# Patient Record
Sex: FEMALE | Race: WHITE | NOT HISPANIC OR LATINO | Employment: UNEMPLOYED | URBAN - METROPOLITAN AREA
[De-identification: names, ages, dates, MRNs, and addresses within clinical notes are randomized per-mention and may not be internally consistent; named-entity substitution may affect disease eponyms.]

---

## 2017-02-03 ENCOUNTER — OFFICE VISIT (OUTPATIENT)
Dept: INTERNAL MEDICINE | Facility: CLINIC | Age: 52
End: 2017-02-03

## 2017-02-03 VITALS
BODY MASS INDEX: 42.43 KG/M2 | HEART RATE: 62 BPM | RESPIRATION RATE: 16 BRPM | DIASTOLIC BLOOD PRESSURE: 86 MMHG | SYSTOLIC BLOOD PRESSURE: 140 MMHG | WEIGHT: 232 LBS

## 2017-02-03 DIAGNOSIS — E78.5 HYPERLIPIDEMIA, UNSPECIFIED HYPERLIPIDEMIA TYPE: ICD-10-CM

## 2017-02-03 DIAGNOSIS — K59.09 OTHER CONSTIPATION: ICD-10-CM

## 2017-02-03 DIAGNOSIS — F41.9 ANXIETY: ICD-10-CM

## 2017-02-03 DIAGNOSIS — J44.9 CHRONIC OBSTRUCTIVE PULMONARY DISEASE, UNSPECIFIED COPD TYPE (HCC): ICD-10-CM

## 2017-02-03 DIAGNOSIS — F32.A DEPRESSION, UNSPECIFIED DEPRESSION TYPE: ICD-10-CM

## 2017-02-03 DIAGNOSIS — I10 ESSENTIAL HYPERTENSION: Primary | ICD-10-CM

## 2017-02-03 DIAGNOSIS — M54.50 CHRONIC LOW BACK PAIN WITHOUT SCIATICA, UNSPECIFIED BACK PAIN LATERALITY: ICD-10-CM

## 2017-02-03 DIAGNOSIS — G47.09 OTHER INSOMNIA: ICD-10-CM

## 2017-02-03 DIAGNOSIS — K21.9 GASTROESOPHAGEAL REFLUX DISEASE WITHOUT ESOPHAGITIS: ICD-10-CM

## 2017-02-03 DIAGNOSIS — G89.29 CHRONIC LOW BACK PAIN WITHOUT SCIATICA, UNSPECIFIED BACK PAIN LATERALITY: ICD-10-CM

## 2017-02-03 DIAGNOSIS — Z11.59 NEED FOR HEPATITIS C SCREENING TEST: ICD-10-CM

## 2017-02-03 DIAGNOSIS — J30.89 SEASONAL ALLERGIC RHINITIS DUE TO OTHER ALLERGIC TRIGGER: ICD-10-CM

## 2017-02-03 PROBLEM — K59.00 CONSTIPATION: Status: ACTIVE | Noted: 2017-02-03

## 2017-02-03 PROBLEM — J30.2 SEASONAL ALLERGIES: Status: ACTIVE | Noted: 2017-02-03

## 2017-02-03 LAB
ALBUMIN SERPL-MCNC: 4.6 G/DL (ref 3.2–4.8)
ALBUMIN/GLOB SERPL: 1.5 G/DL (ref 1.5–2.5)
ALP SERPL-CCNC: 107 U/L (ref 25–100)
ALT SERPL W P-5'-P-CCNC: 43 U/L (ref 7–40)
ANION GAP SERPL CALCULATED.3IONS-SCNC: 7 MMOL/L (ref 3–11)
AST SERPL-CCNC: 27 U/L (ref 0–33)
BILIRUB SERPL-MCNC: 0.3 MG/DL (ref 0.3–1.2)
BUN BLD-MCNC: 12 MG/DL (ref 9–23)
BUN/CREAT SERPL: 17.1 (ref 7–25)
CALCIUM SPEC-SCNC: 10.6 MG/DL (ref 8.7–10.4)
CHLORIDE SERPL-SCNC: 102 MMOL/L (ref 99–109)
CO2 SERPL-SCNC: 30 MMOL/L (ref 20–31)
CREAT BLD-MCNC: 0.7 MG/DL (ref 0.6–1.3)
DEPRECATED RDW RBC AUTO: 42.6 FL (ref 37–54)
ERYTHROCYTE [DISTWIDTH] IN BLOOD BY AUTOMATED COUNT: 13.4 % (ref 11.3–14.5)
GFR SERPL CREATININE-BSD FRML MDRD: 88 ML/MIN/1.73
GLOBULIN UR ELPH-MCNC: 3 GM/DL
GLUCOSE BLD-MCNC: 96 MG/DL (ref 70–100)
HAV IGM SERPL QL IA: NORMAL
HBV CORE IGM SERPL QL IA: NORMAL
HBV SURFACE AG SERPL QL IA: NORMAL
HCT VFR BLD AUTO: 43.2 % (ref 34.5–44)
HCV AB SER DONR QL: NORMAL
HGB BLD-MCNC: 14 G/DL (ref 11.5–15.5)
MCH RBC QN AUTO: 28.2 PG (ref 27–31)
MCHC RBC AUTO-ENTMCNC: 32.4 G/DL (ref 32–36)
MCV RBC AUTO: 86.9 FL (ref 80–99)
PLATELET # BLD AUTO: 176 10*3/MM3 (ref 150–450)
PMV BLD AUTO: 13.8 FL (ref 6–12)
POTASSIUM BLD-SCNC: 4.6 MMOL/L (ref 3.5–5.5)
PROT SERPL-MCNC: 7.6 G/DL (ref 5.7–8.2)
RBC # BLD AUTO: 4.97 10*6/MM3 (ref 3.89–5.14)
SODIUM BLD-SCNC: 139 MMOL/L (ref 132–146)
WBC NRBC COR # BLD: 7.35 10*3/MM3 (ref 3.5–10.8)

## 2017-02-03 PROCEDURE — 80074 ACUTE HEPATITIS PANEL: CPT | Performed by: INTERNAL MEDICINE

## 2017-02-03 PROCEDURE — 99214 OFFICE O/P EST MOD 30 MIN: CPT | Performed by: INTERNAL MEDICINE

## 2017-02-03 PROCEDURE — 80053 COMPREHEN METABOLIC PANEL: CPT | Performed by: INTERNAL MEDICINE

## 2017-02-03 PROCEDURE — 36415 COLL VENOUS BLD VENIPUNCTURE: CPT | Performed by: INTERNAL MEDICINE

## 2017-02-03 PROCEDURE — 85027 COMPLETE CBC AUTOMATED: CPT | Performed by: INTERNAL MEDICINE

## 2017-02-03 RX ORDER — CETIRIZINE HYDROCHLORIDE 10 MG/1
10 TABLET ORAL DAILY
Qty: 30 TABLET | Refills: 5 | Status: SHIPPED | OUTPATIENT
Start: 2017-02-03 | End: 2017-08-15 | Stop reason: SDUPTHER

## 2017-02-03 RX ORDER — DICYCLOMINE HCL 20 MG
20 TABLET ORAL 2 TIMES DAILY PRN
Qty: 60 TABLET | Refills: 2 | Status: SHIPPED | OUTPATIENT
Start: 2017-02-03 | End: 2017-07-08 | Stop reason: SDUPTHER

## 2017-02-03 RX ORDER — BUDESONIDE AND FORMOTEROL FUMARATE DIHYDRATE 160; 4.5 UG/1; UG/1
2 AEROSOL RESPIRATORY (INHALATION)
Qty: 10.2 INHALER | Refills: 5 | Status: SHIPPED | OUTPATIENT
Start: 2017-02-03 | End: 2018-02-26

## 2017-02-03 RX ORDER — FLUTICASONE PROPIONATE 50 MCG
1 SPRAY, SUSPENSION (ML) NASAL DAILY
Qty: 1 EACH | Refills: 5 | Status: SHIPPED | OUTPATIENT
Start: 2017-02-03 | End: 2018-02-26 | Stop reason: SDUPTHER

## 2017-02-03 RX ORDER — GABAPENTIN 400 MG/1
400 CAPSULE ORAL 3 TIMES DAILY
Qty: 90 CAPSULE | Refills: 2 | Status: SHIPPED | OUTPATIENT
Start: 2017-02-03 | End: 2017-07-17 | Stop reason: SDUPTHER

## 2017-02-03 RX ORDER — LISINOPRIL 10 MG/1
10 TABLET ORAL DAILY
Qty: 30 TABLET | Refills: 5 | Status: CANCELLED | OUTPATIENT
Start: 2017-02-03

## 2017-02-03 RX ORDER — SIMVASTATIN 20 MG
20 TABLET ORAL NIGHTLY
Qty: 30 TABLET | Refills: 5 | Status: SHIPPED | OUTPATIENT
Start: 2017-02-03 | End: 2018-02-26 | Stop reason: SDUPTHER

## 2017-02-03 RX ORDER — LISINOPRIL 20 MG/1
20 TABLET ORAL DAILY
Qty: 30 TABLET | Refills: 4 | Status: SHIPPED | OUTPATIENT
Start: 2017-02-03 | End: 2017-07-08 | Stop reason: SDUPTHER

## 2017-02-03 RX ORDER — MELOXICAM 7.5 MG/1
7.5 TABLET ORAL DAILY PRN
Qty: 30 TABLET | Refills: 5 | Status: SHIPPED | OUTPATIENT
Start: 2017-02-03 | End: 2017-08-12 | Stop reason: SDUPTHER

## 2017-02-03 RX ORDER — LANSOPRAZOLE 15 MG/1
15 CAPSULE, DELAYED RELEASE ORAL 2 TIMES DAILY
Qty: 60 CAPSULE | Refills: 5 | Status: SHIPPED | OUTPATIENT
Start: 2017-02-03 | End: 2018-02-26 | Stop reason: SDUPTHER

## 2017-02-03 NOTE — PROGRESS NOTES
Gaby Lowe is a 51 y.o. female.   Pt is here to follow up on GERD,HPL,HTN,COPD,anxiety/depression,constipation and insomnia.                 History of Present Illness   Pt is here to follow up on GERD,HPL,HTN,COPD,anxiety/depression,constipation and insomnia. She states that all chronic issues are doing well but she is interested in a healthier diet and weight loss.           The following portions of the patient's history were reviewed and updated as appropriate: allergies, current medications, past family history, past medical history, past social history, past surgical history and problem list.             Review of Systems   Constitutional: Negative.    HENT: Negative.    Eyes: Negative.    Respiratory:        See HPI   Cardiovascular:        See HPI.    Gastrointestinal:        See HPI..    Endocrine: Negative.    Genitourinary: Negative.    Musculoskeletal: Negative.    Skin: Negative.    Allergic/Immunologic: Negative.    Neurological: Negative.    Hematological: Negative.    Psychiatric/Behavioral:        See HPI.                 Objective   Physical Exam   Constitutional: She is oriented to person, place, and time. She appears well-developed and well-nourished.   HENT:   Head: Normocephalic and atraumatic.   Right Ear: External ear normal.   Left Ear: External ear normal.   Nose: Nose normal.   Mouth/Throat: Oropharynx is clear and moist.   Eyes: Conjunctivae and EOM are normal. Pupils are equal, round, and reactive to light.   Neck: Normal range of motion. Neck supple. No tracheal deviation present. No thyromegaly present.   Cardiovascular: Normal rate, regular rhythm, normal heart sounds and intact distal pulses.    Pulmonary/Chest: Effort normal and breath sounds normal.   Abdominal: Soft. Bowel sounds are normal. She exhibits no distension. There is no tenderness.   Neurological: She is alert and oriented to person, place, and time. She has normal reflexes.   Skin: Skin is warm  and dry.   Psychiatric: She has a normal mood and affect.   Nursing note and vitals reviewed.                 Assessment/Plan     Essential hypertension  -     Comprehensive Metabolic Panel  -     CBC (No Diff)  -     lisinopril (PRINIVIL,ZESTRIL) 20 MG tablet; Take 1 tablet by mouth Daily.    Gastroesophageal reflux disease without esophagitis  -     lansoprazole (PREVACID) 15 MG capsule; Take 1 capsule by mouth 2 (Two) Times a Day.    Hyperlipidemia, unspecified hyperlipidemia type  -     simvastatin (ZOCOR) 20 MG tablet; Take 1 tablet by mouth Every Night.    Other constipation  -     dicyclomine (BENTYL) 20 MG tablet; Take 1 tablet by mouth 2 (Two) Times a Day As Needed (constipation).    Chronic obstructive pulmonary disease, unspecified COPD type  -     tiotropium (SPIRIVA HANDIHALER) 18 MCG per inhalation capsule; Place 1 capsule into inhaler and inhale Daily.  -     budesonide-formoterol (SYMBICORT) 160-4.5 MCG/ACT inhaler; Inhale 2 puffs 2 (Two) Times a Day.    Anxiety    Depression, unspecified depression type    Other insomnia    Seasonal allergic rhinitis due to other allergic trigger  -     cetirizine (zyrTEC) 10 MG tablet; Take 1 tablet by mouth Daily.  -     fluticasone (FLONASE) 50 MCG/ACT nasal spray; 1 spray into each nostril Daily.    Chronic low back pain without sciatica, unspecified back pain laterality  -     gabapentin (NEURONTIN) 400 MG capsule; Take 1 capsule by mouth 3 (Three) Times a Day.  -     meloxicam (MOBIC) 7.5 MG tablet; Take 1 tablet by mouth Daily As Needed for moderate pain (4-6).    Need for hepatitis C screening test  -     Hepatitis Panel, Acute    Other orders  -     Cancel: lisinopril (PRINIVIL,ZESTRIL) 10 MG tablet; Take 1 tablet by mouth Daily.        1.) Increase lisinopril to 20 mg PO Daily. How to dose and possible s/e discussed.   2.) Discussed Mediterranean diet for 10 minutes and gave example   3.) Follow up in 5 months   6.) Check CBC and CMP, Hepatitis screen    7.) Tetanus- Pt will get at pharmacy.   8.) Pt will  schedule physical to do pap and mammogram.     * Total time spent in discussion and exam 35 minutes.

## 2017-02-07 PROBLEM — Z11.59 NEED FOR HEPATITIS C SCREENING TEST: Status: ACTIVE | Noted: 2017-02-07

## 2017-03-19 DIAGNOSIS — F41.9 ANXIETY: ICD-10-CM

## 2017-03-19 DIAGNOSIS — F32.A DEPRESSION: ICD-10-CM

## 2017-05-22 RX ORDER — MOMETASONE FUROATE AND FORMOTEROL FUMARATE DIHYDRATE 200; 5 UG/1; UG/1
AEROSOL RESPIRATORY (INHALATION)
Qty: 13 G | Refills: 0 | Status: SHIPPED | OUTPATIENT
Start: 2017-05-22 | End: 2017-07-08 | Stop reason: SDUPTHER

## 2017-07-06 ENCOUNTER — OFFICE VISIT (OUTPATIENT)
Dept: INTERNAL MEDICINE | Facility: CLINIC | Age: 52
End: 2017-07-06

## 2017-07-06 VITALS
WEIGHT: 236 LBS | RESPIRATION RATE: 16 BRPM | DIASTOLIC BLOOD PRESSURE: 80 MMHG | SYSTOLIC BLOOD PRESSURE: 132 MMHG | BODY MASS INDEX: 43.43 KG/M2 | TEMPERATURE: 97 F | HEIGHT: 62 IN

## 2017-07-06 DIAGNOSIS — Z13.820 OSTEOPOROSIS SCREENING: ICD-10-CM

## 2017-07-06 DIAGNOSIS — Z12.39 BREAST CANCER SCREENING: Primary | ICD-10-CM

## 2017-07-06 DIAGNOSIS — Z12.4 CERVICAL CANCER SCREENING: ICD-10-CM

## 2017-07-06 DIAGNOSIS — Z13.6 SCREENING FOR CARDIOVASCULAR CONDITION: ICD-10-CM

## 2017-07-06 LAB
ALBUMIN SERPL-MCNC: 4.6 G/DL (ref 3.2–4.8)
ALBUMIN/GLOB SERPL: 1.6 G/DL (ref 1.5–2.5)
ALP SERPL-CCNC: 99 U/L (ref 25–100)
ALT SERPL W P-5'-P-CCNC: 35 U/L (ref 7–40)
ANION GAP SERPL CALCULATED.3IONS-SCNC: 12 MMOL/L (ref 3–11)
ARTICHOKE IGE QN: 120 MG/DL (ref 0–130)
AST SERPL-CCNC: 28 U/L (ref 0–33)
BILIRUB SERPL-MCNC: 0.4 MG/DL (ref 0.3–1.2)
BUN BLD-MCNC: 16 MG/DL (ref 9–23)
BUN/CREAT SERPL: 26.7 (ref 7–25)
CALCIUM SPEC-SCNC: 9.9 MG/DL (ref 8.7–10.4)
CHLORIDE SERPL-SCNC: 98 MMOL/L (ref 99–109)
CHOLEST SERPL-MCNC: 191 MG/DL (ref 0–200)
CO2 SERPL-SCNC: 26 MMOL/L (ref 20–31)
CREAT BLD-MCNC: 0.6 MG/DL (ref 0.6–1.3)
DEPRECATED RDW RBC AUTO: 44.3 FL (ref 37–54)
ERYTHROCYTE [DISTWIDTH] IN BLOOD BY AUTOMATED COUNT: 13.9 % (ref 11.3–14.5)
GFR SERPL CREATININE-BSD FRML MDRD: 105 ML/MIN/1.73
GLOBULIN UR ELPH-MCNC: 2.8 GM/DL
GLUCOSE BLD-MCNC: 113 MG/DL (ref 70–100)
HCT VFR BLD AUTO: 43.9 % (ref 34.5–44)
HDLC SERPL-MCNC: 51 MG/DL (ref 40–60)
HGB BLD-MCNC: 13.7 G/DL (ref 11.5–15.5)
MCH RBC QN AUTO: 27.2 PG (ref 27–31)
MCHC RBC AUTO-ENTMCNC: 31.2 G/DL (ref 32–36)
MCV RBC AUTO: 87.3 FL (ref 80–99)
PLATELET # BLD AUTO: 182 10*3/MM3 (ref 150–450)
PMV BLD AUTO: 13.3 FL (ref 6–12)
POTASSIUM BLD-SCNC: 4.4 MMOL/L (ref 3.5–5.5)
PROT SERPL-MCNC: 7.4 G/DL (ref 5.7–8.2)
RBC # BLD AUTO: 5.03 10*6/MM3 (ref 3.89–5.14)
SODIUM BLD-SCNC: 136 MMOL/L (ref 132–146)
TRIGL SERPL-MCNC: 160 MG/DL (ref 0–150)
WBC NRBC COR # BLD: 9.68 10*3/MM3 (ref 3.5–10.8)

## 2017-07-06 PROCEDURE — 80053 COMPREHEN METABOLIC PANEL: CPT | Performed by: INTERNAL MEDICINE

## 2017-07-06 PROCEDURE — 85027 COMPLETE CBC AUTOMATED: CPT | Performed by: INTERNAL MEDICINE

## 2017-07-06 PROCEDURE — 80061 LIPID PANEL: CPT | Performed by: INTERNAL MEDICINE

## 2017-07-06 PROCEDURE — 99396 PREV VISIT EST AGE 40-64: CPT | Performed by: INTERNAL MEDICINE

## 2017-07-06 NOTE — PROGRESS NOTES
"Gaby Lowe is a 51 y.o. female.   Pt is here for physical today.             History of Present Illness   Pt is here for physical today.     Colonoscopy- Not due til 1/26/2026  Pap- Pt states that last pap smear was around 5 yrs ago, Pt went through menopause in 9/2013   Breast exam- Due today   Mammogram- due now   DEXA- Due now   Shingles vaccine- Pt has not had, is going to check with paharmacy/insurance first   PNA vaccine- Not due at this time   Tetanus booster- done 2/3/2017  Flu vaccine- Due in the Fall    Pt complains for the last several months of numbness in right hand, she states it involves the thumb, index and middle finger, she states that when she \"shakes her hand out\" it will go away.        The following portions of the patient's history were reviewed and updated as appropriate: allergies, current medications, past family history, past medical history, past social history, past surgical history and problem list.               Review of Systems   Constitutional: Negative.    HENT: Negative.    Eyes: Negative.    Respiratory: Negative.    Cardiovascular: Negative.    Gastrointestinal: Negative.    Endocrine: Negative.    Genitourinary: Negative.    Musculoskeletal: Negative.    Skin: Negative.    Allergic/Immunologic: Negative.    Neurological:        See HPI.    Hematological: Negative.    Psychiatric/Behavioral: Negative.                   Objective   Physical Exam   Constitutional: She is oriented to person, place, and time. She appears well-developed and well-nourished.   HENT:   Head: Normocephalic and atraumatic.   Right Ear: External ear normal.   Left Ear: External ear normal.   Nose: Nose normal.   Mouth/Throat: Oropharynx is clear and moist.   Eyes: Conjunctivae and EOM are normal. Pupils are equal, round, and reactive to light.   Neck: Normal range of motion. Neck supple. No tracheal deviation present. No thyromegaly present.   Cardiovascular: Normal rate, regular " rhythm, normal heart sounds and intact distal pulses.    Pulmonary/Chest: Effort normal and breath sounds normal. Chest wall is not dull to percussion. She exhibits no mass, no tenderness, no bony tenderness, no laceration, no crepitus, no edema, no deformity, no swelling and no retraction. Right breast exhibits no inverted nipple, no mass, no nipple discharge, no skin change and no tenderness. Left breast exhibits no inverted nipple, no mass, no nipple discharge, no skin change and no tenderness. Breasts are symmetrical. There is no breast swelling.   Abdominal: Soft. Bowel sounds are normal. She exhibits no distension. There is no tenderness. Hernia confirmed negative in the right inguinal area and confirmed negative in the left inguinal area.   Genitourinary: No breast tenderness, discharge or bleeding. Pelvic exam was performed with patient supine. No labial fusion. There is no rash, tenderness, lesion or injury on the right labia. There is no rash, tenderness, lesion or injury on the left labia. Uterus is not deviated, not enlarged, not fixed and not tender. Cervix exhibits no motion tenderness, no discharge and no friability. Right adnexum displays no mass, no tenderness and no fullness. Left adnexum displays no mass, no tenderness and no fullness. No erythema, tenderness or bleeding in the vagina. No foreign body in the vagina. No signs of injury around the vagina. No vaginal discharge found.   Lymphadenopathy:        Right: No inguinal adenopathy present.        Left: No inguinal adenopathy present.   Neurological: She is alert and oriented to person, place, and time. She has normal reflexes.   Tinel's sign positive on right    Skin: Skin is warm and dry.   Psychiatric: She has a normal mood and affect.   Nursing note and vitals reviewed.            Assessment/Plan    Breast cancer screening  -     Mammo Screening Digital Tomosynthesis Bilateral With CAD; Future    Cervical cancer screening  -      Liquid-based Pap Smear, Screening    Osteoporosis screening  -      DEXA SCAN    Screening for cardiovascular condition  -     Comprehensive Metabolic Panel  -     Lipid Panel  -     CBC (No Diff)        1.) Mammogram ordered   2.) DEXA scan   3.) Shingles vaccine-Pt is going to check with insurance/pharamcy first.   4.) Check CBC,CMP and lipid panel   5.) Pap smear done today   6.) Carpal Tunnel brace to right hand for 4 weeks, wear at night, Pt seun return in 1 month for evaluation.

## 2017-07-08 DIAGNOSIS — I10 ESSENTIAL HYPERTENSION: ICD-10-CM

## 2017-07-08 DIAGNOSIS — K59.09 OTHER CONSTIPATION: ICD-10-CM

## 2017-07-10 RX ORDER — DICYCLOMINE HCL 20 MG
TABLET ORAL
Qty: 60 TABLET | Refills: 2 | Status: SHIPPED | OUTPATIENT
Start: 2017-07-10 | End: 2017-10-17 | Stop reason: SDUPTHER

## 2017-07-10 RX ORDER — LISINOPRIL 20 MG/1
TABLET ORAL
Qty: 30 TABLET | Refills: 4 | Status: SHIPPED | OUTPATIENT
Start: 2017-07-10 | End: 2018-02-26 | Stop reason: SDUPTHER

## 2017-07-10 RX ORDER — MOMETASONE FUROATE AND FORMOTEROL FUMARATE DIHYDRATE 200; 5 UG/1; UG/1
AEROSOL RESPIRATORY (INHALATION)
Qty: 13 G | Refills: 0 | Status: SHIPPED | OUTPATIENT
Start: 2017-07-10 | End: 2017-08-12 | Stop reason: SDUPTHER

## 2017-07-12 DIAGNOSIS — I10 ESSENTIAL HYPERTENSION: ICD-10-CM

## 2017-07-13 ENCOUNTER — TELEPHONE (OUTPATIENT)
Dept: INTERNAL MEDICINE | Facility: CLINIC | Age: 52
End: 2017-07-13

## 2017-07-13 RX ORDER — LISINOPRIL 20 MG/1
TABLET ORAL
Qty: 30 TABLET | Refills: 4 | OUTPATIENT
Start: 2017-07-13

## 2017-07-13 NOTE — TELEPHONE ENCOUNTER
----- Message from [place] sent at 7/13/2017  8:35 AM EDT -----  Patients insurance denied mammogram auth.  They said pt does not meet the criteria for digital tomosynthesis. How would you like me to proceed? We could do appeal, or wait a month and try to submit again, but it doesn't specify what criteria she does not meet.

## 2017-07-17 DIAGNOSIS — G89.29 CHRONIC LOW BACK PAIN WITHOUT SCIATICA, UNSPECIFIED BACK PAIN LATERALITY: ICD-10-CM

## 2017-07-17 DIAGNOSIS — M54.50 CHRONIC LOW BACK PAIN WITHOUT SCIATICA, UNSPECIFIED BACK PAIN LATERALITY: ICD-10-CM

## 2017-07-17 RX ORDER — GABAPENTIN 400 MG/1
CAPSULE ORAL
Qty: 90 CAPSULE | Refills: 1 | Status: SHIPPED | OUTPATIENT
Start: 2017-07-17 | End: 2017-08-16 | Stop reason: SDUPTHER

## 2017-07-20 NOTE — TELEPHONE ENCOUNTER
I know, her insurance is one of the only plans that require us to submit info and get an authorization for a mammogram. They said she did not qualify, but did not give additional info and I do not understand why they would deny this. I will have to call them to see what qualifications they are looking for before she can get this. I might have to do appeal or wait one month and resubmit case. Send this back to me and I will check into this and let you know what I find out.

## 2017-07-21 NOTE — TELEPHONE ENCOUNTER
I called [facility], they said this was denied because we wanted it with tomosynthesis. They said they would authorize it without tomosynthesis, however [employer] does not do these without tomosynthesis. I will have to check around and see if there is anyone in the area that will do it without tomosynthesis. If not, I will try an appeal letting them know it would not be possible to do it without tomosynthesis.

## 2017-07-25 NOTE — TELEPHONE ENCOUNTER
She can schedule at [facility] to do this without carol. I need to figure out where she had this done, first.

## 2017-08-12 DIAGNOSIS — M54.50 CHRONIC LOW BACK PAIN WITHOUT SCIATICA, UNSPECIFIED BACK PAIN LATERALITY: ICD-10-CM

## 2017-08-12 DIAGNOSIS — G89.29 CHRONIC LOW BACK PAIN WITHOUT SCIATICA, UNSPECIFIED BACK PAIN LATERALITY: ICD-10-CM

## 2017-08-14 RX ORDER — MELOXICAM 7.5 MG/1
TABLET ORAL
Qty: 30 TABLET | Refills: 5 | Status: SHIPPED | OUTPATIENT
Start: 2017-08-14 | End: 2018-10-15 | Stop reason: SDUPTHER

## 2017-08-14 RX ORDER — MOMETASONE FUROATE AND FORMOTEROL FUMARATE DIHYDRATE 200; 5 UG/1; UG/1
AEROSOL RESPIRATORY (INHALATION)
Qty: 13 G | Refills: 0 | Status: SHIPPED | OUTPATIENT
Start: 2017-08-14 | End: 2017-08-18 | Stop reason: SDUPTHER

## 2017-08-15 ENCOUNTER — OFFICE VISIT (OUTPATIENT)
Dept: INTERNAL MEDICINE | Facility: CLINIC | Age: 52
End: 2017-08-15

## 2017-08-15 VITALS
SYSTOLIC BLOOD PRESSURE: 120 MMHG | WEIGHT: 240.2 LBS | DIASTOLIC BLOOD PRESSURE: 74 MMHG | RESPIRATION RATE: 16 BRPM | BODY MASS INDEX: 44.2 KG/M2 | HEIGHT: 62 IN

## 2017-08-15 DIAGNOSIS — G56.01 CARPAL TUNNEL SYNDROME ON RIGHT: Primary | ICD-10-CM

## 2017-08-15 DIAGNOSIS — G47.00 INSOMNIA, UNSPECIFIED TYPE: ICD-10-CM

## 2017-08-15 DIAGNOSIS — J30.89 SEASONAL ALLERGIC RHINITIS DUE TO OTHER ALLERGIC TRIGGER: ICD-10-CM

## 2017-08-15 PROCEDURE — 99213 OFFICE O/P EST LOW 20 MIN: CPT | Performed by: PHYSICIAN ASSISTANT

## 2017-08-15 RX ORDER — CETIRIZINE HYDROCHLORIDE 10 MG/1
10 TABLET ORAL DAILY
Qty: 30 TABLET | Refills: 5 | Status: SHIPPED | OUTPATIENT
Start: 2017-08-15 | End: 2018-03-15 | Stop reason: SDUPTHER

## 2017-08-15 RX ORDER — PNEUMOCOCCAL 13-VALENT CONJUGATE VACCINE 2.2; 2.2; 2.2; 2.2; 2.2; 4.4; 2.2; 2.2; 2.2; 2.2; 2.2; 2.2; 2.2 UG/.5ML; UG/.5ML; UG/.5ML; UG/.5ML; UG/.5ML; UG/.5ML; UG/.5ML; UG/.5ML; UG/.5ML; UG/.5ML; UG/.5ML; UG/.5ML; UG/.5ML
INJECTION, SUSPENSION INTRAMUSCULAR
Refills: 0 | COMMUNITY
Start: 2017-07-17 | End: 2018-02-26

## 2017-08-15 RX ORDER — HYDROXYZINE HYDROCHLORIDE 25 MG/1
25 TABLET, FILM COATED ORAL NIGHTLY PRN
Qty: 30 TABLET | Refills: 2 | Status: SHIPPED | OUTPATIENT
Start: 2017-08-15 | End: 2018-02-26

## 2017-08-16 DIAGNOSIS — G89.29 CHRONIC LOW BACK PAIN WITHOUT SCIATICA, UNSPECIFIED BACK PAIN LATERALITY: ICD-10-CM

## 2017-08-16 DIAGNOSIS — J44.9 CHRONIC OBSTRUCTIVE PULMONARY DISEASE, UNSPECIFIED COPD TYPE (HCC): ICD-10-CM

## 2017-08-16 DIAGNOSIS — M54.50 CHRONIC LOW BACK PAIN WITHOUT SCIATICA, UNSPECIFIED BACK PAIN LATERALITY: ICD-10-CM

## 2017-08-16 RX ORDER — TIOTROPIUM BROMIDE 18 UG/1
CAPSULE ORAL; RESPIRATORY (INHALATION)
Qty: 30 CAPSULE | Refills: 5 | Status: SHIPPED | OUTPATIENT
Start: 2017-08-16 | End: 2018-02-26

## 2017-08-16 RX ORDER — GABAPENTIN 400 MG/1
CAPSULE ORAL
Qty: 90 CAPSULE | Refills: 0 | Status: SHIPPED | OUTPATIENT
Start: 2017-08-16 | End: 2017-08-18 | Stop reason: SDUPTHER

## 2017-08-18 DIAGNOSIS — G89.29 CHRONIC LOW BACK PAIN WITHOUT SCIATICA, UNSPECIFIED BACK PAIN LATERALITY: ICD-10-CM

## 2017-08-18 DIAGNOSIS — M54.50 CHRONIC LOW BACK PAIN WITHOUT SCIATICA, UNSPECIFIED BACK PAIN LATERALITY: ICD-10-CM

## 2017-08-22 RX ORDER — GABAPENTIN 400 MG/1
CAPSULE ORAL
Qty: 90 CAPSULE | Refills: 1 | OUTPATIENT
Start: 2017-08-22 | End: 2018-02-26

## 2017-08-22 RX ORDER — MOMETASONE FUROATE AND FORMOTEROL FUMARATE DIHYDRATE 200; 5 UG/1; UG/1
AEROSOL RESPIRATORY (INHALATION)
Qty: 13 G | Refills: 5 | Status: SHIPPED | OUTPATIENT
Start: 2017-08-22 | End: 2018-02-05 | Stop reason: SDUPTHER

## 2017-09-15 DIAGNOSIS — F41.9 ANXIETY: ICD-10-CM

## 2017-09-15 DIAGNOSIS — F32.A DEPRESSION: ICD-10-CM

## 2017-10-17 DIAGNOSIS — K59.09 OTHER CONSTIPATION: ICD-10-CM

## 2017-10-17 RX ORDER — DICYCLOMINE HCL 20 MG
TABLET ORAL
Qty: 60 TABLET | Refills: 2 | Status: SHIPPED | OUTPATIENT
Start: 2017-10-17 | End: 2018-03-15 | Stop reason: SDUPTHER

## 2017-11-14 DIAGNOSIS — M54.50 CHRONIC LOW BACK PAIN WITHOUT SCIATICA, UNSPECIFIED BACK PAIN LATERALITY: ICD-10-CM

## 2017-11-14 DIAGNOSIS — G89.29 CHRONIC LOW BACK PAIN WITHOUT SCIATICA, UNSPECIFIED BACK PAIN LATERALITY: ICD-10-CM

## 2017-11-15 RX ORDER — GABAPENTIN 400 MG/1
CAPSULE ORAL
Qty: 90 CAPSULE | Refills: 1 | OUTPATIENT
Start: 2017-11-15

## 2017-11-22 ENCOUNTER — OFFICE VISIT (OUTPATIENT)
Dept: RETAIL CLINIC | Facility: CLINIC | Age: 52
End: 2017-11-22

## 2017-11-22 DIAGNOSIS — J06.9 ACUTE URI: Primary | ICD-10-CM

## 2017-11-22 PROCEDURE — 99213 OFFICE O/P EST LOW 20 MIN: CPT | Performed by: NURSE PRACTITIONER

## 2017-11-30 NOTE — PROGRESS NOTES
Patient seen by ROMI James on this date.  See scanned documents in  due to extended internet outage

## 2017-12-07 ENCOUNTER — OFFICE VISIT (OUTPATIENT)
Dept: RETAIL CLINIC | Facility: CLINIC | Age: 52
End: 2017-12-07

## 2017-12-07 VITALS
DIASTOLIC BLOOD PRESSURE: 88 MMHG | OXYGEN SATURATION: 98 % | TEMPERATURE: 98.5 F | SYSTOLIC BLOOD PRESSURE: 132 MMHG | BODY MASS INDEX: 55.54 KG/M2 | RESPIRATION RATE: 16 BRPM | HEART RATE: 92 BPM | WEIGHT: 240 LBS | HEIGHT: 55 IN

## 2017-12-07 DIAGNOSIS — J20.9 ACUTE BRONCHITIS, UNSPECIFIED ORGANISM: Primary | ICD-10-CM

## 2017-12-07 PROCEDURE — 99213 OFFICE O/P EST LOW 20 MIN: CPT | Performed by: NURSE PRACTITIONER

## 2017-12-07 RX ORDER — AZITHROMYCIN 250 MG/1
TABLET, FILM COATED ORAL
Qty: 6 TABLET | Refills: 0 | Status: SHIPPED | OUTPATIENT
Start: 2017-12-07 | End: 2018-02-26

## 2017-12-07 RX ORDER — PREDNISONE 10 MG/1
TABLET ORAL DAILY
Qty: 21 EACH | Refills: 0 | Status: SHIPPED | OUTPATIENT
Start: 2017-12-07 | End: 2017-12-13

## 2017-12-07 RX ORDER — DEXTROMETHORPHAN HYDROBROMIDE AND PROMETHAZINE HYDROCHLORIDE 15; 6.25 MG/5ML; MG/5ML
5 SYRUP ORAL 4 TIMES DAILY PRN
Qty: 118 ML | Refills: 0 | Status: SHIPPED | OUTPATIENT
Start: 2017-12-07 | End: 2018-02-26

## 2017-12-07 RX ORDER — GUAIFENESIN AND DEXTROMETHORPHAN HYDROBROMIDE 600; 30 MG/1; MG/1
1 TABLET, EXTENDED RELEASE ORAL 2 TIMES DAILY PRN
Qty: 20 TABLET | Refills: 0 | Status: SHIPPED | OUTPATIENT
Start: 2017-12-07 | End: 2018-02-26

## 2017-12-07 NOTE — PROGRESS NOTES
Subjective   Adrianne Lowe is a 52 y.o. female.     History of Present Illness   Patient presents with persistent productive cough with yellow/green phlegm. She is unsure if she has had a fever but she has felt warm. She was just here the end of November and has been coughing since that time with no improvement.  The following portions of the patient's history were reviewed and updated as appropriate: allergies, current medications, past medical history, past social history, past surgical history and problem list.    Review of Systems   Constitutional: Positive for fatigue and fever. Negative for activity change and appetite change. Unexpected weight change: tactile.   HENT: Positive for congestion. Negative for ear discharge, ear pain, rhinorrhea, sinus pain, sinus pressure and sore throat.    Eyes: Negative.    Respiratory: Positive for cough, shortness of breath and wheezing.    Cardiovascular: Negative.        Objective   Physical Exam   Constitutional: She appears well-developed and well-nourished.   HENT:   Head: Normocephalic and atraumatic.   Right Ear: External ear normal.   Left Ear: External ear normal.   Cardiovascular: Normal rate, regular rhythm and normal heart sounds.    Pulmonary/Chest: Effort normal. No respiratory distress. She has wheezes.   Scattered bilateral rhonchi   Lymphadenopathy:     She has no cervical adenopathy.   Neurological: She is alert.   Skin: Skin is warm and dry.   Psychiatric: She has a normal mood and affect. Her behavior is normal.   Nursing note and vitals reviewed.      Assessment/Plan   Adrianne was seen today for uri.    Diagnoses and all orders for this visit:    Acute bronchitis, unspecified organism  -     azithromycin (ZITHROMAX Z-DRAKE) 250 MG tablet; Take 2 tablets the first day, then 1 tablet daily for 4 days.  -     PredniSONE (DELTASONE) 10 MG (21) tablet pack; Take  by mouth Daily for 6 days.  -     guaifenesin-dextromethorphan (MUCINEX DM)  MG tablet  sustained-release 12 hour tablet; Take 1 tablet by mouth 2 (Two) Times a Day As Needed (cough).  -     promethazine-dextromethorphan (PROMETHAZINE-DM) 6.25-15 MG/5ML syrup; Take 5 mL by mouth 4 (Four) Times a Day As Needed for Cough.    Shari Larkin, APRN

## 2017-12-14 DIAGNOSIS — M54.50 CHRONIC LOW BACK PAIN WITHOUT SCIATICA, UNSPECIFIED BACK PAIN LATERALITY: ICD-10-CM

## 2017-12-14 DIAGNOSIS — G89.29 CHRONIC LOW BACK PAIN WITHOUT SCIATICA, UNSPECIFIED BACK PAIN LATERALITY: ICD-10-CM

## 2017-12-14 RX ORDER — GABAPENTIN 400 MG/1
CAPSULE ORAL
Qty: 90 CAPSULE | Refills: 1 | OUTPATIENT
Start: 2017-12-14

## 2018-01-13 DIAGNOSIS — G89.29 CHRONIC LOW BACK PAIN WITHOUT SCIATICA, UNSPECIFIED BACK PAIN LATERALITY: ICD-10-CM

## 2018-01-13 DIAGNOSIS — M54.50 CHRONIC LOW BACK PAIN WITHOUT SCIATICA, UNSPECIFIED BACK PAIN LATERALITY: ICD-10-CM

## 2018-01-15 RX ORDER — GABAPENTIN 400 MG/1
CAPSULE ORAL
Qty: 90 CAPSULE | Refills: 1 | OUTPATIENT
Start: 2018-01-15

## 2018-02-06 RX ORDER — MOMETASONE FUROATE AND FORMOTEROL FUMARATE DIHYDRATE 200; 5 UG/1; UG/1
AEROSOL RESPIRATORY (INHALATION)
Qty: 13 G | Refills: 5 | Status: SHIPPED | OUTPATIENT
Start: 2018-02-06 | End: 2018-02-26

## 2018-02-12 DIAGNOSIS — G89.29 CHRONIC LOW BACK PAIN WITHOUT SCIATICA, UNSPECIFIED BACK PAIN LATERALITY: ICD-10-CM

## 2018-02-12 DIAGNOSIS — M54.50 CHRONIC LOW BACK PAIN WITHOUT SCIATICA, UNSPECIFIED BACK PAIN LATERALITY: ICD-10-CM

## 2018-02-12 RX ORDER — GABAPENTIN 400 MG/1
CAPSULE ORAL
Qty: 90 CAPSULE | Refills: 1 | OUTPATIENT
Start: 2018-02-12

## 2018-02-26 ENCOUNTER — OFFICE VISIT (OUTPATIENT)
Dept: INTERNAL MEDICINE | Facility: CLINIC | Age: 53
End: 2018-02-26

## 2018-02-26 VITALS
TEMPERATURE: 97.9 F | BODY MASS INDEX: 291 KG/M2 | RESPIRATION RATE: 20 BRPM | HEART RATE: 80 BPM | SYSTOLIC BLOOD PRESSURE: 170 MMHG | WEIGHT: 246.6 LBS | DIASTOLIC BLOOD PRESSURE: 104 MMHG

## 2018-02-26 DIAGNOSIS — G89.29 CHRONIC MIDLINE LOW BACK PAIN WITHOUT SCIATICA: Primary | ICD-10-CM

## 2018-02-26 DIAGNOSIS — M25.60 JOINT STIFFNESS: ICD-10-CM

## 2018-02-26 DIAGNOSIS — G56.01 CARPAL TUNNEL SYNDROME OF RIGHT WRIST: ICD-10-CM

## 2018-02-26 DIAGNOSIS — K21.9 GASTROESOPHAGEAL REFLUX DISEASE WITHOUT ESOPHAGITIS: ICD-10-CM

## 2018-02-26 DIAGNOSIS — K59.09 OTHER CONSTIPATION: ICD-10-CM

## 2018-02-26 DIAGNOSIS — J44.9 CHRONIC OBSTRUCTIVE PULMONARY DISEASE, UNSPECIFIED COPD TYPE (HCC): ICD-10-CM

## 2018-02-26 DIAGNOSIS — R73.09 ABNORMAL GLUCOSE LEVEL: ICD-10-CM

## 2018-02-26 DIAGNOSIS — E78.5 HYPERLIPIDEMIA, UNSPECIFIED HYPERLIPIDEMIA TYPE: ICD-10-CM

## 2018-02-26 DIAGNOSIS — IMO0001 CLASS 3 OBESITY DUE TO EXCESS CALORIES WITH SERIOUS COMORBIDITY AND BODY MASS INDEX (BMI) OF 40.0 TO 44.9 IN ADULT: ICD-10-CM

## 2018-02-26 DIAGNOSIS — J30.89 SEASONAL ALLERGIC RHINITIS DUE TO OTHER ALLERGIC TRIGGER, UNSPECIFIED CHRONICITY: ICD-10-CM

## 2018-02-26 DIAGNOSIS — M54.50 CHRONIC MIDLINE LOW BACK PAIN WITHOUT SCIATICA: Primary | ICD-10-CM

## 2018-02-26 DIAGNOSIS — G47.09 OTHER INSOMNIA: ICD-10-CM

## 2018-02-26 DIAGNOSIS — I10 ESSENTIAL HYPERTENSION: ICD-10-CM

## 2018-02-26 DIAGNOSIS — F41.9 ANXIETY: ICD-10-CM

## 2018-02-26 DIAGNOSIS — F32.A DEPRESSION, UNSPECIFIED DEPRESSION TYPE: ICD-10-CM

## 2018-02-26 PROBLEM — H26.9 CATARACT: Status: ACTIVE | Noted: 2018-02-26

## 2018-02-26 PROBLEM — Z11.59 NEED FOR HEPATITIS C SCREENING TEST: Status: RESOLVED | Noted: 2017-02-07 | Resolved: 2018-02-26

## 2018-02-26 PROBLEM — H40.9 GLAUCOMA OF BOTH EYES: Status: ACTIVE | Noted: 2018-02-26

## 2018-02-26 PROCEDURE — 99214 OFFICE O/P EST MOD 30 MIN: CPT | Performed by: NURSE PRACTITIONER

## 2018-02-26 RX ORDER — LISINOPRIL 20 MG/1
20 TABLET ORAL NIGHTLY
Qty: 30 TABLET | Refills: 1 | Status: SHIPPED | OUTPATIENT
Start: 2018-02-26 | End: 2018-03-26 | Stop reason: SDUPTHER

## 2018-02-26 RX ORDER — SIMVASTATIN 20 MG
20 TABLET ORAL NIGHTLY
Qty: 30 TABLET | Refills: 1 | Status: SHIPPED | OUTPATIENT
Start: 2018-02-26 | End: 2018-03-26 | Stop reason: SDUPTHER

## 2018-02-26 RX ORDER — LANSOPRAZOLE 15 MG/1
15 CAPSULE, DELAYED RELEASE ORAL DAILY
Qty: 60 CAPSULE | Refills: 1 | Status: SHIPPED | OUTPATIENT
Start: 2018-02-26 | End: 2018-07-10 | Stop reason: SDUPTHER

## 2018-02-26 RX ORDER — BUDESONIDE AND FORMOTEROL FUMARATE DIHYDRATE 160; 4.5 UG/1; UG/1
2 AEROSOL RESPIRATORY (INHALATION)
COMMUNITY
End: 2018-02-26 | Stop reason: SDUPTHER

## 2018-02-26 RX ORDER — BUDESONIDE AND FORMOTEROL FUMARATE DIHYDRATE 160; 4.5 UG/1; UG/1
2 AEROSOL RESPIRATORY (INHALATION)
Qty: 1 INHALER | Refills: 1 | Status: SHIPPED | OUTPATIENT
Start: 2018-02-26 | End: 2018-03-02

## 2018-02-26 RX ORDER — FLUTICASONE PROPIONATE 50 MCG
1 SPRAY, SUSPENSION (ML) NASAL DAILY
Qty: 1 BOTTLE | Refills: 1 | Status: SHIPPED | OUTPATIENT
Start: 2018-02-26 | End: 2018-03-26 | Stop reason: SDUPTHER

## 2018-02-26 NOTE — PROGRESS NOTES
Chief Complaint   Patient presents with   • Hypertension     re-establish care, former Dr. Ma patient        Subjective     History of Present Illness   The pt is here today to re-establish care.    Was on gabapentin for scoliosis and wants something else meloxicam.  Low back pain no trauma stiff mid of lwo back no radiation no other treatment does not want ot take gabapentin.   Been of all meds except had rf for gabapentin    gerd been only occasional exacerbations and takes bid at that time and helps.     Takes dulera and spiriva daily as directed    The following portions of the patient's history were reviewed and updated as appropriate: allergies, current medications, past family history, past medical history, past social history, past surgical history and problem list.    Review of Systems   Constitutional: Negative for activity change, appetite change, chills, diaphoresis, fatigue and fever.   HENT: Negative for congestion, sinus pressure, sore throat and trouble swallowing.    Respiratory: Negative for cough and shortness of breath.    Cardiovascular: Negative for chest pain, palpitations and leg swelling.   Gastrointestinal: Negative for abdominal pain, constipation, nausea and vomiting.   Endocrine: Negative for polydipsia, polyphagia and polyuria.   Genitourinary: Negative for difficulty urinating, dysuria and flank pain.   Musculoskeletal: Negative for arthralgias.   Skin: Negative for rash.   Allergic/Immunologic: Negative for environmental allergies.   Neurological: Negative for dizziness and headaches.   Psychiatric/Behavioral: Negative for sleep disturbance.   All other systems reviewed and are negative.        Objective   Physical Exam   Constitutional: She is oriented to person, place, and time. She appears well-developed and well-nourished.   HENT:   Head: Normocephalic and atraumatic.   Right Ear: Hearing, tympanic membrane, external ear and ear canal normal.   Left Ear: Hearing, tympanic  membrane, external ear and ear canal normal.   Nose: Nose normal. Right sinus exhibits no maxillary sinus tenderness and no frontal sinus tenderness. Left sinus exhibits no maxillary sinus tenderness and no frontal sinus tenderness.   Mouth/Throat: Uvula is midline, oropharynx is clear and moist and mucous membranes are normal.   Eyes: Conjunctivae are normal. Pupils are equal, round, and reactive to light. Right eye exhibits no discharge. Left eye exhibits no discharge. No scleral icterus.   Neck: Normal range of motion. Neck supple. No thyromegaly present.   Cardiovascular: Normal rate, regular rhythm, normal heart sounds and intact distal pulses.  Exam reveals no friction rub.    No murmur heard.  Pulmonary/Chest: Effort normal and breath sounds normal.   Abdominal: Soft. Bowel sounds are normal. She exhibits no distension and no mass. There is no tenderness. There is no rebound and no guarding. No hernia.   Musculoskeletal: Normal range of motion. She exhibits no edema.   Lymphadenopathy:     She has no cervical adenopathy.   Neurological: She is alert and oriented to person, place, and time. She has normal reflexes.   Skin: Skin is warm and dry.   Psychiatric: She has a normal mood and affect. Her behavior is normal. Judgment and thought content normal.   Nursing note and vitals reviewed.      Results for orders placed or performed in visit on 07/06/17   Comprehensive Metabolic Panel   Result Value Ref Range    Glucose 113 (H) 70 - 100 mg/dL    BUN 16 9 - 23 mg/dL    Creatinine 0.60 0.60 - 1.30 mg/dL    Sodium 136 132 - 146 mmol/L    Potassium 4.4 3.5 - 5.5 mmol/L    Chloride 98 (L) 99 - 109 mmol/L    CO2 26.0 20.0 - 31.0 mmol/L    Calcium 9.9 8.7 - 10.4 mg/dL    Total Protein 7.4 5.7 - 8.2 g/dL    Albumin 4.60 3.20 - 4.80 g/dL    ALT (SGPT) 35 7 - 40 U/L    AST (SGOT) 28 0 - 33 U/L    Alkaline Phosphatase 99 25 - 100 U/L    Total Bilirubin 0.4 0.3 - 1.2 mg/dL    eGFR Non African Amer 105 >60 mL/min/1.73     Globulin 2.8 gm/dL    A/G Ratio 1.6 1.5 - 2.5 g/dL    BUN/Creatinine Ratio 26.7 (H) 7.0 - 25.0    Anion Gap 12.0 (H) 3.0 - 11.0 mmol/L   Lipid Panel   Result Value Ref Range    Total Cholesterol 191 0 - 200 mg/dL    Triglycerides 160 (H) 0 - 150 mg/dL    HDL Cholesterol 51 40 - 60 mg/dL    LDL Cholesterol  120 0 - 130 mg/dL   CBC (No Diff)   Result Value Ref Range    WBC 9.68 3.50 - 10.80 10*3/mm3    RBC 5.03 3.89 - 5.14 10*6/mm3    Hemoglobin 13.7 11.5 - 15.5 g/dL    Hematocrit 43.9 34.5 - 44.0 %    MCV 87.3 80.0 - 99.0 fL    MCH 27.2 27.0 - 31.0 pg    MCHC 31.2 (L) 32.0 - 36.0 g/dL    RDW 13.9 11.3 - 14.5 %    RDW-SD 44.3 37.0 - 54.0 fl    MPV 13.3 (H) 6.0 - 12.0 fL    Platelets 182 150 - 450 10*3/mm3        Assessment/Plan   Adrianne was seen today for hypertension.    Diagnoses and all orders for this visit:    Chronic midline low back pain without sciatica  -     XR Spine Lumbar 2 or 3 View (In Office)  -     Ambulatory Referral to Physical Therapy Evaluate and treat  -     Ambulatory Referral to Physical Medicine Rehab    Essential hypertension  -     lisinopril (PRINIVIL,ZESTRIL) 20 MG tablet; Take 1 tablet by mouth Every Night.    Hyperlipidemia, unspecified hyperlipidemia type  -     simvastatin (ZOCOR) 20 MG tablet; Take 1 tablet by mouth Every Night.    Chronic obstructive pulmonary disease, unspecified COPD type  -     tiotropium (SPIRIVA HANDIHALER) 18 MCG per inhalation capsule; Place 1 capsule into inhaler and inhale Daily.    Seasonal allergic rhinitis due to other allergic trigger, unspecified chronicity  -     fluticasone (FLONASE) 50 MCG/ACT nasal spray; 1 spray into each nostril Daily.  -     budesonide-formoterol (SYMBICORT) 160-4.5 MCG/ACT inhaler; Inhale 2 puffs 2 (Two) Times a Day.    Gastroesophageal reflux disease without esophagitis  -     lansoprazole (PREVACID) 15 MG capsule; Take 1 capsule by mouth Daily.    Other constipation    Carpal tunnel syndrome of right wrist    Joint  stiffness    Anxiety  -     sertraline (ZOLOFT) 50 MG tablet; Take 1 tablet by mouth Daily.    Abnormal glucose level    Other insomnia    Class 3 obesity due to excess calories with serious comorbidity and body mass index (BMI) of 40.0 to 44.9 in adult    Depression, unspecified depression type  -     sertraline (ZOLOFT) 50 MG tablet; Take 1 tablet by mouth Daily.    nml pap 7/17 no pmh abnml pap recheck 2020  bp up off med recheck better however 160/98 restart meds and check in one month at [facility] fasting.   F/u xray as made available.   Return for Annual, fasting .  RTC/call  If symptoms worsen  Meds MOA and SE's reviewed and pt v/u

## 2018-03-01 ENCOUNTER — PRIOR AUTHORIZATION (OUTPATIENT)
Dept: INTERNAL MEDICINE | Facility: CLINIC | Age: 53
End: 2018-03-01

## 2018-03-01 NOTE — TELEPHONE ENCOUNTER
PA submitted via [facility], Key: [id number]. Per [employer], waiting up to 72 hours for a response. Pt has tried and failed Tudorza, Dulera, and Spiriva. Preferred for [employer] was Breo which pt has never tried, but has been taking Symbicort since before 2014. If denied, can send in Breo to see if they will cover that cost.

## 2018-03-08 ENCOUNTER — HOSPITAL ENCOUNTER (OUTPATIENT)
Dept: PHYSICAL THERAPY | Facility: HOSPITAL | Age: 53
Setting detail: THERAPIES SERIES
Discharge: HOME | End: 2018-03-08

## 2018-03-08 DIAGNOSIS — M54.50 CHRONIC MIDLINE LOW BACK PAIN WITHOUT SCIATICA: Primary | ICD-10-CM

## 2018-03-08 DIAGNOSIS — G89.29 CHRONIC MIDLINE LOW BACK PAIN WITHOUT SCIATICA: Primary | ICD-10-CM

## 2018-03-08 PROCEDURE — 97161 PT EVAL LOW COMPLEX 20 MIN: CPT | Performed by: PHYSICAL THERAPIST

## 2018-03-08 NOTE — THERAPY EVALUATION
Outpatient Physical Therapy Ortho Initial Evaluation   [place]     Patient Name: Adrianne Lowe  : 1965  MRN: [id number]  Today's Date: 3/8/2018      Visit Date: 2018    Patient Active Problem List   Diagnosis   • Anxiety   • Auditory hallucinations   • Back pain   • Chronic obstructive pulmonary disease   • Depression   • Abnormal glucose level   • Gastroesophageal reflux disease without esophagitis   • Hypertension   • Hyperlipidemia   • Insomnia   • Joint stiffness   • Delusional disorder   • Constipation   • Seasonal allergies   • Carpal tunnel syndrome of right wrist   • Glaucoma of both eyes   • Cataract        Past Medical History:   Diagnosis Date   • Anxiety    • Depression    • Elevated cholesterol    • Glaucoma    • Heartburn    • Pulmonary emphysema         Past Surgical History:   Procedure Laterality Date   •  SECTION     • CHOLECYSTECTOMY         Visit Dx:     ICD-10-CM ICD-9-CM   1. Chronic midline low back pain without sciatica M54.5 724.2    G89.29 338.29             Patient History       18 1000          History    Chief Complaint Difficulty with daily activities;Pain  -RB      Type of Pain Back pain  -RB      Brief Description of Current Complaint Low back pn for about 5 years, insidious in onset. Has had XR showing scoliosis. Pn constant, radiates into R leg. Worsened by sitting in one position or standing for too long, bending forward such as to lean over her sink and brush her teeth. Denies numbness or tingling. Unable to lift/carry heavy objects, do house work, etc. Gets relief only w/ medication. Legs feel heavy.  -RB      Previous treatment for THIS PROBLEM Medication  -RB      Onset Date- PT 2018  -RB      Patient/Caregiver Goals Relieve pain;Know what to do to help the symptoms  -RB      Current Tobacco Use None  -RB      Patient's Rating of General Health Fair  -RB      Hand Dominance right-handed  -RB      Occupation/sports/leisure  activities Pt is unemployed.  -RB      Patient seeing anyone else for problem(s)? Yes  -RB      Pain     Pain Location Back;Leg  -RB      Pain at Present 4  -RB      Pain at Best 0  -RB      Pain at Worst 10  -RB      Pain Frequency Constant/continuous  -RB      Pain Description Aching;Sore  -RB      Fall Risk Assessment    Any falls in the past year: No  -RB      Daily Activities    Primary Language English  -RB      Action taken if English not primary language --  -RB      Are you able to read Yes  -RB      Are you able to write Yes  -RB      Teaching needs identified Home Exercise Program;Management of Condition  -RB      Patient is concerned about/has problems with Difficulty with self care (i.e. bathing, dressing, toileting:;Performing home management (household chores, shopping, care of dependents);Sitting;Standing  -RB      Does patient have problems with the following? Depression;Anxiety;Panic Attack  -RB      Barriers to learning None  -RB      Pt Participated in POC and Goals Yes  -RB      Safety    Are you being hurt, hit, or frightened by anyone at home or in your life? No  -RB      Are you being neglected by a caregiver No  -RB        User Key  (r) = Recorded By, (t) = Taken By, (c) = Cosigned By    Initials Name Provider Type    RB Kaylan Sanabria, PT Physical Therapist                PT Ortho       03/08/18 1000    Special Tests/Palpation    Special Tests/Palpation Lumbar/SI  -RB    Lumbosacral Palpation    Lumbosacral Segment Bilateral:;Tender  -RB    Gluteus Joe Bilateral:;Tender  -RB    Erector Spinae (Paraspinals) Bilateral:;Tender  -RB    Lumbosacral Palpation? Yes  -RB    Lumbar/SI Special Tests    SLR (Neural Tension) Negative  -RB    SI Compression Test (SI Dysfunction) Negative  -RB    SI Distraction Test (SI Dysfunction) Negative  -RB    RODGER (hip vs. SI Dysfunction) Negative  -RB    Sacral Spring Test (SI Dysfunction) Negative  -RB    ROM (Range of Motion)    General ROM Detail trunk  flxn WNL w/o pn, extn 50% w/ pn B low back, rotation 50% B w/ pn, SB WNL to R/75% to L w/ pn in R low back.  -RB    MMT (Manual Muscle Testing)    General MMT Assessment lower extremity strength deficits identified  -RB    Left Hip    Hip Flexion Gross Movement (4+/5) good plus  -RB    Hip Extension Gross Movement (4-/5) good minus  -RB    Hip ABduction Gross Movement (4-/5) good minus  -RB    Hip Ext (Lat) Rotation Gross Movement (4-/5) good minus  -RB    Right Hip    Hip Flexion Gross Movement (4+/5) good plus  -RB    Hip Extension Gluteus Joe (4-/5) good minus  -RB    Hip ABduction Gross Movement (4-/5) good minus  -RB    Hip Ext (Lat) Rotation Gross Movement (4-/5) good minus  -RB    Lower Extremity    Lower Ext Manual Muscle Testing left hip strength deficit;right hip strength deficit;left knee strength deficit;right knee strength deficit;left ankle strength deficit;right ankle strength deficit  -RB    Left Knee    Knee Extension Gross Movement (4/5) good  -RB    Knee Flexion Gross Movement (4/5) good  -RB    Right Knee    Knee Extension Gross Movement (4/5) good  -RB    Knee Flexion Gross Movement (4-/5) good minus  -RB    Flexibility    Flexibility Tested? Lower Extremity  -RB    Lower Extremity Flexibility    Hamstrings WNL  -RB      User Key  (r) = Recorded By, (t) = Taken By, (c) = Cosigned By    Initials Name Provider Type    RB Kaylan Sanabria, PT Physical Therapist                      Therapy Education  Education Details: Issued initial HEP including supine SLR, SL hip abd, supine clam w/ TB, bridges, and mini squats. Discussed impact of sedentary liftestyle/weight on overall strength, mobility, and pn and recommended pt initiate low level cardio activity if cleared by MD such as walking for weight reduction.  Given: HEP  Program: New  How Provided: Verbal, Written  Provided to: Patient  Level of Understanding: Teach back education performed           PT OP Goals       03/08/18 1113 03/08/18 1100     PT Short Term Goals    STG Date to Achieve  03/29/18  -RB    STG 1  Pt to be compliant w/ initial HEP for strengthening and symptom mgmt  -RB    STG 1 Progress  New  -RB    STG 2  Pt to report pn at worst no greater than 7/10.  -RB    STG 2 Progress  New  -RB    STG 3  Pt to demo functional trunk mobility in all planes w/ minimal to no increase in pn.  -RB    STG 3 Progress  New  -RB    Long Term Goals    LTG Date to Achieve  04/19/18  -RB    LTG 1  Pt to be independent w/ long term HEP for strengthening and symptom mgmt  -RB    LTG 1 Progress  New  -RB    LTG 2  Pt to report pn w/ daily activity at worst 5/10.  -RB    LTG 2 Progress  New  -RB    LTG 3  Pt to improve B hip abd strength to at least 4/5.  -RB    LTG 3 Progress  New  -RB    LTG 4  Pt to improve B hip extn strength to at least 4/5.  -RB    LTG 4 Progress  New  -RB    LTG 5  Pt to improve Mod Oswestry score by at least 8 pts to reflect improved pn and function.  -RB    LTG 5 Progress  New  -RB    Time Calculation    PT Goal Re-Cert Due Date 06/06/18  -RB 06/06/18  -RB      User Key  (r) = Recorded By, (t) = Taken By, (c) = Cosigned By    Initials Name Provider Type    RB Kaylan Sanabria, PT Physical Therapist                PT Assessment/Plan       03/08/18 1110       PT Assessment    Functional Limitations Limitations in functional capacity and performance;Limitation in home management;Impaired locomotion  -RB     Impairments Muscle strength;Pain;Range of motion  -RB     Assessment Comments Pt presents w/ complaint of chronic lower back and RLE pn, likely 2/2 to generalized weakness and deconditioning, morbid obesity. Pt demonstrates deficits in core and BLE strength, trunk mobility limiting tolerance to daily activities. Pt may benefit from skilled PT services to address deficits, decrease pn, and maximize function.  -RB     Please refer to paper survey for additional self-reported information Yes  -RB     Rehab Potential Fair  -RB     Patient/caregiver  participated in establishment of treatment plan and goals Yes  -RB     Patient would benefit from skilled therapy intervention Yes  -RB     PT Plan    PT Frequency 1x/week  -RB     Predicted Duration of Therapy Intervention (days/wks) 8 wks  -RB     Planned CPT's? PT EVAL LOW COMPLEXITY: 45264;PT RE-EVAL: 16233;PT THER PROC EA 15 MIN: 02749;PT MANUAL THERAPY EA 15 MIN: 85877;PT NEUROMUSC RE-EDUCATION EA 15 MIN: 69816;PT HOT/COLD PACK WC NONMCARE: 33073  -RB     PT Plan Comments PT POC to include progressive core stabilization, BLE strengthening, stretching, manual therapy techniques, modalities as indicated for pn control.  -RB       User Key  (r) = Recorded By, (t) = Taken By, (c) = Cosigned By    Initials Name Provider Type    RB Kaylan Sanabria, PT Physical Therapist                              Outcome Measure Options: Modifed Owmarisol  Modified Oswestry  Modified Oswestry Score/Comments: 31/50      Time Calculation:   Start Time: 1030     Therapy Charges for Today     Code Description Service Date Service Provider Modifiers Qty    80473763152  PT EVAL LOW COMPLEXITY 3 3/8/2018 Kaylan Sanabria, PT GP 1          PT G-Codes  Outcome Measure Options: Modifed Owestry         Kaylan Sanabria, PT  3/8/2018

## 2018-03-15 ENCOUNTER — HOSPITAL ENCOUNTER (OUTPATIENT)
Dept: PHYSICAL THERAPY | Facility: HOSPITAL | Age: 53
Setting detail: THERAPIES SERIES
Discharge: HOME | End: 2018-03-15

## 2018-03-15 DIAGNOSIS — K59.09 OTHER CONSTIPATION: ICD-10-CM

## 2018-03-15 DIAGNOSIS — G89.29 CHRONIC MIDLINE LOW BACK PAIN WITHOUT SCIATICA: Primary | ICD-10-CM

## 2018-03-15 DIAGNOSIS — M54.50 CHRONIC MIDLINE LOW BACK PAIN WITHOUT SCIATICA: Primary | ICD-10-CM

## 2018-03-15 PROCEDURE — 97110 THERAPEUTIC EXERCISES: CPT | Performed by: PHYSICAL THERAPIST

## 2018-03-15 RX ORDER — DICYCLOMINE HCL 20 MG
TABLET ORAL
Qty: 60 TABLET | Refills: 2 | Status: SHIPPED | OUTPATIENT
Start: 2018-03-15 | End: 2018-03-15

## 2018-03-15 RX ORDER — CETIRIZINE HYDROCHLORIDE 10 MG/1
10 TABLET ORAL DAILY
Qty: 30 TABLET | Refills: 5 | Status: SHIPPED | OUTPATIENT
Start: 2018-03-15 | End: 2018-08-13 | Stop reason: SDUPTHER

## 2018-03-15 NOTE — THERAPY TREATMENT NOTE
Outpatient Physical Therapy Ortho Treatment Note   [place]     Patient Name: Adrianne Lowe  : 1965  MRN: [id number]  Today's Date: 3/15/2018      Visit Date: 03/15/2018    Visit Dx:    ICD-10-CM ICD-9-CM   1. Chronic midline low back pain without sciatica M54.5 724.2    G89.29 338.29       Patient Active Problem List   Diagnosis   • Anxiety   • Auditory hallucinations   • Back pain   • Chronic obstructive pulmonary disease   • Depression   • Abnormal glucose level   • Gastroesophageal reflux disease without esophagitis   • Hypertension   • Hyperlipidemia   • Insomnia   • Joint stiffness   • Delusional disorder   • Constipation   • Seasonal allergies   • Carpal tunnel syndrome of right wrist   • Glaucoma of both eyes   • Cataract        Past Medical History:   Diagnosis Date   • Anxiety    • Depression    • Elevated cholesterol    • Glaucoma    • Heartburn    • Pulmonary emphysema         Past Surgical History:   Procedure Laterality Date   •  SECTION     • CHOLECYSTECTOMY               PT Ortho     Row Name 03/15/18 1000       Subjective Comments    Subjective Comments Has been working on HEP at home and exercises all going well, denies issues. Pn across lower back bilaterally today.  -RB       Subjective Pain    Able to rate subjective pain? yes  -RB    Pre-Treatment Pain Level 6  -RB    Post-Treatment Pain Level 6  -RB      User Key  (r) = Recorded By, (t) = Taken By, (c) = Cosigned By    Initials Name Provider Type    RB Kaylan Sanabria, PT Physical Therapist                            PT Assessment/Plan     Row Name 03/15/18 1028          PT Assessment    Assessment Comments Pt compliant w/ initial HEP and did well ther ex in clinic today, limited primarily by fatigue. Discussed progressing reps w/ HEP as pn/fatigue allow.  -RB        PT Plan    PT Plan Comments Cont POC- add zig zag walks w/ TB  -RB       User Key  (r) = Recorded By, (t) = Taken By, (c) = Cosigned By    Initials  Name Provider Type    DEBORAH Sanabria PT Physical Therapist                    Exercises     Row Name 03/15/18 1000             Subjective Comments    Subjective Comments Has been working on HEP at home and exercises all going well, denies issues. Pn across lower back bilaterally today.  -RB         Subjective Pain    Able to rate subjective pain? yes  -RB      Pre-Treatment Pain Level 6  -RB      Post-Treatment Pain Level 6  -RB         Exercise 1    Exercise Name 1 Initiated ther ex in clinic as per flow sheet w/ focus on low level hip and core strengthening.  -RB      Time 1 28  -RB        User Key  (r) = Recorded By, (t) = Taken By, (c) = Cosigned By    Initials Name Provider Type    DEBORAH Sanabria PT Physical Therapist                               PT OP Goals     Row Name 03/15/18 1029          Time Calculation    PT Goal Re-Cert Due Date 06/06/18  -RB       User Key  (r) = Recorded By, (t) = Taken By, (c) = Cosigned By    Initials Name Provider Type    RB Kaylan Sanabria PT Physical Therapist          Therapy Education  Education Details: discussed progressing reps w/ HEP to 20 if tolerated  Given: HEP  Program: Modified  How Provided: Verbal  Provided to: Patient  Level of Understanding: Verbalized              Time Calculation:   Start Time: 1000  Total Timed Code Minutes- PT: 28 minute(s)    Therapy Charges for Today     Code Description Service Date Service Provider Modifiers Qty    78154197642 HC PT THER PROC EA 15 MIN 3/15/2018 Kaylan Sanabria, PT GP 2                    Kaylan Sanabria, PT  3/15/2018

## 2018-03-15 NOTE — TELEPHONE ENCOUNTER
Patient pharmacy requesting refill of dicyclomine.  Please advise.   -Pt last seen  2/26/2018  -Last rx  10/17//2017. #60 with 2 refills.

## 2018-03-17 RX ORDER — DICYCLOMINE HCL 20 MG
TABLET ORAL
Qty: 60 TABLET | Refills: 2 | Status: SHIPPED | OUTPATIENT
Start: 2018-03-17 | End: 2018-10-15

## 2018-03-20 ENCOUNTER — TELEPHONE (OUTPATIENT)
Dept: INTERNAL MEDICINE | Facility: CLINIC | Age: 53
End: 2018-03-20

## 2018-03-22 ENCOUNTER — HOSPITAL ENCOUNTER (OUTPATIENT)
Dept: PHYSICAL THERAPY | Facility: HOSPITAL | Age: 53
Setting detail: THERAPIES SERIES
Discharge: HOME | End: 2018-03-22

## 2018-03-22 DIAGNOSIS — G89.29 CHRONIC MIDLINE LOW BACK PAIN WITHOUT SCIATICA: Primary | ICD-10-CM

## 2018-03-22 DIAGNOSIS — M54.50 CHRONIC MIDLINE LOW BACK PAIN WITHOUT SCIATICA: Primary | ICD-10-CM

## 2018-03-22 PROCEDURE — 97110 THERAPEUTIC EXERCISES: CPT | Performed by: PHYSICAL THERAPIST

## 2018-03-22 NOTE — THERAPY TREATMENT NOTE
Outpatient Physical Therapy Ortho Treatment Note  [facility]     Patient Name: Adrianne Lowe  : 1965  MRN: [id number]  Today's Date: 3/22/2018      Visit Date: 2018    Visit Dx:    ICD-10-CM ICD-9-CM   1. Chronic midline low back pain without sciatica M54.5 724.2    G89.29 338.29       Patient Active Problem List   Diagnosis   • Anxiety   • Auditory hallucinations   • Back pain   • Chronic obstructive pulmonary disease   • Depression   • Abnormal glucose level   • Gastroesophageal reflux disease without esophagitis   • Hypertension   • Hyperlipidemia   • Insomnia   • Joint stiffness   • Delusional disorder   • Constipation   • Seasonal allergies   • Carpal tunnel syndrome of right wrist   • Glaucoma of both eyes   • Cataract        Past Medical History:   Diagnosis Date   • Anxiety    • Depression    • Elevated cholesterol    • Glaucoma    • Heartburn    • Pulmonary emphysema         Past Surgical History:   Procedure Laterality Date   •  SECTION     • CHOLECYSTECTOMY               PT Ortho     Row Name 18 1000       Subjective Comments    Subjective Comments Can see an improvement in her pn. Reports some mild soreness in the low and mid back today, but little pn.  -RB       Subjective Pain    Able to rate subjective pain? yes  -RB    Pre-Treatment Pain Level 4  -RB    Post-Treatment Pain Level 3  -RB      User Key  (r) = Recorded By, (t) = Taken By, (c) = Cosigned By    Initials Name Provider Type    DEBORAH Sanabria, PT Physical Therapist                            PT Assessment/Plan     Row Name 18 1048          PT Assessment    Assessment Comments Pt tolerated ther ex well and reported decreased pn by end of tx, but fatigued. Required manual assist for balance w/ bird dogs. Pn improving.  -RB        PT Plan    PT Plan Comments Cont per POC  -RB       User Key  (r) = Recorded By, (t) = Taken By, (c) = Cosigned By    Initials Name Provider Type    DEBORAH Sanabria  PT Physical Therapist                    Exercises     Row Name 03/22/18 1000             Subjective Comments    Subjective Comments Can see an improvement in her pn. Reports some mild soreness in the low and mid back today, but little pn.  -RB         Subjective Pain    Able to rate subjective pain? yes  -RB      Pre-Treatment Pain Level 4  -RB      Post-Treatment Pain Level 3  -RB         Exercise 1    Exercise Name 1 Continued w/ ther ex in clinic as per flow sheet. Increased reps/resistance as tolerated, added Paloff Presses w/ TB, bird dog, and reviewed HEP additions.  -RB      Time 1 30  -RB        User Key  (r) = Recorded By, (t) = Taken By, (c) = Cosigned By    Initials Name Provider Type    RB Kaylan Sanabria, PT Physical Therapist                               PT OP Goals     Row Name 03/22/18 1049          Time Calculation    PT Goal Re-Cert Due Date 06/06/18  -RB       User Key  (r) = Recorded By, (t) = Taken By, (c) = Cosigned By    Initials Name Provider Type    RB Kaylan Sanabria PT Physical Therapist          Therapy Education  Education Details: updated HEP to include bird dog and standing hip abd w/ RTB  Given: HEP  Program: Progressed  How Provided: Verbal, Demonstration, Written  Provided to: Patient  Level of Understanding: Teach back education performed              Time Calculation:   Start Time: 1000  Total Timed Code Minutes- PT: 30 minute(s)    Therapy Charges for Today     Code Description Service Date Service Provider Modifiers Qty    42720268479 HC PT THER PROC EA 15 MIN 3/22/2018 Kaylan Sanabria, PT GP 2                    Kaylan Sanabria, PT  3/22/2018

## 2018-03-26 DIAGNOSIS — I10 ESSENTIAL HYPERTENSION: ICD-10-CM

## 2018-03-26 DIAGNOSIS — E78.5 HYPERLIPIDEMIA, UNSPECIFIED HYPERLIPIDEMIA TYPE: ICD-10-CM

## 2018-03-26 DIAGNOSIS — J30.89 SEASONAL ALLERGIC RHINITIS DUE TO OTHER ALLERGIC TRIGGER, UNSPECIFIED CHRONICITY: ICD-10-CM

## 2018-03-26 DIAGNOSIS — J44.9 CHRONIC OBSTRUCTIVE PULMONARY DISEASE, UNSPECIFIED COPD TYPE (HCC): ICD-10-CM

## 2018-03-27 RX ORDER — TIOTROPIUM BROMIDE 18 UG/1
CAPSULE ORAL; RESPIRATORY (INHALATION)
Qty: 30 CAPSULE | Refills: 1 | Status: SHIPPED | OUTPATIENT
Start: 2018-03-27 | End: 2018-06-19 | Stop reason: SDUPTHER

## 2018-03-27 RX ORDER — LISINOPRIL 20 MG/1
20 TABLET ORAL NIGHTLY
Qty: 30 TABLET | Refills: 1 | Status: SHIPPED | OUTPATIENT
Start: 2018-03-27 | End: 2018-05-21 | Stop reason: SDUPTHER

## 2018-03-27 RX ORDER — SIMVASTATIN 20 MG
TABLET ORAL
Qty: 30 TABLET | Refills: 1 | Status: SHIPPED | OUTPATIENT
Start: 2018-03-27 | End: 2018-06-12 | Stop reason: SDUPTHER

## 2018-03-27 RX ORDER — FLUTICASONE PROPIONATE 50 MCG
SPRAY, SUSPENSION (ML) NASAL
Qty: 16 G | Refills: 1 | Status: SHIPPED | OUTPATIENT
Start: 2018-03-27 | End: 2018-06-19 | Stop reason: SDUPTHER

## 2018-03-29 ENCOUNTER — HOSPITAL ENCOUNTER (OUTPATIENT)
Dept: PHYSICAL THERAPY | Facility: HOSPITAL | Age: 53
Setting detail: THERAPIES SERIES
Discharge: HOME | End: 2018-03-29

## 2018-03-29 DIAGNOSIS — G89.29 CHRONIC MIDLINE LOW BACK PAIN WITHOUT SCIATICA: Primary | ICD-10-CM

## 2018-03-29 DIAGNOSIS — M54.50 CHRONIC MIDLINE LOW BACK PAIN WITHOUT SCIATICA: Primary | ICD-10-CM

## 2018-03-29 PROCEDURE — 97110 THERAPEUTIC EXERCISES: CPT | Performed by: PHYSICAL THERAPIST

## 2018-03-29 NOTE — THERAPY TREATMENT NOTE
Outpatient Physical Therapy Ortho Treatment Note   [place]     Patient Name: Adrianne Lowe  : 1965  MRN: [id number]  Today's Date: 3/29/2018      Visit Date: 2018    Visit Dx:    ICD-10-CM ICD-9-CM   1. Chronic midline low back pain without sciatica M54.5 724.2    G89.29 338.29       Patient Active Problem List   Diagnosis   • Anxiety   • Auditory hallucinations   • Back pain   • Chronic obstructive pulmonary disease   • Depression   • Abnormal glucose level   • Gastroesophageal reflux disease without esophagitis   • Hypertension   • Hyperlipidemia   • Insomnia   • Joint stiffness   • Delusional disorder   • Constipation   • Seasonal allergies   • Carpal tunnel syndrome of right wrist   • Glaucoma of both eyes   • Cataract        Past Medical History:   Diagnosis Date   • Anxiety    • Depression    • Elevated cholesterol    • Glaucoma    • Heartburn    • Pulmonary emphysema         Past Surgical History:   Procedure Laterality Date   •  SECTION     • CHOLECYSTECTOMY               PT Ortho     Row Name 18 1100       Subjective Comments    Subjective Comments Feels stronger and pn is improving. Sore after last visit, but improved.  -RB       Subjective Pain    Able to rate subjective pain? yes  -RB    Pre-Treatment Pain Level 6  -RB    Post-Treatment Pain Level 4  -RB       MMT (Manual Muscle Testing)    Additional Documentation General Assessment (Manual Muscle Testing) (Group)  -RB       General Assessment (Manual Muscle Testing)    General Manual Muscle Testing (MMT) Assessment lower extremity strength deficits identified  -RB       Lower Extremity (Manual Muscle Testing)    Lower Extremity: Manual Muscle Testing (MMT) left hip strength deficit;right hip strength deficit  -RB       Left Hip (Manual Muscle Testing)    Left Hip Manual Muscle Testing (MMT) flexion;extension;abduction;external rotation  -RB    MMT: Flexion, Left Hip flexion  -RB    MMT, Gross Movement: Left  "Hip Flexion --   4+/5  -RB    MMT: Extension, Left Hip extension  -RB    MMT, Gross Movement: Left Hip Extension (4/5) good  -RB    MMT: ABduction, Left Hip abduction  -RB    MMT, Gross Movement: Left Hip ABduction (4/5) good  -RB       Right Hip (Manual Muscle Testing)    Right Hip Manual Muscle Testing (MMT) flexion;extension;abduction;external rotation  -RB    MMT: Flexion, Right Hip flexion  -RB    MMT, Gross Movement: Right Hip Flexion --   4+/5  -RB    MMT: Extension, Right Hip extension  -RB    MMT, Gross Movement: Right Hip Extension (4/5) good  -RB    MMT: ABduction, Right Hip abduction  -RB    MMT, Gross Movement: Right Hip ABduction (4/5) good  -RB      User Key  (r) = Recorded By, (t) = Taken By, (c) = Cosigned By    Initials Name Provider Type    DEBORAH Sanabria, PT Physical Therapist                            PT Assessment/Plan     Row Name 03/29/18 1127          PT Assessment    Assessment Comments Pt subjectively reports gradual reduction in pn. Demo improved technique w/ bird dogs today though still challenged. Able to perform modified plank activities w/o pn. Attempted slow walking on TM but deferred after 1-2 minutes 2/2 complaint of \"dreariness.\" BP elevated, but pt hypertensive at baseline. BP decreased w/ rest and symptoms subsided. She is progressing well toward goals and would benefit from continued PT to further progress strength and endurance.  -RB        PT Plan    PT Plan Comments Cont to progress B hip and core strengthening as pt tolerates. Update HEP to include progressed ther ex next visit pending insurance approval for more visits. POC next visit  -RB       User Key  (r) = Recorded By, (t) = Taken By, (c) = Cosigned By    Initials Name Provider Type    DEBORAH Sanabria, PT Physical Therapist                    Exercises     Row Name 03/29/18 1100             Subjective Comments    Subjective Comments Feels stronger and pn is improving. Sore after last visit, but improved.  -RB   " "      Subjective Pain    Able to rate subjective pain? yes  -RB      Pre-Treatment Pain Level 6  -RB      Post-Treatment Pain Level 4  -RB         Exercise 1    Exercise Name 1 Continued ther ex in clinic as per flow sheet. Added standing plank pushbacks, modified prone plank from knees/forearms, seated low back stretch. Attempted walking on TM, but limited to 1.5 minutes at .5 mph 2/2 report of \"dreariness\". BP immediately upon sitting 162/106, after 5 min rest 150/96.  -RB      Time 1 40  -RB        User Key  (r) = Recorded By, (t) = Taken By, (c) = Cosigned By    Initials Name Provider Type    DEBORAH Sanabria PT Physical Therapist                               PT OP Goals     Row Name 03/29/18 1135          Time Calculation    PT Goal Re-Cert Due Date 06/06/18  -RB       User Key  (r) = Recorded By, (t) = Taken By, (c) = Cosigned By    Initials Name Provider Type    DEBORAH Sanabria PT Physical Therapist          Therapy Education  Education Details: recommended pt contact MD regarding symptoms of dizziness w/ short distance ambulation, complaint of waking at night w/ \"throbbing\" in chest.              Time Calculation:   Start Time: 1045  Total Timed Code Minutes- PT: 40 minute(s)    Therapy Charges for Today     Code Description Service Date Service Provider Modifiers Qty    28179173354  PT THER PROC EA 15 MIN 3/29/2018 Kaylan Sanabria, PT GP 3                    Kaylan Sanabria, PT  3/29/2018     "

## 2018-04-02 RX ORDER — GABAPENTIN 400 MG/1
CAPSULE ORAL
Qty: 30 CAPSULE | Refills: 2 | OUTPATIENT
Start: 2018-04-02 | End: 2018-04-02 | Stop reason: SDUPTHER

## 2018-04-02 NOTE — TELEPHONE ENCOUNTER
Pharmacist was wondering if this was to be just a 10 day supply.  Would you like to change is to dispense 90?

## 2018-04-02 NOTE — TELEPHONE ENCOUNTER
Previously prescribed by Dr. Ma.  Please advise.   -Pt last seen  2/26/2018  -Last rx  8/22/2017  -CSA none on file   -U[facility]  none on file   -[facility]   None on file      Patient states that has been doing physical therapy.

## 2018-04-02 NOTE — TELEPHONE ENCOUNTER
----- Message from Christina Riggins sent at 4/2/2018  2:41 PM EDT -----  MICHAEL-PHARMACIST-[facility] [phone]    NEEDS CLARIFICATION ON GABAPENTIN RX

## 2018-04-03 ENCOUNTER — HOSPITAL ENCOUNTER (OUTPATIENT)
Dept: PHYSICAL THERAPY | Facility: HOSPITAL | Age: 53
Setting detail: THERAPIES SERIES
Discharge: HOME | End: 2018-04-03

## 2018-04-03 DIAGNOSIS — G89.29 CHRONIC MIDLINE LOW BACK PAIN WITHOUT SCIATICA: Primary | ICD-10-CM

## 2018-04-03 DIAGNOSIS — M54.50 CHRONIC MIDLINE LOW BACK PAIN WITHOUT SCIATICA: Primary | ICD-10-CM

## 2018-04-03 PROCEDURE — 97110 THERAPEUTIC EXERCISES: CPT | Performed by: PHYSICAL THERAPIST

## 2018-04-03 RX ORDER — GABAPENTIN 400 MG/1
400 CAPSULE ORAL 3 TIMES DAILY
Qty: 90 CAPSULE | Refills: 2 | OUTPATIENT
Start: 2018-04-03 | End: 2018-06-19 | Stop reason: SDUPTHER

## 2018-04-03 NOTE — THERAPY PROGRESS REPORT/RE-CERT
Outpatient Physical Therapy Ortho Re-Assessment   [facility]     Patient Name: Adrianne Lowe  : 1965  MRN: [id number]  Today's Date: 4/3/2018      Visit Date: 2018    Patient Active Problem List   Diagnosis   • Anxiety   • Auditory hallucinations   • Back pain   • Chronic obstructive pulmonary disease   • Depression   • Abnormal glucose level   • Gastroesophageal reflux disease without esophagitis   • Hypertension   • Hyperlipidemia   • Insomnia   • Joint stiffness   • Delusional disorder   • Constipation   • Seasonal allergies   • Carpal tunnel syndrome of right wrist   • Glaucoma of both eyes   • Cataract        Past Medical History:   Diagnosis Date   • Anxiety    • Depression    • Elevated cholesterol    • Glaucoma    • Heartburn    • Pulmonary emphysema         Past Surgical History:   Procedure Laterality Date   •  SECTION     • CHOLECYSTECTOMY         Visit Dx:     ICD-10-CM ICD-9-CM   1. Chronic midline low back pain without sciatica M54.5 724.2    G89.29 338.29                 PT Ortho     Row Name 18 1000       Subjective Comments    Subjective Comments Pn seems to be better than prior to starting PT. able to tolerate standing, cleaning for around 20 minutes before pn increases. Exercises becoming less difficult.  -RB       Subjective Pain    Able to rate subjective pain? yes  -RB    Pre-Treatment Pain Level 3  -RB    Post-Treatment Pain Level 5  -RB       Lumbar/SI Special Tests    SLR (Neural Tension) Negative  -RB    SI Compression Test (SI Dysfunction) Negative  -RB    SI Distraction Test (SI Dysfunction) Negative  -RB    RODGER (hip vs. SI Dysfunction) Negative  -RB    [place] Test (SI Dysfunction) Negative  -RB       General ROM    GENERAL ROM COMMENTS trunk mobility WNL but w/ pn mid low back in all planes.  -RB       Left Hip (Manual Muscle Testing)    MMT: Flexion, Left Hip flexion  -RB    MMT, Gross Movement: Left Hip Flexion --   4+/5  -RB    MMT:  Extension, Left Hip extension  -RB    MMT, Gross Movement: Left Hip Extension (4/5) good  -RB    MMT: ABduction, Left Hip abduction  -RB    MMT, Gross Movement: Left Hip ABduction --   4+/5  -RB       Right Hip (Manual Muscle Testing)    MMT: Flexion, Right Hip flexion  -RB    MMT, Gross Movement: Right Hip Flexion --   4+/5  -RB    MMT: Extension, Right Hip extension  -RB    MMT, Gross Movement: Right Hip Extension (4/5) good  -RB    MMT: ABduction, Right Hip abduction  -RB    MMT, Gross Movement: Right Hip ABduction (4/5) good  -RB       Lower Extremity Flexibility    Hamstrings WNL  -RB      User Key  (r) = Recorded By, (t) = Taken By, (c) = Cosigned By    Initials Name Provider Type    RB Kaylan Sanabria, PT Physical Therapist                      Therapy Education  Education Details: Issued updated HEP included countertop planks w/ pushaways, paloff presses, standing hip abd and extn w/ TB, supine clame w/ GTB, and birddogs. Issued GTB.  Given: HEP  Program: Progressed  How Provided: Verbal, Demonstration, Written  Provided to: Patient  Level of Understanding: Teach back education performed           PT OP Goals     Row Name 04/03/18 1303 04/03/18 1000       PT Short Term Goals    STG Date to Achieve  -- 03/29/18  -RB    STG 1  -- Pt to be compliant w/ initial HEP for strengthening and symptom mgmt  -RB    STG 1 Progress  -- Met  -RB    STG 2  -- Pt to report pn at worst no greater than 7/10.  -RB    STG 2 Progress  -- Ongoing  -RB    STG 3  -- Pt to demo functional trunk mobility in all planes w/ minimal to no increase in pn.  -RB    STG 3 Progress  -- Partially Met  -RB       Long Term Goals    LTG Date to Achieve  -- 04/19/18  -RB    LTG 1  -- Pt to be independent w/ long term HEP for strengthening and symptom mgmt  -RB    LTG 1 Progress  -- Ongoing  -RB    LTG 2  -- Pt to report pn w/ daily activity at worst 5/10.  -RB    LTG 2 Progress  -- Ongoing  -RB    LTG 3  -- Pt to improve B hip abd strength to at  least 4/5.  -RB    LTG 3 Progress  -- Met  -RB    LTG 4  -- Pt to improve B hip extn strength to at least 4/5.  -RB    LTG 4 Progress  -- Progressing  -RB    LTG 5  -- Pt to improve Mod Oswestry score by at least 8 pts to reflect improved pn and function.  -RB    LTG 5 Progress  -- Progressing  -RB       Time Calculation    PT Goal Re-Cert Due Date 06/06/18  -RB 06/06/18  -RB      User Key  (r) = Recorded By, (t) = Taken By, (c) = Cosigned By    Initials Name Provider Type    RB Kaylan Sanabria, PT Physical Therapist                PT Assessment/Plan     Row Name 04/03/18 1257          PT Assessment    Functional Limitations Limitations in functional capacity and performance;Limitation in home management  -RB     Impairments Muscle strength;Pain;Range of motion  -RB     Assessment Comments Pt progressing well w/ PT. She has made gains in B hip/core strength, trunk mobility and subjectively reports mild reduction in pn w/ daily activities, and increased tolerance to standing/walking. She continues to demo mild deficits in hip strength w/ more pronounced deficits in core strength, limiting her tolerance to lifting, standing, and walking. She is progressing well toward goals and would benefit from continued PT services.  -RB     Please refer to paper survey for additional self-reported information Yes  -RB     Rehab Potential Good  -RB     Patient/caregiver participated in establishment of treatment plan and goals Yes  -RB     Patient would benefit from skilled therapy intervention Yes  -RB        PT Plan    PT Frequency 1x/week  -RB     Predicted Duration of Therapy Intervention (OT Eval) 4 visits  -RB     Planned CPT's? PT RE-EVAL: 31886;PT THER PROC EA 15 MIN: 82018;PT MANUAL THERAPY EA 15 MIN: 27465;PT NEUROMUSC RE-EDUCATION EA 15 MIN: 28034;PT HOT/COLD PACK WC NONMCARE: 66429  -RB     PT Plan Comments Cont PT POC w/ focus on maximizing B hip and core strength. Assess response to updated HEP  -RB       User Key  (r)  = Recorded By, (t) = Taken By, (c) = Cosigned By    Initials Name Provider Type    DEBORAH Sanabria, PT Physical Therapist                  Exercises     Row Name 04/03/18 1000             Subjective Comments    Subjective Comments Pn seems to be better than prior to starting PT. able to tolerate standing, cleaning for around 20 minutes before pn increases. Exercises becoming less difficult.  -RB         Subjective Pain    Able to rate subjective pain? yes  -RB      Pre-Treatment Pain Level 3  -RB      Post-Treatment Pain Level 5  -RB         Exercise 1    Exercise Name 1 Performed reassessment then continued w/ ther ex in clinic as per flow sheet. Added LTR w/ legs supported on stability ball, increased reps as tolerated. Reviewed HEP additions.  -RB      Time 1 40  -RB        User Key  (r) = Recorded By, (t) = Taken By, (c) = Cosigned By    Initials Name Provider Type    DEBORAH Sanabria, PT Physical Therapist                        Outcome Measure Options: Modifed Owestry  Modified Oswestry  Modified Oswestry Score/Comments: 24/50      Time Calculation:   Start Time: 1000  Total Timed Code Minutes- PT: 40 minute(s)     Therapy Charges for Today     Code Description Service Date Service Provider Modifiers Qty    36012645533  PT THER PROC EA 15 MIN 4/3/2018 Kaylan Sanabria, PT GP 3          PT G-Codes  Outcome Measure Options: Modifed Owestry         Kaylan Sanabria, PT  4/3/2018

## 2018-04-11 ENCOUNTER — HOSPITAL ENCOUNTER (OUTPATIENT)
Dept: PHYSICAL THERAPY | Facility: HOSPITAL | Age: 53
Setting detail: THERAPIES SERIES
Discharge: HOME | End: 2018-04-11

## 2018-04-11 DIAGNOSIS — G89.29 CHRONIC MIDLINE LOW BACK PAIN WITHOUT SCIATICA: Primary | ICD-10-CM

## 2018-04-11 DIAGNOSIS — M54.50 CHRONIC MIDLINE LOW BACK PAIN WITHOUT SCIATICA: Primary | ICD-10-CM

## 2018-04-11 PROCEDURE — 97110 THERAPEUTIC EXERCISES: CPT | Performed by: PHYSICAL THERAPIST

## 2018-04-11 NOTE — THERAPY TREATMENT NOTE
Outpatient Physical Therapy Ortho Treatment Note   [place]     Patient Name: Adrianne Lowe  : 1965  MRN: [id number]  Today's Date: 2018      Visit Date: 2018    Visit Dx:    ICD-10-CM ICD-9-CM   1. Chronic midline low back pain without sciatica M54.5 724.2    G89.29 338.29       Patient Active Problem List   Diagnosis   • Anxiety   • Auditory hallucinations   • Back pain   • Chronic obstructive pulmonary disease   • Depression   • Abnormal glucose level   • Gastroesophageal reflux disease without esophagitis   • Hypertension   • Hyperlipidemia   • Insomnia   • Joint stiffness   • Delusional disorder   • Constipation   • Seasonal allergies   • Carpal tunnel syndrome of right wrist   • Glaucoma of both eyes   • Cataract        Past Medical History:   Diagnosis Date   • Anxiety    • Depression    • Elevated cholesterol    • Glaucoma    • Heartburn    • Pulmonary emphysema         Past Surgical History:   Procedure Laterality Date   •  SECTION     • CHOLECYSTECTOMY               PT Ortho     Row Name 18 0900       Subjective Comments    Subjective Comments Pt denies any pn this morning. Says she has good days and bad days, but even on bad days pn less intense than before.  -RB       Subjective Pain    Able to rate subjective pain? yes  -RB    Pre-Treatment Pain Level 0  -RB    Post-Treatment Pain Level 4  -RB      User Key  (r) = Recorded By, (t) = Taken By, (c) = Cosigned By    Initials Name Provider Type    RB Kaylan Sanabria, PT Physical Therapist                            PT Assessment/Plan     Row Name 18 1012          PT Assessment    Assessment Comments Pt pn free upon presenting to clinic today, but reported mild pn B low back by end of visit. She tolerated progressed ther ex well. Performance w/ modified plank activities improved today.  -RB        PT Plan    PT Plan Comments Cont per POC  -RB       User Key  (r) = Recorded By, (t) = Taken By, (c) = Cosigned  By    Initials Name Provider Type    RB Kaylan Sanabria, PT Physical Therapist                    Exercises     Row Name 04/11/18 0900             Subjective Comments    Subjective Comments Pt denies any pn this morning. Says she has good days and bad days, but even on bad days pn less intense than before.  -RB         Subjective Pain    Able to rate subjective pain? yes  -RB      Pre-Treatment Pain Level 0  -RB      Post-Treatment Pain Level 4  -RB         Exercise 1    Exercise Name 1 Ther ex in clinic as per flow sheet. Added quadruped hip ER, modified plank w/ knee pulls.  -RB      Time 1 25  -RB        User Key  (r) = Recorded By, (t) = Taken By, (c) = Cosigned By    Initials Name Provider Type    DEBORAH Sanabria, PT Physical Therapist                               PT OP Goals     Row Name 04/11/18 1013          Time Calculation    PT Goal Re-Cert Due Date 06/06/18  -RB       User Key  (r) = Recorded By, (t) = Taken By, (c) = Cosigned By    Initials Name Provider Type    DEBORAH Sanabria, PT Physical Therapist                         Time Calculation:   Start Time: 0945  Total Timed Code Minutes- PT: 25 minute(s)    Therapy Charges for Today     Code Description Service Date Service Provider Modifiers Qty    07913872683 HC PT THER PROC EA 15 MIN 4/11/2018 Kaylan Sanabria, PT GP 2                    Kaylan Sanabria, PT  4/11/2018

## 2018-04-13 ENCOUNTER — TELEPHONE (OUTPATIENT)
Dept: INTERNAL MEDICINE | Facility: CLINIC | Age: 53
End: 2018-04-13

## 2018-04-13 NOTE — TELEPHONE ENCOUNTER
Request for hydroxyzine rcvd via fax from [facility]. Per chart this medication was d/c'd on 2/26/18 as Therapy completed. Please advise?

## 2018-04-19 ENCOUNTER — HOSPITAL ENCOUNTER (OUTPATIENT)
Dept: PHYSICAL THERAPY | Facility: HOSPITAL | Age: 53
Setting detail: THERAPIES SERIES
Discharge: HOME | End: 2018-04-19

## 2018-04-19 ENCOUNTER — TELEPHONE (OUTPATIENT)
Dept: INTERNAL MEDICINE | Facility: CLINIC | Age: 53
End: 2018-04-19

## 2018-04-19 DIAGNOSIS — G89.29 CHRONIC MIDLINE LOW BACK PAIN WITHOUT SCIATICA: Primary | ICD-10-CM

## 2018-04-19 DIAGNOSIS — M54.50 CHRONIC MIDLINE LOW BACK PAIN WITHOUT SCIATICA: Primary | ICD-10-CM

## 2018-04-19 PROCEDURE — 97110 THERAPEUTIC EXERCISES: CPT | Performed by: PHYSICAL THERAPIST

## 2018-04-19 NOTE — TELEPHONE ENCOUNTER
----- Message from Britney Lopez sent at 4/19/2018  8:55 AM EDT -----  PATIENT CALLED IN AND STATED THAT SHE IS COUGHING UP STUFF, HEADACHE, CONGESTION AND THROAT HURTS. PATIENT IS WANTING SOMETHING CALLED INTO PHARMACY.     PLEASE CALL PATIENT AT : [phone]    THANK YOU

## 2018-04-19 NOTE — THERAPY TREATMENT NOTE
"    Outpatient Physical Therapy Ortho Treatment Note  [facility]     Patient Name: Adrianne Lowe  : 1965  MRN: [id number]  Today's Date: 2018      Visit Date: 2018    Visit Dx:    ICD-10-CM ICD-9-CM   1. Chronic midline low back pain without sciatica M54.5 724.2    G89.29 338.29       Patient Active Problem List   Diagnosis   • Anxiety   • Auditory hallucinations   • Back pain   • Chronic obstructive pulmonary disease   • Depression   • Abnormal glucose level   • Gastroesophageal reflux disease without esophagitis   • Hypertension   • Hyperlipidemia   • Insomnia   • Joint stiffness   • Delusional disorder   • Constipation   • Seasonal allergies   • Carpal tunnel syndrome of right wrist   • Glaucoma of both eyes   • Cataract        Past Medical History:   Diagnosis Date   • Anxiety    • Depression    • Elevated cholesterol    • Glaucoma    • Heartburn    • Pulmonary emphysema         Past Surgical History:   Procedure Laterality Date   •  SECTION     • CHOLECYSTECTOMY               PT Ortho     Row Name 18 0900       Subjective Comments    Subjective Comments Has been hurting a little more \"all over\", has been sick past couple of days.   -RB       Subjective Pain    Able to rate subjective pain? yes  -RB    Pre-Treatment Pain Level 5  -RB    Post-Treatment Pain Level 5  -RB      User Key  (r) = Recorded By, (t) = Taken By, (c) = Cosigned By    Initials Name Provider Type    RB Kaylan Sanabria, PT Physical Therapist                            PT Assessment/Plan     Row Name 18 1024          PT Assessment    Assessment Comments Pt reports increased pn today, though may be elevated due to current illness. She did well w/ ther ex progressions, reports mild low back and B hip discomfort w/ diagonal TB pulls.  -RB        PT Plan    PT Plan Comments Cont per POC- may update HEP next visit  -RB       User Key  (r) = Recorded By, (t) = Taken By, (c) = Cosigned By    Initials Name " "Provider Type    DEBORAH Sanabria PT Physical Therapist                    Exercises     Row Name 04/19/18 0900             Subjective Comments    Subjective Comments Has been hurting a little more \"all over\", has been sick past couple of days.   -RB         Subjective Pain    Able to rate subjective pain? yes  -RB      Pre-Treatment Pain Level 5  -RB      Post-Treatment Pain Level 5  -RB         Exercise 1    Exercise Name 1 Ther ex in clinic per flow sheet. Added KTC stretch, prayer stretch, progressed to GTB w/ standing hip abd/extn, added diagonal pulls w/ TB, standing abdominal curls w/ TB.  -RB      Time 1 25  -RB        User Key  (r) = Recorded By, (t) = Taken By, (c) = Cosigned By    Initials Name Provider Type    DEBORAH Sanabria PT Physical Therapist                               PT OP Goals     Row Name 04/19/18 1025          Time Calculation    PT Goal Re-Cert Due Date 06/06/18  -RB       User Key  (r) = Recorded By, (t) = Taken By, (c) = Cosigned By    Initials Name Provider Type    DEBORAH Sanabria PT Physical Therapist          Therapy Education  Education Details: pt to progress to GTB w/ HEP  Given: HEP  Program: Modified  How Provided: Verbal  Provided to: Patient  Level of Understanding: Verbalized              Time Calculation:   Start Time: 0955  Total Timed Code Minutes- PT: 25 minute(s)    Therapy Charges for Today     Code Description Service Date Service Provider Modifiers Qty    72022134059 HC PT THER PROC EA 15 MIN 4/19/2018 Kaylan Sanabria, PT GP 2                    Kaylan Sanabria, PT  4/19/2018     "

## 2018-04-19 NOTE — TELEPHONE ENCOUNTER
Allergies the patient been feeling bad?  Any fevers?  Any myalgias chills?  She is recently over the symptoms noted above I would consider a flu test.  If she has been sick greater than 67-10 days she should be seen and she may have a secondary bacteria and we need an antibiotic.  I do not normally prescribe antibiotics over the phone.  For mild recent cold symptoms I would have her take over-the-counter Mucinex DM however no significant due to her blood pressure.  She may take Afrin for 3-5 days only.  She can also continue her Flonase and add an Allegra for any drainage she is having.  Follow her gargles and Motrin for comfort may also be beneficial.

## 2018-04-20 ENCOUNTER — OFFICE VISIT (OUTPATIENT)
Dept: RETAIL CLINIC | Facility: CLINIC | Age: 53
End: 2018-04-20

## 2018-04-20 VITALS
RESPIRATION RATE: 18 BRPM | HEIGHT: 62 IN | WEIGHT: 245.6 LBS | OXYGEN SATURATION: 97 % | BODY MASS INDEX: 45.19 KG/M2 | SYSTOLIC BLOOD PRESSURE: 134 MMHG | HEART RATE: 90 BPM | TEMPERATURE: 98.2 F | DIASTOLIC BLOOD PRESSURE: 86 MMHG

## 2018-04-20 DIAGNOSIS — H66.003 ACUTE SUPPURATIVE OTITIS MEDIA OF BOTH EARS WITHOUT SPONTANEOUS RUPTURE OF TYMPANIC MEMBRANES, RECURRENCE NOT SPECIFIED: ICD-10-CM

## 2018-04-20 DIAGNOSIS — J02.9 SORE THROAT: Primary | ICD-10-CM

## 2018-04-20 DIAGNOSIS — R05.9 COUGH: ICD-10-CM

## 2018-04-20 LAB
EXPIRATION DATE: NORMAL
INTERNAL CONTROL: NORMAL
Lab: NORMAL
S PYO AG THROAT QL: NEGATIVE

## 2018-04-20 PROCEDURE — 99213 OFFICE O/P EST LOW 20 MIN: CPT | Performed by: NURSE PRACTITIONER

## 2018-04-20 PROCEDURE — 87880 STREP A ASSAY W/OPTIC: CPT | Performed by: NURSE PRACTITIONER

## 2018-04-20 RX ORDER — AMOXICILLIN 875 MG/1
875 TABLET, COATED ORAL 2 TIMES DAILY
Qty: 20 TABLET | Refills: 0 | Status: SHIPPED | OUTPATIENT
Start: 2018-04-20 | End: 2018-07-10

## 2018-04-20 RX ORDER — MOMETASONE FUROATE AND FORMOTEROL FUMARATE DIHYDRATE 200; 5 UG/1; UG/1
2 AEROSOL RESPIRATORY (INHALATION) 2 TIMES DAILY
Refills: 0 | COMMUNITY
Start: 2018-04-01 | End: 2018-08-20 | Stop reason: SDUPTHER

## 2018-04-20 RX ORDER — GUAIFENESIN AND DEXTROMETHORPHAN HYDROBROMIDE 600; 30 MG/1; MG/1
1 TABLET, EXTENDED RELEASE ORAL 2 TIMES DAILY
Qty: 20 TABLET | Refills: 0 | Status: SHIPPED | OUTPATIENT
Start: 2018-04-20 | End: 2018-04-30

## 2018-04-20 NOTE — PROGRESS NOTES
Gaby Lowe is a 52 y.o. female.     History of Present Illness   Patient presents with sore throat, headache, productive cough with green mucus and bilateral ear pain for 4 days. She is taking OTC cough syrup. She has a low grade fever and is using Tylenol. She has felt weak and tired over the last week as well.   The following portions of the patient's history were reviewed and updated as appropriate: allergies, current medications, past family history, past social history, past surgical history and problem list.    Review of Systems   Constitutional: Positive for activity change, fatigue and fever.   HENT: Positive for congestion, ear discharge, ear pain, sore throat and voice change. Negative for postnasal drip, rhinorrhea, sinus pain and sinus pressure.    Eyes: Negative.    Respiratory: Positive for cough. Negative for shortness of breath and wheezing.    Cardiovascular: Negative.    Gastrointestinal: Negative.    Musculoskeletal: Negative.    Skin: Negative.    Allergic/Immunologic: Negative.    Neurological: Negative.    Hematological: Negative.        Objective   Physical Exam   Constitutional: She is oriented to person, place, and time. Vital signs are normal. She appears well-developed and well-nourished.   HENT:   Head: Normocephalic and atraumatic.   Right Ear: Hearing, external ear and ear canal normal. Tympanic membrane is erythematous. Tympanic membrane is not injected. A middle ear effusion is present.   Left Ear: Hearing, external ear and ear canal normal. Tympanic membrane is erythematous. Tympanic membrane is not injected. A middle ear effusion is present.   Nose: Mucosal edema and rhinorrhea present. Right sinus exhibits no maxillary sinus tenderness and no frontal sinus tenderness.   Mouth/Throat: Mucous membranes are normal. Posterior oropharyngeal erythema present. No oropharyngeal exudate, posterior oropharyngeal edema or tonsillar abscesses. Tonsils are 0 on the right.  Tonsils are 0 on the left. No tonsillar exudate.   Eyes: Conjunctivae are normal.   Cardiovascular: Normal rate, regular rhythm and normal heart sounds.  Exam reveals no gallop and no friction rub.    No murmur heard.  Pulmonary/Chest: Effort normal and breath sounds normal. No respiratory distress. She has no wheezes. She has no rales.   Lymphadenopathy:     She has no cervical adenopathy.   Neurological: She is alert and oriented to person, place, and time.   Skin: Skin is warm. No rash noted.   Psychiatric: She has a normal mood and affect. Her behavior is normal.   Nursing note and vitals reviewed.      Assessment/Plan   Adrianne was seen today for headache, sore throat, cough and earache.    Diagnoses and all orders for this visit:    Sore throat  -     POCT rapid strep A    Acute suppurative otitis media of both ears without spontaneous rupture of tympanic membranes, recurrence not specified  -     amoxicillin (AMOXIL) 875 MG tablet; Take 1 tablet by mouth 2 (Two) Times a Day.    Cough  -     guaifenesin-dextromethorphan (MUCINEX DM)  MG tablet sustained-release 12 hour tablet; Take 1 tablet by mouth 2 (Two) Times a Day for 10 days.    ROMI Wang

## 2018-04-20 NOTE — PATIENT INSTRUCTIONS
Cough, Adult  A cough helps to clear your throat and lungs. A cough may last only 2-3 weeks (acute), or it may last longer than 8 weeks (chronic). Many different things can cause a cough. A cough may be a sign of an illness or another medical condition.  Follow these instructions at home:  · Pay attention to any changes in your cough.  · Take medicines only as told by your doctor.  ¨ If you were prescribed an antibiotic medicine, take it as told by your doctor. Do not stop taking it even if you start to feel better.  ¨ Talk with your doctor before you try using a cough medicine.  · Drink enough fluid to keep your pee (urine) clear or pale yellow.  · If the air is dry, use a cold steam vaporizer or humidifier in your home.  · Stay away from things that make you cough at work or at home.  · If your cough is worse at night, try using extra pillows to raise your head up higher while you sleep.  · Do not smoke, and try not to be around smoke. If you need help quitting, ask your doctor.  · Do not have caffeine.  · Do not drink alcohol.  · Rest as needed.  Contact a doctor if:  · You have new problems (symptoms).  · You cough up yellow fluid (pus).  · Your cough does not get better after 2-3 weeks, or your cough gets worse.  · Medicine does not help your cough and you are not sleeping well.  · You have pain that gets worse or pain that is not helped with medicine.  · You have a fever.  · You are losing weight and you do not know why.  · You have night sweats.  Get help right away if:  · You cough up blood.  · You have trouble breathing.  · Your heartbeat is very fast.  This information is not intended to replace advice given to you by your health care provider. Make sure you discuss any questions you have with your health care provider.  Document Released: 08/30/2012 Document Revised: 05/25/2017 Document Reviewed: 02/24/2016  [employer] Patient Education © 2017 [employer]  Otitis Media, Adult  Otitis media is  redness, soreness, and puffiness (swelling) in the space just behind your eardrum (middle ear). It may be caused by allergies or infection. It often happens along with a cold.  Follow these instructions at home:  · Take your medicine as told. Finish it even if you start to feel better.  · Only take over-the-counter or prescription medicines for pain, discomfort, or fever as told by your doctor.  · Follow up with your doctor as told.  Contact a doctor if:  · You have otitis media only in one ear, or bleeding from your nose, or both.  · You notice a lump on your neck.  · You are not getting better in 3-5 days.  · You feel worse instead of better.  Get help right away if:  · You have pain that is not helped with medicine.  · You have puffiness, redness, or pain around your ear.  · You get a stiff neck.  · You cannot move part of your face (paralysis).  · You notice that the bone behind your ear hurts when you touch it.  This information is not intended to replace advice given to you by your health care provider. Make sure you discuss any questions you have with your health care provider.  Document Released: 06/05/2009 Document Revised: 05/25/2017 Document Reviewed: 07/15/2014  [employer] Interactive Patient Education © 2017 [employer]

## 2018-04-26 ENCOUNTER — HOSPITAL ENCOUNTER (OUTPATIENT)
Dept: PHYSICAL THERAPY | Facility: HOSPITAL | Age: 53
Setting detail: THERAPIES SERIES
Discharge: HOME | End: 2018-04-26

## 2018-04-26 DIAGNOSIS — G89.29 CHRONIC MIDLINE LOW BACK PAIN WITHOUT SCIATICA: Primary | ICD-10-CM

## 2018-04-26 DIAGNOSIS — M54.50 CHRONIC MIDLINE LOW BACK PAIN WITHOUT SCIATICA: Primary | ICD-10-CM

## 2018-04-26 PROCEDURE — 97110 THERAPEUTIC EXERCISES: CPT | Performed by: PHYSICAL THERAPIST

## 2018-04-26 NOTE — THERAPY TREATMENT NOTE
Outpatient Physical Therapy Ortho Treatment Note   [place]     Patient Name: Adrianne Lowe  : 1965  MRN: [id number]  Today's Date: 2018      Visit Date: 2018    Visit Dx:    ICD-10-CM ICD-9-CM   1. Chronic midline low back pain without sciatica M54.5 724.2    G89.29 338.29       Patient Active Problem List   Diagnosis   • Anxiety   • Auditory hallucinations   • Back pain   • Chronic obstructive pulmonary disease   • Depression   • Abnormal glucose level   • Gastroesophageal reflux disease without esophagitis   • Hypertension   • Hyperlipidemia   • Insomnia   • Joint stiffness   • Delusional disorder   • Constipation   • Seasonal allergies   • Carpal tunnel syndrome of right wrist   • Glaucoma of both eyes   • Cataract        Past Medical History:   Diagnosis Date   • Anxiety    • Depression    • Elevated cholesterol    • Glaucoma    • Heartburn    • Pulmonary emphysema         Past Surgical History:   Procedure Laterality Date   •  SECTION     • CHOLECYSTECTOMY               PT Ortho     Row Name 18 1200       Subjective Comments    Subjective Comments Pt reports she has still been sick so has not peformed HEP as often. Believes that she is starting to lose some weight.  -RB       Subjective Pain    Able to rate subjective pain? yes  -RB    Pre-Treatment Pain Level 0  -RB    Post-Treatment Pain Level 4  -RB      User Key  (r) = Recorded By, (t) = Taken By, (c) = Cosigned By    Initials Name Provider Type    RB Kaylan Sanabria, PT Physical Therapist                            PT Assessment/Plan     Row Name 18 1251          PT Assessment    Assessment Comments Pt did well w/ ther ex today, limited more by fatigue than by pn. Updated HEP to include progressed core stab activities  -RB        PT Plan    PT Plan Comments reassess next visit and plan transition to independent HEP  -RB       User Key  (r) = Recorded By, (t) = Taken By, (c) = Cosigned By    Initials Name  Provider Type    RB Kaylan Sanabria, PT Physical Therapist                    Exercises     Row Name 04/26/18 1200             Subjective Comments    Subjective Comments Pt reports she has still been sick so has not peformed HEP as often. Believes that she is starting to lose some weight.  -RB         Subjective Pain    Able to rate subjective pain? yes  -RB      Pre-Treatment Pain Level 0  -RB      Post-Treatment Pain Level 4  -RB         Exercise 1    Exercise Name 1 Ther ex in clinic as per flow sheet. Progressed resistance/repetitions as tolerated. Reviewed HEP additions.  -RB      Time 1 30  -RB        User Key  (r) = Recorded By, (t) = Taken By, (c) = Cosigned By    Initials Name Provider Type    RB Kaylan Sanabria PT Physical Therapist                               PT OP Goals     Row Name 04/26/18 1253          Time Calculation    PT Goal Re-Cert Due Date 06/06/18  -RB       User Key  (r) = Recorded By, (t) = Taken By, (c) = Cosigned By    Initials Name Provider Type    DEBORAH Sanabria PT Physical Therapist          Therapy Education  Education Details: updated HEP as outlined in chart and provided new TB  Given: HEP  Program: Progressed  How Provided: Verbal, Demonstration, Written  Provided to: Patient  Level of Understanding: Teach back education performed              Time Calculation:   Start Time: 1100  Total Timed Code Minutes- PT: 30 minute(s)    Therapy Charges for Today     Code Description Service Date Service Provider Modifiers Qty    61596083632 HC PT THER PROC EA 15 MIN 4/26/2018 Kaylan Sanabria, PT GP 2                    Kaylan Sanabria, PT  4/26/2018

## 2018-05-01 ENCOUNTER — APPOINTMENT (OUTPATIENT)
Dept: PHYSICAL THERAPY | Facility: HOSPITAL | Age: 53
End: 2018-05-01

## 2018-05-17 DIAGNOSIS — F32.A DEPRESSION, UNSPECIFIED DEPRESSION TYPE: ICD-10-CM

## 2018-05-17 DIAGNOSIS — F41.9 ANXIETY: ICD-10-CM

## 2018-05-21 DIAGNOSIS — I10 ESSENTIAL HYPERTENSION: ICD-10-CM

## 2018-05-21 RX ORDER — LISINOPRIL 20 MG/1
20 TABLET ORAL NIGHTLY
Qty: 30 TABLET | Refills: 1 | Status: SHIPPED | OUTPATIENT
Start: 2018-05-21 | End: 2018-07-16 | Stop reason: SDUPTHER

## 2018-06-04 ENCOUNTER — DOCUMENTATION (OUTPATIENT)
Dept: PHYSICAL THERAPY | Facility: HOSPITAL | Age: 53
End: 2018-06-04

## 2018-06-04 DIAGNOSIS — G89.29 CHRONIC MIDLINE LOW BACK PAIN WITHOUT SCIATICA: Primary | ICD-10-CM

## 2018-06-04 DIAGNOSIS — M54.50 CHRONIC MIDLINE LOW BACK PAIN WITHOUT SCIATICA: Primary | ICD-10-CM

## 2018-06-04 NOTE — THERAPY DISCHARGE NOTE
Outpatient Physical Therapy Discharge Summary         Patient Name: Adrianne Lowe  : 1965  MRN: [id number]    Today's Date: 2018    Visit Dx:    ICD-10-CM ICD-9-CM   1. Chronic midline low back pain without sciatica M54.5 724.2    G89.29 338.29             PT OP Goals     Row Name 18 1000          PT Short Term Goals    STG Date to Achieve 18  -RB     STG 1 Pt to be compliant w/ initial HEP for strengthening and symptom mgmt  -RB     STG 1 Progress Met  -RB     STG 2 Pt to report pn at worst no greater than 7/10.  -RB     STG 2 Progress Met  -RB     STG 3 Pt to demo functional trunk mobility in all planes w/ minimal to no increase in pn.  -RB     STG 3 Progress Met  -RB        Long Term Goals    LTG Date to Achieve 18  -RB     LTG 1 Pt to be independent w/ long term HEP for strengthening and symptom mgmt  -RB     LTG 1 Progress Met  -RB     LTG 2 Pt to report pn w/ daily activity at worst 5/10.  -RB     LTG 2 Progress Not Met  -RB     LTG 3 Pt to improve B hip abd strength to at least 4/5.  -RB     LTG 3 Progress Met  -RB     LTG 4 Pt to improve B hip extn strength to at least 4/5.  -RB     LTG 4 Progress Not Met  -RB     LTG 5 Pt to improve Mod Oswestry score by at least 8 pts to reflect improved pn and function.  -RB     LTG 5 Progress Not Met  -RB       User Key  (r) = Recorded By, (t) = Taken By, (c) = Cosigned By    Initials Name Provider Type    RB Kaylan Sanabria, PT Physical Therapist          OP PT Discharge Summary  Date of Discharge: 18  Reason for Discharge: Independent  Outcomes Achieved: Patient able to partially acheive established goals  Discharge Destination: Home with home program  Discharge Instructions/Additional Comments: Pt met 5/8 goals and reported reduced pn, improved function over course of 8 PT visits. She was d/c to independent HEP.      Time Calculation:                    Kaylan Sanabria, PT  2018

## 2018-06-12 DIAGNOSIS — E78.5 HYPERLIPIDEMIA, UNSPECIFIED HYPERLIPIDEMIA TYPE: ICD-10-CM

## 2018-06-12 RX ORDER — SIMVASTATIN 20 MG
TABLET ORAL
Qty: 30 TABLET | Refills: 5 | Status: SHIPPED | OUTPATIENT
Start: 2018-06-12 | End: 2018-11-12 | Stop reason: SDUPTHER

## 2018-06-19 DIAGNOSIS — J44.9 CHRONIC OBSTRUCTIVE PULMONARY DISEASE, UNSPECIFIED COPD TYPE (HCC): ICD-10-CM

## 2018-06-19 DIAGNOSIS — J30.89 SEASONAL ALLERGIC RHINITIS DUE TO OTHER ALLERGIC TRIGGER, UNSPECIFIED CHRONICITY: ICD-10-CM

## 2018-06-19 RX ORDER — TIOTROPIUM BROMIDE 18 UG/1
CAPSULE ORAL; RESPIRATORY (INHALATION)
Qty: 30 CAPSULE | Refills: 1 | Status: SHIPPED | OUTPATIENT
Start: 2018-06-19 | End: 2018-09-13 | Stop reason: SDUPTHER

## 2018-06-19 RX ORDER — FLUTICASONE PROPIONATE 50 MCG
SPRAY, SUSPENSION (ML) NASAL
Qty: 16 G | Refills: 1 | Status: SHIPPED | OUTPATIENT
Start: 2018-06-19 | End: 2018-09-13 | Stop reason: SDUPTHER

## 2018-06-20 RX ORDER — GABAPENTIN 400 MG/1
CAPSULE ORAL
Qty: 90 CAPSULE | Refills: 2 | Status: SHIPPED | OUTPATIENT
Start: 2018-06-20 | End: 2018-09-13

## 2018-06-20 NOTE — TELEPHONE ENCOUNTER
Patient's pharmacy requesting refill of gabapentin.    Please advise.   -Pt last seen  2/26/2018  -Last rx  4/3/2018, #90 with 2 refills   -CSA  None on file  -U[facility]  None on file    -[facility]  None on file

## 2018-07-10 ENCOUNTER — OFFICE VISIT (OUTPATIENT)
Dept: INTERNAL MEDICINE | Facility: CLINIC | Age: 53
End: 2018-07-10

## 2018-07-10 VITALS
BODY MASS INDEX: 45.09 KG/M2 | SYSTOLIC BLOOD PRESSURE: 126 MMHG | TEMPERATURE: 97.5 F | WEIGHT: 238.8 LBS | HEART RATE: 100 BPM | RESPIRATION RATE: 22 BRPM | HEIGHT: 61 IN | DIASTOLIC BLOOD PRESSURE: 60 MMHG

## 2018-07-10 DIAGNOSIS — K21.9 GASTROESOPHAGEAL REFLUX DISEASE WITHOUT ESOPHAGITIS: ICD-10-CM

## 2018-07-10 DIAGNOSIS — Z00.00 ANNUAL PHYSICAL EXAM: Primary | ICD-10-CM

## 2018-07-10 DIAGNOSIS — I10 ESSENTIAL HYPERTENSION: ICD-10-CM

## 2018-07-10 DIAGNOSIS — K59.00 CONSTIPATION, UNSPECIFIED CONSTIPATION TYPE: ICD-10-CM

## 2018-07-10 DIAGNOSIS — R22.9 LOCALIZED SOFT TISSUE SWELLING: ICD-10-CM

## 2018-07-10 PROCEDURE — 99396 PREV VISIT EST AGE 40-64: CPT | Performed by: NURSE PRACTITIONER

## 2018-07-10 RX ORDER — LANSOPRAZOLE 30 MG/1
30 CAPSULE, DELAYED RELEASE ORAL DAILY
Qty: 30 CAPSULE | Refills: 0 | Status: SHIPPED | OUTPATIENT
Start: 2018-07-10 | End: 2018-08-16 | Stop reason: SDUPTHER

## 2018-07-10 RX ORDER — LANOLIN ALCOHOL/MO/W.PET/CERES
5000 CREAM (GRAM) TOPICAL DAILY PRN
COMMUNITY

## 2018-07-10 RX ORDER — GREEN TEA/HOODIA GORDONII 315-12.5MG
1 CAPSULE ORAL DAILY PRN
COMMUNITY

## 2018-07-10 NOTE — PROGRESS NOTES
Chief Complaint   Patient presents with   • Annual Exam     physical, non-fasting    • Cyst     found a knot on her back, noticed about a month ago        Subjective     History of Present Illness     Well Adult Physical: Patient here for a comprehensive physical exam.The patient reports problems - see below  Do you take any herbs or supplements that were not prescribed by a doctor? no Are you taking calcium supplements? no Are you taking aspirin daily? no    Down 7# no bp checks at home    Her back pain is worsening and is her typical pain but worse.     She has knot in back area has been there for sometime unsure how long.no treatment.      She is having lots of gerd symptom at times doubles dose aof prevacid and helps.     Not using dulera inhaler very much does not need it.     Uses bentyl prn for abd cramps     She has bm q3-4 days      Current Outpatient Prescriptions:   •  B Complex Vitamins (VITAMIN B COMPLEX PO), Take 1 tablet by mouth Daily As Needed., Disp: , Rfl:   •  Blood Pressure Monitoring (BLOOD PRESSURE CUFF) misc, , Disp: , Rfl:   •  cetirizine (zyrTEC) 10 MG tablet, Take 1 tablet by mouth Daily., Disp: 30 tablet, Rfl: 5  •  dicyclomine (BENTYL) 20 MG tablet, Take one tablet by mouth twice a day if needed., Disp: 60 tablet, Rfl: 2  •  DULERA 200-5 MCG/ACT inhaler, Inhale 2 puffs 2 (Two) Times a Day., Disp: , Rfl: 0  •  gabapentin (NEURONTIN) 400 MG capsule, take 1 capsule by mouth three times a day, Disp: 90 capsule, Rfl: 2  •  ketoconazole (NIZORAL) 2 % shampoo, Apply  topically 2 (two) times a week. Apply to affected area, Disp: 120 mL, Rfl: 1  •  lansoprazole (PREVACID) 30 MG capsule, Take 1 capsule by mouth Daily., Disp: 30 capsule, Rfl: 0  •  latanoprost (XALATAN) 0.005 % ophthalmic solution, Administer 1 drop to both eyes every night., Disp: , Rfl:   •  Multiple Vitamins-Minerals (HAIR/SKIN/NAILS/BIOTIN) tablet, Take 1 tablet by mouth Daily As Needed., Disp: , Rfl:   •  sertraline (ZOLOFT)  50 MG tablet, take 1 tablet by mouth once daily, Disp: 30 tablet, Rfl: 5  •  simvastatin (ZOCOR) 20 MG tablet, take 1 tablet by mouth at bedtime, Disp: 30 tablet, Rfl: 5  •  SPIRIVA HANDIHALER 18 MCG per inhalation capsule, inhale contents of 1 capsule by mouth once daily, Disp: 30 capsule, Rfl: 1  •  vitamin B-12 (CYANOCOBALAMIN) 1000 MCG tablet, Take 5,000 mcg by mouth Daily As Needed., Disp: , Rfl:   •  fluticasone (FLONASE) 50 MCG/ACT nasal spray, instill 1 spray into each nostril once daily, Disp: 16 g, Rfl: 1  •  Fluticasone Furoate-Vilanterol 100-25 MCG/INH aerosol powder , Inhale 1 puff Daily., Disp: 1 each, Rfl: 11  •  linaclotide (LINZESS) 72 MCG capsule capsule, Take 1 capsule by mouth Every Morning Before Breakfast., Disp: 30 capsule, Rfl: 0  •  lisinopril (PRINIVIL,ZESTRIL) 20 MG tablet, take 1 tablet by mouth EVERY NIGHT, Disp: 30 tablet, Rfl: 1  •  meloxicam (MOBIC) 7.5 MG tablet, take 1 tablet by mouth once daily if needed for pain, Disp: 30 tablet, Rfl: 5          The following portions of the patient's history were reviewed and updated as appropriate: allergies, current medications, past family history, past medical history, past social history, past surgical history and problem list.    Review of Systems   Constitutional: Negative for chills, fatigue, fever, unexpected weight gain and unexpected weight loss.        No night sweats.  No generalized pain.   HENT: Negative for congestion, ear pain, hearing loss, nosebleeds, postnasal drip, rhinorrhea, sinus pressure, sneezing, sore throat, tinnitus and voice change.         Denies snoring.   Eyes: Negative for photophobia, pain, discharge, redness, itching and visual disturbance.   Respiratory: Negative for cough, chest tightness, shortness of breath, wheezing and stridor.         No orthopnea, dyspnea on exertion, or PND.  No chest congestion.   No  hemoptysis.   Cardiovascular: Negative for chest pain, palpitations and leg swelling.        No  claudication or syncope.   Gastrointestinal: Positive for GERD. Negative for abdominal pain, blood in stool, constipation, diarrhea, nausea, rectal pain and vomiting.        No hematemesis.  No heartburn, dysphagia or odynophagia.  No belching or bloating.  No melena.   Endocrine: Negative for cold intolerance, heat intolerance, polydipsia, polyphagia, polyuria and breast discharge.        No hair loss or dry skin.  No generalized weakness.  No hot flashes.   Genitourinary: Negative for urinary incontinence, difficulty urinating, dyspareunia, dysuria, flank pain, frequency, hematuria, menstrual problem, pelvic pain, urgency, vaginal bleeding and vaginal discharge.        No nocturia or incomplete emptying.   Musculoskeletal: Positive for back pain. Negative for arthralgias, gait problem, joint swelling, myalgias, neck pain and neck stiffness.        Knot on right mid back   Skin: Negative for rash.   Neurological: Negative for dizziness, tremors, syncope, speech difficulty, weakness, light-headedness, numbness, memory problem and confusion.        No tingling.   Hematological: Negative for adenopathy. Does not bruise/bleed easily.   Psychiatric/Behavioral: Negative for decreased concentration, sleep disturbance, suicidal ideas and depressed mood. The patient is not nervous/anxious.         No confusion.   All other systems reviewed and are negative.      Objective   Physical Exam   Constitutional: She appears well-developed and well-nourished.   HENT:   Head: Normocephalic and atraumatic.   Right Ear: Tympanic membrane, external ear and ear canal normal.   Left Ear: Tympanic membrane, external ear and ear canal normal.   Mouth/Throat: Oropharynx is clear and moist.   Eyes: Pupils are equal, round, and reactive to light. Conjunctivae and EOM are normal.   Neck: Normal range of motion. Neck supple. Carotid bruit is not present. No thyromegaly present.   Cardiovascular: Normal rate, regular rhythm and intact distal  pulses.  Exam reveals no gallop and no friction rub.    No murmur heard.  Pulmonary/Chest: Effort normal and breath sounds normal. Right breast exhibits no inverted nipple, no mass, no nipple discharge, no skin change and no tenderness. Left breast exhibits no inverted nipple, no mass, no nipple discharge, no skin change and no tenderness.   Abdominal: Soft. Bowel sounds are normal. She exhibits no distension, no abdominal bruit and no mass. There is no hepatosplenomegaly. There is no tenderness.   Genitourinary:   Genitourinary Comments: Cervix is without lesions or erythema.   Musculoskeletal: Normal range of motion. She exhibits no edema or tenderness.   Lymphadenopathy:     She has no cervical adenopathy.     She has no axillary adenopathy.        Right: No inguinal and no supraclavicular adenopathy present.        Left: No inguinal and no supraclavicular adenopathy present.   Neurological: She is alert. She has normal strength and normal reflexes. No cranial nerve deficit. She exhibits normal muscle tone. Coordination and gait normal.   Skin: Capillary refill takes 2 to 3 seconds. No rash noted.   No atypical skin lesions. Right low thoracic midback painful knot 2 in palapble.   Psychiatric: She has a normal mood and affect. Her behavior is normal.   Nursing note and vitals reviewed.            Results for orders placed or performed in visit on 04/20/18   POCT rapid strep A   Result Value Ref Range    Rapid Strep A Screen Negative Negative, VALID, INVALID, Not Performed    Internal Control Passed Passed    Lot Number [id number]     Expiration Date 12/31/2019         Assessment/Plan   Adrianne was seen today for annual exam and cyst.    Diagnoses and all orders for this visit:    Annual physical exam  -     CBC & Differential; Future  -     Comprehensive Metabolic Panel; Future  -     POC Urinalysis Dipstick, Automated; Future  -     TSH; Future  -     Lipid Panel; Future    Essential  hypertension    Gastroesophageal reflux disease without esophagitis  -     lansoprazole (PREVACID) 30 MG capsule; Take 1 capsule by mouth Daily.    Constipation, unspecified constipation type  -     linaclotide (LINZESS) 72 MCG capsule capsule; Take 1 capsule by mouth Every Morning Before Breakfast.    Localized soft tissue swelling  -     US Soft Tissue; Future      Patient education regarding the importance of avoidance of texting while driving and the need for wearing seatbelt.  Use of sunscreen, use of helmets while on bikes.  The appropriate amounts of sleep and H20 consumption per day was reviewed with pt.  The need for healthy well-balanced diet based off the food guide pyramid and avoidance of skipping meals along with following a NCS diet with appropriate amounts of fats, protein, and carbohydrate and low sodium diet. Encouraging 30minutes of cardio 5d weekly and muscle strengthening 3x weekly.         Per pt colon 1/2016 unsure who or results  Sister dx 40 colon cnacer D at 42 yo  Mamm utd  Pap 7/17 pap nml reviewed today  Due for fasting blood work never returned for this since 2018 no labs since 2017 have instructed needs labs prior to any further refills pt v/u.   Has PMR at [facility] [address]-hydrocod / acetaminaphoen 5/325 1 tid dr johnson  Return in about 3 months (around 10/10/2018) for Next scheduled follow up, Recheck.  RTC/call  If symptoms worsen  Meds MOA and SE's reviewed and pt v/u

## 2018-07-11 ENCOUNTER — LAB (OUTPATIENT)
Dept: INTERNAL MEDICINE | Facility: CLINIC | Age: 53
End: 2018-07-11

## 2018-07-11 DIAGNOSIS — Z00.00 ANNUAL PHYSICAL EXAM: ICD-10-CM

## 2018-07-11 LAB
ALBUMIN SERPL-MCNC: 4.51 G/DL (ref 3.2–4.8)
ALBUMIN/GLOB SERPL: 1.9 G/DL (ref 1.5–2.5)
ALP SERPL-CCNC: 89 U/L (ref 25–100)
ALT SERPL W P-5'-P-CCNC: 51 U/L (ref 7–40)
ANION GAP SERPL CALCULATED.3IONS-SCNC: 7 MMOL/L (ref 3–11)
ARTICHOKE IGE QN: 92 MG/DL (ref 0–130)
AST SERPL-CCNC: 37 U/L (ref 0–33)
BILIRUB BLD-MCNC: NEGATIVE MG/DL
BILIRUB SERPL-MCNC: 0.4 MG/DL (ref 0.3–1.2)
BUN BLD-MCNC: 12 MG/DL (ref 9–23)
BUN/CREAT SERPL: 19 (ref 7–25)
CALCIUM SPEC-SCNC: 9.3 MG/DL (ref 8.7–10.4)
CHLORIDE SERPL-SCNC: 105 MMOL/L (ref 99–109)
CHOLEST SERPL-MCNC: 155 MG/DL (ref 0–200)
CLARITY, POC: CLEAR
CO2 SERPL-SCNC: 28 MMOL/L (ref 20–31)
COLOR UR: YELLOW
CREAT BLD-MCNC: 0.63 MG/DL (ref 0.6–1.3)
EXPIRATION DATE: NORMAL
GFR SERPL CREATININE-BSD FRML MDRD: 99 ML/MIN/1.73
GLOBULIN UR ELPH-MCNC: 2.4 GM/DL
GLUCOSE BLD-MCNC: 110 MG/DL (ref 70–100)
GLUCOSE UR STRIP-MCNC: NEGATIVE MG/DL
HDLC SERPL-MCNC: 42 MG/DL (ref 40–60)
KETONES UR QL: NEGATIVE
LEUKOCYTE EST, POC: NEGATIVE
Lab: NORMAL
NITRITE UR-MCNC: NEGATIVE MG/ML
PH UR: 5 [PH] (ref 5–8)
POTASSIUM BLD-SCNC: 4.4 MMOL/L (ref 3.5–5.5)
PROT SERPL-MCNC: 6.9 G/DL (ref 5.7–8.2)
PROT UR STRIP-MCNC: NEGATIVE MG/DL
RBC # UR STRIP: NEGATIVE /UL
SODIUM BLD-SCNC: 140 MMOL/L (ref 132–146)
SP GR UR: 1.02 (ref 1–1.03)
TRIGL SERPL-MCNC: 169 MG/DL (ref 0–150)
TSH SERPL DL<=0.05 MIU/L-ACNC: 4.38 MIU/ML (ref 0.35–5.35)
UROBILINOGEN UR QL: NORMAL

## 2018-07-11 PROCEDURE — 84443 ASSAY THYROID STIM HORMONE: CPT | Performed by: NURSE PRACTITIONER

## 2018-07-11 PROCEDURE — 80061 LIPID PANEL: CPT | Performed by: NURSE PRACTITIONER

## 2018-07-11 PROCEDURE — 81003 URINALYSIS AUTO W/O SCOPE: CPT | Performed by: NURSE PRACTITIONER

## 2018-07-11 PROCEDURE — 80053 COMPREHEN METABOLIC PANEL: CPT | Performed by: NURSE PRACTITIONER

## 2018-07-13 ENCOUNTER — TELEPHONE (OUTPATIENT)
Dept: INTERNAL MEDICINE | Facility: CLINIC | Age: 53
End: 2018-07-13

## 2018-07-13 NOTE — TELEPHONE ENCOUNTER
7-13-18  Vinny from lab called they canceled the CBC due to not being able to find the EDTA tube in the lab.  If you still want the CBC on patient she will have to be called back for a redraw. Miguel

## 2018-07-16 ENCOUNTER — LAB (OUTPATIENT)
Dept: INTERNAL MEDICINE | Facility: CLINIC | Age: 53
End: 2018-07-16

## 2018-07-16 DIAGNOSIS — Z00.00 ANNUAL PHYSICAL EXAM: Primary | ICD-10-CM

## 2018-07-16 DIAGNOSIS — I10 ESSENTIAL HYPERTENSION: ICD-10-CM

## 2018-07-16 LAB
BASOPHILS # BLD AUTO: 0.03 10*3/MM3 (ref 0–0.2)
BASOPHILS NFR BLD AUTO: 0.4 % (ref 0–1)
DEPRECATED RDW RBC AUTO: 43.2 FL (ref 37–54)
EOSINOPHIL # BLD AUTO: 0.12 10*3/MM3 (ref 0–0.3)
EOSINOPHIL NFR BLD AUTO: 1.6 % (ref 0–3)
ERYTHROCYTE [DISTWIDTH] IN BLOOD BY AUTOMATED COUNT: 14 % (ref 11.3–14.5)
HCT VFR BLD AUTO: 43.1 % (ref 34.5–44)
HGB BLD-MCNC: 13.8 G/DL (ref 11.5–15.5)
IMM GRANULOCYTES # BLD: 0.01 10*3/MM3 (ref 0–0.03)
IMM GRANULOCYTES NFR BLD: 0.1 % (ref 0–0.6)
LYMPHOCYTES # BLD AUTO: 2.98 10*3/MM3 (ref 0.6–4.8)
LYMPHOCYTES NFR BLD AUTO: 40.3 % (ref 24–44)
MCH RBC QN AUTO: 27 PG (ref 27–31)
MCHC RBC AUTO-ENTMCNC: 32 G/DL (ref 32–36)
MCV RBC AUTO: 84.2 FL (ref 80–99)
MONOCYTES # BLD AUTO: 0.69 10*3/MM3 (ref 0–1)
MONOCYTES NFR BLD AUTO: 9.3 % (ref 0–12)
NEUTROPHILS # BLD AUTO: 3.58 10*3/MM3 (ref 1.5–8.3)
NEUTROPHILS NFR BLD AUTO: 48.4 % (ref 41–71)
PLATELET # BLD AUTO: 164 10*3/MM3 (ref 150–450)
RBC # BLD AUTO: 5.12 10*6/MM3 (ref 3.89–5.14)
WBC NRBC COR # BLD: 7.4 10*3/MM3 (ref 3.5–10.8)

## 2018-07-16 PROCEDURE — 85007 BL SMEAR W/DIFF WBC COUNT: CPT | Performed by: NURSE PRACTITIONER

## 2018-07-16 PROCEDURE — 36415 COLL VENOUS BLD VENIPUNCTURE: CPT | Performed by: NURSE PRACTITIONER

## 2018-07-16 PROCEDURE — 85025 COMPLETE CBC W/AUTO DIFF WBC: CPT | Performed by: NURSE PRACTITIONER

## 2018-07-16 RX ORDER — LISINOPRIL 20 MG/1
20 TABLET ORAL NIGHTLY
Qty: 30 TABLET | Refills: 1 | Status: SHIPPED | OUTPATIENT
Start: 2018-07-16 | End: 2018-09-13 | Stop reason: SDUPTHER

## 2018-07-24 ENCOUNTER — TELEPHONE (OUTPATIENT)
Dept: INTERNAL MEDICINE | Facility: CLINIC | Age: 53
End: 2018-07-24

## 2018-07-24 NOTE — TELEPHONE ENCOUNTER
Patient had it done at [facility].      Contacted [facility] and they are faxing over results.

## 2018-07-27 PROBLEM — K63.5 POLYP OF SIGMOID COLON: Status: ACTIVE | Noted: 2018-07-27

## 2018-08-01 DIAGNOSIS — R22.9 LOCALIZED SKIN MASS, LUMP, OR SWELLING: Primary | ICD-10-CM

## 2018-08-07 ENCOUNTER — HOSPITAL ENCOUNTER (OUTPATIENT)
Dept: ULTRASOUND IMAGING | Facility: HOSPITAL | Age: 53
Discharge: HOME | End: 2018-08-07
Admitting: NURSE PRACTITIONER

## 2018-08-07 DIAGNOSIS — R22.9 LOCALIZED SKIN MASS, LUMP, OR SWELLING: ICD-10-CM

## 2018-08-07 PROCEDURE — 76705 ECHO EXAM OF ABDOMEN: CPT

## 2018-08-09 ENCOUNTER — TELEPHONE (OUTPATIENT)
Dept: INTERNAL MEDICINE | Facility: CLINIC | Age: 53
End: 2018-08-09

## 2018-08-09 NOTE — TELEPHONE ENCOUNTER
----- Message from [place] sent at 8/3/2018  9:06 AM EDT -----  Concerning US soft tissue order, was US Abdomen order instead? Can US Soft tissue order be cancelled? They said they can not schedule soft tissue US, it has to have order for more specific body part

## 2018-08-16 DIAGNOSIS — K59.00 CONSTIPATION, UNSPECIFIED CONSTIPATION TYPE: ICD-10-CM

## 2018-08-16 DIAGNOSIS — K21.9 GASTROESOPHAGEAL REFLUX DISEASE WITHOUT ESOPHAGITIS: ICD-10-CM

## 2018-08-17 RX ORDER — LINACLOTIDE 72 UG/1
CAPSULE, GELATIN COATED ORAL
Qty: 30 CAPSULE | Refills: 0 | Status: SHIPPED | OUTPATIENT
Start: 2018-08-17 | End: 2019-02-27 | Stop reason: SDUPTHER

## 2018-08-17 RX ORDER — LANSOPRAZOLE 30 MG/1
30 CAPSULE, DELAYED RELEASE ORAL DAILY
Qty: 30 CAPSULE | Refills: 0 | Status: SHIPPED | OUTPATIENT
Start: 2018-08-17 | End: 2018-12-11 | Stop reason: SDUPTHER

## 2018-08-21 RX ORDER — MOMETASONE FUROATE AND FORMOTEROL FUMARATE DIHYDRATE 200; 5 UG/1; UG/1
AEROSOL RESPIRATORY (INHALATION)
Qty: 13 G | Refills: 5 | Status: SHIPPED | OUTPATIENT
Start: 2018-08-21 | End: 2019-03-11 | Stop reason: SDUPTHER

## 2018-09-11 RX ORDER — HYDROXYZINE HYDROCHLORIDE 25 MG/1
TABLET, FILM COATED ORAL
Qty: 30 TABLET | Refills: 2 | Status: SHIPPED | OUTPATIENT
Start: 2018-09-11 | End: 2018-11-12 | Stop reason: SDUPTHER

## 2018-09-11 NOTE — TELEPHONE ENCOUNTER
rcvd request for refill on hydroxyzine 25mg tablets. Previously filled by [facility]. Per Yin gillis to refill as before. Rx sent to pharmacy electronically.

## 2018-09-12 ENCOUNTER — TELEPHONE (OUTPATIENT)
Dept: INTERNAL MEDICINE | Facility: CLINIC | Age: 53
End: 2018-09-12

## 2018-09-12 NOTE — TELEPHONE ENCOUNTER
07/24/18 original Rx date with refills-Juan Friend was ordering physician on the Gabapentin 600mg TID. Pt called in for refill. Pharmacist Hawa noticed that Pt had two different physicians orders with different doses. Notified our office in case pt called to get more refills that she was getting them from another MD not our office. I verbalized understanding, thanked pharmacist for info, and we ended the call.

## 2018-09-12 NOTE — TELEPHONE ENCOUNTER
----- Message from Christina Riggins sent at 9/12/2018 12:55 PM EDT -----  KENZIE LLAMAS [id number]-[phone]    THEY GOT AN RX FOR GABAPENTIN FROM JOHANNA-SHE IS ALREADY TAKING GABAPENTIN 600 MG 3X DAY FORM ANOTHER DR-SHE WILL PUT OUR RX ON HOLD UNTIL SHE HEARS BACK

## 2018-09-12 NOTE — TELEPHONE ENCOUNTER
Please see who is ordering the medication for her.  Please call the patient to make sure we are since someone else's wearing her medication that I will let them continue ordering her medication and please cancel our order/rx at the pharmacy.

## 2018-09-13 ENCOUNTER — TELEPHONE (OUTPATIENT)
Dept: INTERNAL MEDICINE | Facility: CLINIC | Age: 53
End: 2018-09-13

## 2018-09-13 DIAGNOSIS — J44.9 CHRONIC OBSTRUCTIVE PULMONARY DISEASE, UNSPECIFIED COPD TYPE (HCC): ICD-10-CM

## 2018-09-13 DIAGNOSIS — I10 ESSENTIAL HYPERTENSION: ICD-10-CM

## 2018-09-13 DIAGNOSIS — J30.89 SEASONAL ALLERGIC RHINITIS DUE TO OTHER ALLERGIC TRIGGER, UNSPECIFIED CHRONICITY: ICD-10-CM

## 2018-09-13 DIAGNOSIS — F22 DELUSIONAL DISORDER (HCC): ICD-10-CM

## 2018-09-13 NOTE — TELEPHONE ENCOUNTER
Patient can obtain her gabapentin from Dr. Friend.  Prescription DC'd from our office.  Jose please.

## 2018-09-14 RX ORDER — FLUTICASONE PROPIONATE 50 MCG
SPRAY, SUSPENSION (ML) NASAL
Qty: 16 G | Refills: 1 | Status: SHIPPED | OUTPATIENT
Start: 2018-09-14 | End: 2018-11-12 | Stop reason: SDUPTHER

## 2018-09-14 RX ORDER — LISINOPRIL 20 MG/1
TABLET ORAL
Qty: 30 TABLET | Refills: 1 | Status: SHIPPED | OUTPATIENT
Start: 2018-09-14 | End: 2018-11-12 | Stop reason: SDUPTHER

## 2018-09-14 RX ORDER — TIOTROPIUM BROMIDE 18 UG/1
CAPSULE ORAL; RESPIRATORY (INHALATION)
Qty: 30 CAPSULE | Refills: 1 | Status: SHIPPED | OUTPATIENT
Start: 2018-09-14 | End: 2018-11-06 | Stop reason: SDUPTHER

## 2018-10-15 ENCOUNTER — OFFICE VISIT (OUTPATIENT)
Dept: INTERNAL MEDICINE | Facility: CLINIC | Age: 53
End: 2018-10-15

## 2018-10-15 VITALS
DIASTOLIC BLOOD PRESSURE: 80 MMHG | RESPIRATION RATE: 22 BRPM | WEIGHT: 241.4 LBS | TEMPERATURE: 97.4 F | SYSTOLIC BLOOD PRESSURE: 134 MMHG | HEART RATE: 90 BPM | BODY MASS INDEX: 46.37 KG/M2

## 2018-10-15 DIAGNOSIS — H60.501 ACUTE OTITIS EXTERNA OF RIGHT EAR, UNSPECIFIED TYPE: ICD-10-CM

## 2018-10-15 DIAGNOSIS — K64.8 INTERNAL HEMORRHOID: Primary | ICD-10-CM

## 2018-10-15 DIAGNOSIS — J44.9 CHRONIC OBSTRUCTIVE PULMONARY DISEASE, UNSPECIFIED COPD TYPE (HCC): ICD-10-CM

## 2018-10-15 DIAGNOSIS — K21.9 GASTROESOPHAGEAL REFLUX DISEASE WITHOUT ESOPHAGITIS: ICD-10-CM

## 2018-10-15 DIAGNOSIS — R22.9 LOCALIZED SKIN MASS, LUMP, OR SWELLING: ICD-10-CM

## 2018-10-15 DIAGNOSIS — F32.A DEPRESSION, UNSPECIFIED DEPRESSION TYPE: ICD-10-CM

## 2018-10-15 DIAGNOSIS — M54.50 CHRONIC LOW BACK PAIN WITHOUT SCIATICA, UNSPECIFIED BACK PAIN LATERALITY: ICD-10-CM

## 2018-10-15 DIAGNOSIS — I10 ESSENTIAL HYPERTENSION: ICD-10-CM

## 2018-10-15 DIAGNOSIS — G89.29 CHRONIC LOW BACK PAIN WITHOUT SCIATICA, UNSPECIFIED BACK PAIN LATERALITY: ICD-10-CM

## 2018-10-15 DIAGNOSIS — E78.5 HYPERLIPIDEMIA, UNSPECIFIED HYPERLIPIDEMIA TYPE: ICD-10-CM

## 2018-10-15 DIAGNOSIS — F41.9 ANXIETY: ICD-10-CM

## 2018-10-15 DIAGNOSIS — K59.09 OTHER CONSTIPATION: ICD-10-CM

## 2018-10-15 DIAGNOSIS — G47.00 INSOMNIA, UNSPECIFIED TYPE: ICD-10-CM

## 2018-10-15 LAB
ALBUMIN SERPL-MCNC: 4.67 G/DL (ref 3.2–4.8)
ALBUMIN/GLOB SERPL: 2 G/DL (ref 1.5–2.5)
ALP SERPL-CCNC: 98 U/L (ref 25–100)
ALT SERPL W P-5'-P-CCNC: 40 U/L (ref 7–40)
ANION GAP SERPL CALCULATED.3IONS-SCNC: 7 MMOL/L (ref 3–11)
ARTICHOKE IGE QN: 96 MG/DL (ref 0–130)
AST SERPL-CCNC: 24 U/L (ref 0–33)
BASOPHILS # BLD AUTO: 0.02 10*3/MM3 (ref 0–0.2)
BASOPHILS NFR BLD AUTO: 0.3 % (ref 0–1)
BILIRUB BLD-MCNC: NEGATIVE MG/DL
BILIRUB SERPL-MCNC: 0.3 MG/DL (ref 0.3–1.2)
BUN BLD-MCNC: 12 MG/DL (ref 9–23)
BUN/CREAT SERPL: 21.8 (ref 7–25)
CALCIUM SPEC-SCNC: 9.5 MG/DL (ref 8.7–10.4)
CHLORIDE SERPL-SCNC: 99 MMOL/L (ref 99–109)
CHOLEST SERPL-MCNC: 169 MG/DL (ref 0–200)
CLARITY, POC: CLEAR
CO2 SERPL-SCNC: 31 MMOL/L (ref 20–31)
COLOR UR: YELLOW
CREAT BLD-MCNC: 0.55 MG/DL (ref 0.6–1.3)
DEPRECATED RDW RBC AUTO: 43 FL (ref 37–54)
EOSINOPHIL # BLD AUTO: 0.15 10*3/MM3 (ref 0–0.3)
EOSINOPHIL NFR BLD AUTO: 1.9 % (ref 0–3)
ERYTHROCYTE [DISTWIDTH] IN BLOOD BY AUTOMATED COUNT: 14.1 % (ref 11.3–14.5)
EXPIRATION DATE: NORMAL
GFR SERPL CREATININE-BSD FRML MDRD: 116 ML/MIN/1.73
GLOBULIN UR ELPH-MCNC: 2.3 GM/DL
GLUCOSE BLD-MCNC: 93 MG/DL (ref 70–100)
GLUCOSE UR STRIP-MCNC: NEGATIVE MG/DL
HCT VFR BLD AUTO: 42 % (ref 34.5–44)
HDLC SERPL-MCNC: 52 MG/DL (ref 40–60)
HGB BLD-MCNC: 13.4 G/DL (ref 11.5–15.5)
IMM GRANULOCYTES # BLD: 0.03 10*3/MM3 (ref 0–0.03)
IMM GRANULOCYTES NFR BLD: 0.4 % (ref 0–0.6)
KETONES UR QL: NEGATIVE
LEUKOCYTE EST, POC: NEGATIVE
LYMPHOCYTES # BLD AUTO: 3.04 10*3/MM3 (ref 0.6–4.8)
LYMPHOCYTES NFR BLD AUTO: 38.6 % (ref 24–44)
Lab: NORMAL
MCH RBC QN AUTO: 26.9 PG (ref 27–31)
MCHC RBC AUTO-ENTMCNC: 31.9 G/DL (ref 32–36)
MCV RBC AUTO: 84.2 FL (ref 80–99)
MONOCYTES # BLD AUTO: 0.46 10*3/MM3 (ref 0–1)
MONOCYTES NFR BLD AUTO: 5.8 % (ref 0–12)
NEUTROPHILS # BLD AUTO: 4.2 10*3/MM3 (ref 1.5–8.3)
NEUTROPHILS NFR BLD AUTO: 53.4 % (ref 41–71)
NITRITE UR-MCNC: NEGATIVE MG/ML
PH UR: 5 [PH] (ref 5–8)
PLATELET # BLD AUTO: 180 10*3/MM3 (ref 150–450)
PMV BLD AUTO: 14.6 FL (ref 6–12)
POTASSIUM BLD-SCNC: 4.1 MMOL/L (ref 3.5–5.5)
PROT SERPL-MCNC: 7 G/DL (ref 5.7–8.2)
PROT UR STRIP-MCNC: NEGATIVE MG/DL
RBC # BLD AUTO: 4.99 10*6/MM3 (ref 3.89–5.14)
RBC # UR STRIP: NEGATIVE /UL
SODIUM BLD-SCNC: 137 MMOL/L (ref 132–146)
SP GR UR: 1 (ref 1–1.03)
TRIGL SERPL-MCNC: 151 MG/DL (ref 0–150)
TSH SERPL DL<=0.05 MIU/L-ACNC: 5.16 MIU/ML (ref 0.35–5.35)
UROBILINOGEN UR QL: NORMAL
WBC NRBC COR # BLD: 7.87 10*3/MM3 (ref 3.5–10.8)

## 2018-10-15 PROCEDURE — 99214 OFFICE O/P EST MOD 30 MIN: CPT | Performed by: NURSE PRACTITIONER

## 2018-10-15 PROCEDURE — 80061 LIPID PANEL: CPT | Performed by: NURSE PRACTITIONER

## 2018-10-15 PROCEDURE — 80053 COMPREHEN METABOLIC PANEL: CPT | Performed by: NURSE PRACTITIONER

## 2018-10-15 PROCEDURE — 84443 ASSAY THYROID STIM HORMONE: CPT | Performed by: NURSE PRACTITIONER

## 2018-10-15 PROCEDURE — 85025 COMPLETE CBC W/AUTO DIFF WBC: CPT | Performed by: NURSE PRACTITIONER

## 2018-10-15 RX ORDER — SERTRALINE HYDROCHLORIDE 100 MG/1
100 TABLET, FILM COATED ORAL DAILY
Qty: 30 TABLET | Refills: 2 | Status: SHIPPED | OUTPATIENT
Start: 2018-10-15 | End: 2020-01-11 | Stop reason: SDUPTHER

## 2018-10-15 RX ORDER — GABAPENTIN 400 MG/1
400 CAPSULE ORAL 3 TIMES DAILY
Refills: 0 | COMMUNITY
Start: 2018-10-12 | End: 2018-12-17 | Stop reason: SDUPTHER

## 2018-10-15 RX ORDER — MELOXICAM 7.5 MG/1
7.5 TABLET ORAL DAILY
Qty: 30 TABLET | Refills: 2 | Status: SHIPPED | OUTPATIENT
Start: 2018-10-15 | End: 2018-12-11 | Stop reason: SDUPTHER

## 2018-10-15 RX ORDER — TRAZODONE HYDROCHLORIDE 50 MG/1
50 TABLET ORAL NIGHTLY
Qty: 30 TABLET | Refills: 2 | Status: SHIPPED | OUTPATIENT
Start: 2018-10-15 | End: 2019-01-14 | Stop reason: SDUPTHER

## 2018-10-15 NOTE — PROGRESS NOTES
Chief Complaint   Patient presents with   • Follow-up     6 month follow up        Subjective     History of Present Illness     Patient is here for fasting six-month follow-up.  Hyperechoic finding area back US 8/18 thought to be not clinically cignificant however if continues would have repeaet US.  Pt states is painful. Wants further evaluated.       Patient is having difficulty with hemorrhoids and wants something done.  Has had in past bleeding a lot more and used otc prep H helpsp some.     Ears bothering her. Taking allegy meds helps nose but nto helping ears. Not taken nose spray fo her ears only if she is stuffy.     linzess helping constipation      Copd stable unless in hot weather    Still get gerd exac on prevacid    Insomnia no reason noted for symptoms    B ear pain R>L        The following portions of the patient's history were reviewed and updated as appropriate: allergies, current medications, past family history, past medical history, past social history, past surgical history and problem list.    Review of Systems   Constitutional: Negative for activity change, appetite change, chills, fatigue and fever.   HENT: Positive for ear pain. Negative for congestion, postnasal drip and sore throat.    Eyes: Negative for visual disturbance.   Respiratory: Negative for cough and shortness of breath.    Cardiovascular: Negative for chest pain, palpitations and leg swelling.   Gastrointestinal: Positive for GERD. Negative for abdominal pain, constipation, diarrhea and nausea.        Hemorrhoids   Musculoskeletal: Negative for arthralgias and myalgias.   Skin: Negative for rash.   Allergic/Immunologic: Negative for environmental allergies.   Neurological: Negative for dizziness.   Psychiatric/Behavioral: Positive for sleep disturbance.   All other systems reviewed and are negative.      Objective   Physical Exam   Constitutional: She is oriented to person, place, and time. She appears well-developed and  well-nourished.   HENT:   Head: Normocephalic and atraumatic.   Right Ear: External ear normal.   Left Ear: External ear normal.   Nose: Nose normal.   Mouth/Throat: Oropharynx is clear and moist.   Bilateral TMs mildly retracted clear postnasal drainage   Eyes: Conjunctivae are normal. Right eye exhibits no discharge. Left eye exhibits no discharge.   Neck: No thyromegaly present.   Cardiovascular: Normal rate, regular rhythm and intact distal pulses.    Pulmonary/Chest: Effort normal and breath sounds normal.   Abdominal: Soft. Bowel sounds are normal. She exhibits no distension. There is no tenderness.   Musculoskeletal: She exhibits no edema.   Lymphadenopathy:     She has no cervical adenopathy.   Neurological: She is alert and oriented to person, place, and time.   Skin: Skin is warm and dry. Capillary refill takes 2 to 3 seconds.   Psychiatric: She has a normal mood and affect. Her behavior is normal.   Nursing note and vitals reviewed.           Assessment/Plan   Adrianne was seen today for follow-up.    Diagnoses and all orders for this visit:    Internal hemorrhoid  -     Ambulatory Referral to Colorectal Surgery    Chronic low back pain without sciatica, unspecified back pain laterality  -     meloxicam (MOBIC) 7.5 MG tablet; Take 1 tablet by mouth Daily.  -     Ambulatory Referral to General Surgery    Essential hypertension  -     Comprehensive Metabolic Panel  -     POC Urinalysis Dipstick, Automated    Hyperlipidemia, unspecified hyperlipidemia type  -     Comprehensive Metabolic Panel  -     Lipid Panel    Other constipation    Chronic obstructive pulmonary disease, unspecified COPD type (CMS/HCC)    Gastroesophageal reflux disease without esophagitis  -     H. Pylori Antigen, Stool - Stool, Per Rectum  -     CBC & Differential  -     CBC Auto Differential    Insomnia, unspecified type  -     traZODone (DESYREL) 50 MG tablet; Take 1 tablet by mouth Every Night.    Anxiety  -     sertraline (ZOLOFT)  100 MG tablet; Take 1 tablet by mouth Daily.    Depression, unspecified depression type  -     sertraline (ZOLOFT) 100 MG tablet; Take 1 tablet by mouth Daily.  -     TSH    Localized skin mass, lump, or swelling  -     Ambulatory Referral to General Surgery    Acute otitis externa of right ear, unspecified type  -     neomycin-polymyxin-hydrocortisone (CORTISPORIN) 3.5-92876-4 otic solution; Administer 3 drops into both ears 3 (Three) Times a Day.      Colorectal consult for hemorrhoids/Take otc prep H   flonase diaily for ears no change will call can add medrol  Surgical consult for painful lump noted in back  Increase zoloft add trazodone  Per pt PMR relocated and does not know how to get on line. Wants increase on salazar--asked her to let PMR direct her pain management.   Cream and coffee today  Will monitor bp when out shopping if does not come to green zone/consistently stays in yellow or goes to red zone will call.       Return in about 3 months (around 1/15/2019) for Next scheduled follow up, fasting.  RTC/call  If symptoms worsen  Meds MOA and SE's reviewed and pt v/u

## 2018-10-15 NOTE — PATIENT INSTRUCTIONS
"Dash    DASH Eating Plan  DASH stands for \"Dietary Approaches to Stop Hypertension.\" The DASH eating plan is a healthy eating plan that has been shown to reduce high blood pressure (hypertension). It may also reduce your risk for type 2 diabetes, heart disease, and stroke. The DASH eating plan may also help with weight loss.  What are tips for following this plan?  General guidelines  · Avoid eating more than 2,300 mg (milligrams) of salt (sodium) a day. If you have hypertension, you may need to reduce your sodium intake to 1,500 mg a day.  · Limit alcohol intake to no more than 1 drink a day for nonpregnant women and 2 drinks a day for men. One drink equals 12 oz of beer, 5 oz of wine, or 1½ oz of hard liquor.  · Work with your health care provider to maintain a healthy body weight or to lose weight. Ask what an ideal weight is for you.  · Get at least 30 minutes of exercise that causes your heart to beat faster (aerobic exercise) most days of the week. Activities may include walking, swimming, or biking.  · Work with your health care provider or diet and nutrition specialist (dietitian) to adjust your eating plan to your individual calorie needs.  Reading food labels  · Check food labels for the amount of sodium per serving. Choose foods with less than 5 percent of the Daily Value of sodium. Generally, foods with less than 300 mg of sodium per serving fit into this eating plan.  · To find whole grains, look for the word \"whole\" as the first word in the ingredient list.  Shopping  · Buy products labeled as \"low-sodium\" or \"no salt added.\"  · Buy fresh foods. Avoid canned foods and premade or frozen meals.  Cooking  · Avoid adding salt when cooking. Use salt-free seasonings or herbs instead of table salt or sea salt. Check with your health care provider or pharmacist before using salt substitutes.  · Do not alexander foods. Cook foods using healthy methods such as baking, boiling, grilling, and broiling instead.  · Cook " with heart-healthy oils, such as olive, canola, soybean, or sunflower oil.  Meal planning    · Eat a balanced diet that includes:  ? 5 or more servings of fruits and vegetables each day. At each meal, try to fill half of your plate with fruits and vegetables.  ? Up to 6-8 servings of whole grains each day.  ? Less than 6 oz of lean meat, poultry, or fish each day. A 3-oz serving of meat is about the same size as a deck of cards. One egg equals 1 oz.  ? 2 servings of low-fat dairy each day.  ? A serving of nuts, seeds, or beans 5 times each week.  ? Heart-healthy fats. Healthy fats called Omega-3 fatty acids are found in foods such as flaxseeds and coldwater fish, like sardines, salmon, and mackerel.  · Limit how much you eat of the following:  ? Canned or prepackaged foods.  ? Food that is high in trans fat, such as fried foods.  ? Food that is high in saturated fat, such as fatty meat.  ? Sweets, desserts, sugary drinks, and other foods with added sugar.  ? Full-fat dairy products.  · Do not salt foods before eating.  · Try to eat at least 2 vegetarian meals each week.  · Eat more home-cooked food and less restaurant, buffet, and fast food.  · When eating at a restaurant, ask that your food be prepared with less salt or no salt, if possible.  What foods are recommended?  The items listed may not be a complete list. Talk with your dietitian about what dietary choices are best for you.  Grains  Whole-grain or whole-wheat bread. Whole-grain or whole-wheat pasta. Brown rice. Oatmeal. Quinoa. Bulgur. Whole-grain and low-sodium cereals. Mercedes bread. Low-fat, low-sodium crackers. Whole-wheat flour tortillas.  Vegetables  Fresh or frozen vegetables (raw, steamed, roasted, or grilled). Low-sodium or reduced-sodium tomato and vegetable juice. Low-sodium or reduced-sodium tomato sauce and tomato paste. Low-sodium or reduced-sodium canned vegetables.  Fruits  All fresh, dried, or frozen fruit. Canned fruit in natural juice  (without added sugar).  Meat and other protein foods  Skinless chicken or turkey. Ground chicken or turkey. Pork with fat trimmed off. Fish and seafood. Egg whites. Dried beans, peas, or lentils. Unsalted nuts, nut butters, and seeds. Unsalted canned beans. Lean cuts of beef with fat trimmed off. Low-sodium, lean deli meat.  Dairy  Low-fat (1%) or fat-free (skim) milk. Fat-free, low-fat, or reduced-fat cheeses. Nonfat, low-sodium ricotta or cottage cheese. Low-fat or nonfat yogurt. Low-fat, low-sodium cheese.  Fats and oils  Soft margarine without trans fats. Vegetable oil. Low-fat, reduced-fat, or light mayonnaise and salad dressings (reduced-sodium). Canola, safflower, olive, soybean, and sunflower oils. Avocado.  Seasoning and other foods  Herbs. Spices. Seasoning mixes without salt. Unsalted popcorn and pretzels. Fat-free sweets.  What foods are not recommended?  The items listed may not be a complete list. Talk with your dietitian about what dietary choices are best for you.  Grains  Baked goods made with fat, such as croissants, muffins, or some breads. Dry pasta or rice meal packs.  Vegetables  Creamed or fried vegetables. Vegetables in a cheese sauce. Regular canned vegetables (not low-sodium or reduced-sodium). Regular canned tomato sauce and paste (not low-sodium or reduced-sodium). Regular tomato and vegetable juice (not low-sodium or reduced-sodium). Pickles. Olives.  Fruits  Canned fruit in a light or heavy syrup. Fried fruit. Fruit in cream or butter sauce.  Meat and other protein foods  Fatty cuts of meat. Ribs. Fried meat. Lorenzo. Sausage. Bologna and other processed lunch meats. Salami. Fatback. Hotdogs. Bratwurst. Salted nuts and seeds. Canned beans with added salt. Canned or smoked fish. Whole eggs or egg yolks. Chicken or turkey with skin.  Dairy  Whole or 2% milk, cream, and half-and-half. Whole or full-fat cream cheese. Whole-fat or sweetened yogurt. Full-fat cheese. Nondairy creamers. Whipped  toppings. Processed cheese and cheese spreads.  Fats and oils  Butter. Stick margarine. Lard. Shortening. Ghee. Lorenzo fat. Tropical oils, such as coconut, palm kernel, or palm oil.  Seasoning and other foods  Salted popcorn and pretzels. Onion salt, garlic salt, seasoned salt, table salt, and sea salt. Worcestershire sauce. Tartar sauce. Barbecue sauce. Teriyaki sauce. Soy sauce, including reduced-sodium. Steak sauce. Canned and packaged gravies. Fish sauce. Oyster sauce. Cocktail sauce. Horseradish that you find on the shelf. Ketchup. Mustard. Meat flavorings and tenderizers. Bouillon cubes. Hot sauce and Tabasco sauce. Premade or packaged marinades. Premade or packaged taco seasonings. Relishes. Regular salad dressings.  Where to find more information:  · National Heart, Lung, and [employer] [place]: www.nhlbi.nih.gov  · American Heart Association: www.heart.org  Summary  · The DASH eating plan is a healthy eating plan that has been shown to reduce high blood pressure (hypertension). It may also reduce your risk for type 2 diabetes, heart disease, and stroke.  · With the DASH eating plan, you should limit salt (sodium) intake to 2,300 mg a day. If you have hypertension, you may need to reduce your sodium intake to 1,500 mg a day.  · When on the DASH eating plan, aim to eat more fresh fruits and vegetables, whole grains, lean proteins, low-fat dairy, and heart-healthy fats.  · Work with your health care provider or diet and nutrition specialist (dietitian) to adjust your eating plan to your individual calorie needs.  This information is not intended to replace advice given to you by your health care provider. Make sure you discuss any questions you have with your health care provider.  Document Released: 12/06/2012 Document Revised: 12/11/2017 Document Reviewed: 12/11/2017  [employer] Interactive Patient Education © 2018 [employer]      You were provided a stool collection kit today. Please collect your sample as  explained and return the kit as soon as possible.

## 2018-10-29 ENCOUNTER — TELEPHONE (OUTPATIENT)
Dept: INTERNAL MEDICINE | Facility: CLINIC | Age: 53
End: 2018-10-29

## 2018-10-30 ENCOUNTER — OFFICE VISIT (OUTPATIENT)
Dept: RETAIL CLINIC | Facility: CLINIC | Age: 53
End: 2018-10-30

## 2018-10-30 VITALS
SYSTOLIC BLOOD PRESSURE: 138 MMHG | DIASTOLIC BLOOD PRESSURE: 60 MMHG | TEMPERATURE: 98.1 F | HEART RATE: 73 BPM | OXYGEN SATURATION: 94 %

## 2018-10-30 DIAGNOSIS — J06.9 UPPER RESPIRATORY TRACT INFECTION, UNSPECIFIED TYPE: Primary | ICD-10-CM

## 2018-10-30 DIAGNOSIS — H65.111 ACUTE MUCOID OTITIS MEDIA OF RIGHT EAR: ICD-10-CM

## 2018-10-30 PROCEDURE — 99213 OFFICE O/P EST LOW 20 MIN: CPT | Performed by: NURSE PRACTITIONER

## 2018-10-30 RX ORDER — DEXTROMETHORPHAN HYDROBROMIDE AND PROMETHAZINE HYDROCHLORIDE 15; 6.25 MG/5ML; MG/5ML
5 SYRUP ORAL 4 TIMES DAILY PRN
Qty: 118 ML | Refills: 0 | Status: SHIPPED | OUTPATIENT
Start: 2018-10-30 | End: 2019-01-03

## 2018-10-30 RX ORDER — AMOXICILLIN AND CLAVULANATE POTASSIUM 875; 125 MG/1; MG/1
1 TABLET, FILM COATED ORAL EVERY 12 HOURS SCHEDULED
Qty: 20 TABLET | Refills: 0 | Status: SHIPPED | OUTPATIENT
Start: 2018-10-30 | End: 2018-11-30

## 2018-10-30 RX ORDER — GUAIFENESIN AND DEXTROMETHORPHAN HYDROBROMIDE 600; 30 MG/1; MG/1
1 TABLET, EXTENDED RELEASE ORAL 2 TIMES DAILY
Qty: 20 TABLET | Refills: 0 | Status: SHIPPED | OUTPATIENT
Start: 2018-10-30 | End: 2018-11-30

## 2018-10-30 NOTE — PATIENT INSTRUCTIONS
Otitis Media, Adult  Otitis media occurs when there is inflammation and fluid in the middle ear. Your middle ear is a part of the ear that contains bones for hearing as well as air that helps send sounds to your brain.  What are the causes?  This condition is caused by a blockage in the eustachian tube. This tube drains fluid from the ear to the back of the nose (nasopharynx). A blockage in this tube can be caused by an object or by swelling (edema) in the tube. Problems that can cause a blockage include:  · A cold or other upper respiratory infection.  · Allergies.  · An irritant, such as tobacco smoke.  · Enlarged adenoids. The adenoids are areas of soft tissue located high in the back of the throat, behind the nose and the roof of the mouth.  · A mass in the nasopharynx.  · Damage to the ear caused by pressure changes (barotrauma).    What are the signs or symptoms?  Symptoms of this condition include:  · Ear pain.  · A fever.  · Decreased hearing.  · A headache.  · Tiredness (lethargy).  · Fluid leaking from the ear.  · Ringing in the ear.    How is this diagnosed?  This condition is diagnosed with a physical exam. During the exam your health care provider will use an instrument called an otoscope to look into your ear and check for redness, swelling, and fluid. He or she will also ask about your symptoms.  Your health care provider may also order tests, such as:  · A test to check the movement of the eardrum (pneumatic otoscopy). This test is done by squeezing a small amount of air into the ear.  · A test that changes air pressure in the middle ear to check how well the eardrum moves and whether the eustachian tube is working (tympanogram).    How is this treated?  This condition usually goes away on its own within 3-5 days. But if the condition is caused by a bacteria infection and does not go away own its own, or keeps coming back, your health care provider may:  · Prescribe antibiotic medicines to treat the  "infection.  · Prescribe or recommend medicines to control pain.    Follow these instructions at home:  · Take over-the-counter and prescription medicines only as told by your health care provider.  · If you were prescribed an antibiotic medicine, take it as told by your health care provider. Do not stop taking the antibiotic even if you start to feel better.  · Keep all follow-up visits as told by your health care provider. This is important.  Contact a health care provider if:  · You have bleeding from your nose.  · There is a lump on your neck.  · You are not getting better in 5 days.  · You feel worse instead of better.  Get help right away if:  · You have severe pain that is not controlled with medicine.  · You have swelling, redness, or pain around your ear.  · You have stiffness in your neck.  · A part of your face is paralyzed.  · The bone behind your ear (mastoid) is tender when you touch it.  · You develop a severe headache.  Summary  · Otitis media is redness, soreness, and swelling of the middle ear.  · This condition usually goes away on its own within 3-5 days.  · If the problem does not go away in 3-5 days, your health care provider may prescribe or recommend medicines to treat your symptoms.  · If you were prescribed an antibiotic medicine, take it as told by your health care provider.  This information is not intended to replace advice given to you by your health care provider. Make sure you discuss any questions you have with your health care provider.  Document Released: 09/22/2005 Document Revised: 12/08/2017 Document Reviewed: 12/08/2017  [employer] Interactive Patient Education © 2018 [employer]  Upper Respiratory Infection, Adult  Most upper respiratory infections (URIs) are a viral infection of the air passages leading to the lungs. A URI affects the nose, throat, and upper air passages. The most common type of URI is nasopharyngitis and is typically referred to as \"the common cold.\"  URIs " run their course and usually go away on their own. Most of the time, a URI does not require medical attention, but sometimes a bacterial infection in the upper airways can follow a viral infection. This is called a secondary infection. Sinus and middle ear infections are common types of secondary upper respiratory infections.  Bacterial pneumonia can also complicate a URI. A URI can worsen asthma and chronic obstructive pulmonary disease (COPD). Sometimes, these complications can require emergency medical care and may be life threatening.  What are the causes?  Almost all URIs are caused by viruses. A virus is a type of germ and can spread from one person to another.  What increases the risk?  You may be at risk for a URI if:  · You smoke.  · You have chronic heart or lung disease.  · You have a weakened defense (immune) system.  · You are very young or very old.  · You have nasal allergies or asthma.  · You work in crowded or poorly ventilated areas.  · You work in health care facilities or schools.    What are the signs or symptoms?  Symptoms typically develop 2-3 days after you come in contact with a cold virus. Most viral URIs last 7-10 days. However, viral URIs from the influenza virus (flu virus) can last 14-18 days and are typically more severe. Symptoms may include:  · Runny or stuffy (congested) nose.  · Sneezing.  · Cough.  · Sore throat.  · Headache.  · Fatigue.  · Fever.  · Loss of appetite.  · Pain in your forehead, behind your eyes, and over your cheekbones (sinus pain).  · Muscle aches.    How is this diagnosed?  Your health care provider may diagnose a URI by:  · Physical exam.  · Tests to check that your symptoms are not due to another condition such as:  ? Strep throat.  ? Sinusitis.  ? Pneumonia.  ? Asthma.    How is this treated?  A URI goes away on its own with time. It cannot be cured with medicines, but medicines may be prescribed or recommended to relieve symptoms. Medicines may  help:  · Reduce your fever.  · Reduce your cough.  · Relieve nasal congestion.    Follow these instructions at home:  · Take medicines only as directed by your health care provider.  · Gargle warm saltwater or take cough drops to comfort your throat as directed by your health care provider.  · Use a warm mist humidifier or inhale steam from a shower to increase air moisture. This may make it easier to breathe.  · Drink enough fluid to keep your urine clear or pale yellow.  · Eat soups and other clear broths and maintain good nutrition.  · Rest as needed.  · Return to work when your temperature has returned to normal or as your health care provider advises. You may need to stay home longer to avoid infecting others. You can also use a face mask and careful hand washing to prevent spread of the virus.  · Increase the usage of your inhaler if you have asthma.  · Do not use any tobacco products, including cigarettes, chewing tobacco, or electronic cigarettes. If you need help quitting, ask your health care provider.  How is this prevented?  The best way to protect yourself from getting a cold is to practice good hygiene.  · Avoid oral or hand contact with people with cold symptoms.  · Wash your hands often if contact occurs.    There is no clear evidence that vitamin C, vitamin E, echinacea, or exercise reduces the chance of developing a cold. However, it is always recommended to get plenty of rest, exercise, and practice good nutrition.  Contact a health care provider if:  · You are getting worse rather than better.  · Your symptoms are not controlled by medicine.  · You have chills.  · You have worsening shortness of breath.  · You have brown or red mucus.  · You have yellow or brown nasal discharge.  · You have pain in your face, especially when you bend forward.  · You have a fever.  · You have swollen neck glands.  · You have pain while swallowing.  · You have white areas in the back of your throat.  Get help right  away if:  · You have severe or persistent:  ? Headache.  ? Ear pain.  ? Sinus pain.  ? Chest pain.  · You have chronic lung disease and any of the following:  ? Wheezing.  ? Prolonged cough.  ? Coughing up blood.  ? A change in your usual mucus.  · You have a stiff neck.  · You have changes in your:  ? Vision.  ? Hearing.  ? Thinking.  ? Mood.  This information is not intended to replace advice given to you by your health care provider. Make sure you discuss any questions you have with your health care provider.  Document Released: 06/13/2002 Document Revised: 08/20/2017 Document Reviewed: 03/25/2015  [employer] Interactive Patient Education © 2018 [employer]

## 2018-10-30 NOTE — PROGRESS NOTES
FINESSE Loew is a 52 y.o. female.   Chief Complaint   Patient presents with   • Cough   • Nasal Congestion   • Earache     right      History of Present Illness   Patient presents with congested cough with yellow phlegm. She has a sore throat and nasal congestion. These symptoms have been present for over a week. She reports a fever and chills as well. She is taking allergy medication and flonase. She has no cough medication and she states the cough is keeping her awake at night.   The following portions of the patient's history were reviewed and updated as appropriate: allergies, current medications, past family history, past medical history, past social history, past surgical history and problem list.    Current Outpatient Prescriptions:   •  amoxicillin-clavulanate (AUGMENTIN) 875-125 MG per tablet, Take 1 tablet by mouth Every 12 (Twelve) Hours., Disp: 20 tablet, Rfl: 0  •  B Complex Vitamins (VITAMIN B COMPLEX PO), Take 1 tablet by mouth Daily As Needed., Disp: , Rfl:   •  Blood Pressure Monitoring (BLOOD PRESSURE CUFF) misc, , Disp: , Rfl:   •  DULERA 200-5 MCG/ACT inhaler, inhale 2 puffs by mouth twice a day, Disp: 13 g, Rfl: 5  •  fluticasone (FLONASE) 50 MCG/ACT nasal spray, instill 1 spray into each nostril once daily, Disp: 16 g, Rfl: 1  •  Fluticasone Furoate-Vilanterol 100-25 MCG/INH aerosol powder , Inhale 1 puff Daily., Disp: 1 each, Rfl: 11  •  gabapentin (NEURONTIN) 400 MG capsule, Take 400 mg by mouth 3 (Three) Times a Day., Disp: , Rfl: 0  •  guaifenesin-dextromethorphan (MUCINEX DM)  MG tablet sustained-release 12 hour tablet, Take 1 tablet by mouth 2 (Two) Times a Day., Disp: 20 tablet, Rfl: 0  •  hydrOXYzine (ATARAX) 25 MG tablet, Take 1 tablet by mouth at bedtime as needed for insomnia, Disp: 30 tablet, Rfl: 2  •  ketoconazole (NIZORAL) 2 % shampoo, Apply  topically 2 (two) times a week. Apply to affected area, Disp: 120 mL, Rfl: 1  •  lansoprazole (PREVACID) 30  MG capsule, take 1 capsule by mouth daily, Disp: 30 capsule, Rfl: 0  •  latanoprost (XALATAN) 0.005 % ophthalmic solution, Administer 1 drop to both eyes every night., Disp: , Rfl:   •  LINZESS 72 MCG capsule capsule, take 1 capsule by mouth every morning, Disp: 30 capsule, Rfl: 0  •  lisinopril (PRINIVIL,ZESTRIL) 20 MG tablet, take 1 tablet by mouth at bedtime, Disp: 30 tablet, Rfl: 1  •  meloxicam (MOBIC) 7.5 MG tablet, Take 1 tablet by mouth Daily., Disp: 30 tablet, Rfl: 2  •  Multiple Vitamins-Minerals (HAIR/SKIN/NAILS/BIOTIN) tablet, Take 1 tablet by mouth Daily As Needed., Disp: , Rfl:   •  neomycin-polymyxin-hydrocortisone (CORTISPORIN) 3.5-14481-2 otic solution, Administer 3 drops into both ears 3 (Three) Times a Day., Disp: 10 mL, Rfl: 0  •  promethazine-dextromethorphan (PROMETHAZINE-DM) 6.25-15 MG/5ML syrup, Take 5 mL by mouth 4 (Four) Times a Day As Needed for Cough., Disp: 118 mL, Rfl: 0  •  RA ALLERGY RELIEF 10 MG tablet, take 1 tablet by mouth daily, Disp: 30 tablet, Rfl: 5  •  sertraline (ZOLOFT) 100 MG tablet, Take 1 tablet by mouth Daily., Disp: 30 tablet, Rfl: 2  •  simvastatin (ZOCOR) 20 MG tablet, take 1 tablet by mouth at bedtime, Disp: 30 tablet, Rfl: 5  •  SPIRIVA HANDIHALER 18 MCG per inhalation capsule, inhale the contents of one capsule in the handihaler once daily, Disp: 30 capsule, Rfl: 1  •  traZODone (DESYREL) 50 MG tablet, Take 1 tablet by mouth Every Night., Disp: 30 tablet, Rfl: 2  •  vitamin B-12 (CYANOCOBALAMIN) 1000 MCG tablet, Take 5,000 mcg by mouth Daily As Needed., Disp: , Rfl:     Allergies   Allergen Reactions   • Cyclobenzaprine        Review of Systems   Constitutional: Positive for activity change, chills, fatigue and fever.   HENT: Positive for congestion, postnasal drip, rhinorrhea and sore throat. Negative for ear pain, sinus pain, sinus pressure, trouble swallowing and voice change.    Eyes: Negative.    Respiratory: Positive for cough. Negative for shortness of  breath and wheezing.    Cardiovascular: Negative.    Musculoskeletal: Negative.    Skin: Negative.    Allergic/Immunologic: Positive for environmental allergies.   Neurological: Negative.    Hematological: Negative.        Objective     Visit Vitals  /60   Pulse 73   Temp 98.1 °F (36.7 °C)   LMP 09/01/2013   SpO2 94%         Physical Exam   Constitutional: She is oriented to person, place, and time. Vital signs are normal. She appears well-developed and well-nourished. She is cooperative.  Non-toxic appearance. She does not have a sickly appearance. She does not appear ill.   HENT:   Head: Normocephalic and atraumatic.   Right Ear: Hearing, external ear and ear canal normal. Tympanic membrane is injected and erythematous.   Left Ear: Hearing, tympanic membrane, external ear and ear canal normal.   Nose: Mucosal edema and rhinorrhea present. Right sinus exhibits no maxillary sinus tenderness and no frontal sinus tenderness. Left sinus exhibits no maxillary sinus tenderness and no frontal sinus tenderness.   Mouth/Throat: Mucous membranes are normal. Posterior oropharyngeal erythema present. No oropharyngeal exudate or posterior oropharyngeal edema. Tonsils are 0 on the right. Tonsils are 0 on the left. No tonsillar exudate.   Eyes: Conjunctivae are normal.   Cardiovascular: Normal rate, regular rhythm and normal heart sounds.    Pulmonary/Chest: Effort normal. No stridor. No respiratory distress. She has no wheezes. She has no rales.   Diffuse scattered rhonchi bilaterally   Neurological: She is alert and oriented to person, place, and time.   Skin: Skin is warm and dry.   Psychiatric: She has a normal mood and affect.   Nursing note and vitals reviewed.      Lab Results (last 24 hours)     ** No results found for the last 24 hours. **          Assessment/Plan   Adrianne was seen today for cough, nasal congestion and earache.    Diagnoses and all orders for this visit:    Upper respiratory tract infection,  unspecified type  -     promethazine-dextromethorphan (PROMETHAZINE-DM) 6.25-15 MG/5ML syrup; Take 5 mL by mouth 4 (Four) Times a Day As Needed for Cough.  -     guaifenesin-dextromethorphan (MUCINEX DM)  MG tablet sustained-release 12 hour tablet; Take 1 tablet by mouth 2 (Two) Times a Day.    Acute mucoid otitis media of right ear  -     amoxicillin-clavulanate (AUGMENTIN) 875-125 MG per tablet; Take 1 tablet by mouth Every 12 (Twelve) Hours.    Shari Larkin, APRN

## 2018-11-06 DIAGNOSIS — J44.9 CHRONIC OBSTRUCTIVE PULMONARY DISEASE, UNSPECIFIED COPD TYPE (HCC): ICD-10-CM

## 2018-11-06 RX ORDER — TIOTROPIUM BROMIDE 18 UG/1
CAPSULE ORAL; RESPIRATORY (INHALATION)
Qty: 30 CAPSULE | Refills: 1 | Status: SHIPPED | OUTPATIENT
Start: 2018-11-06 | End: 2019-01-14 | Stop reason: SDUPTHER

## 2018-11-12 DIAGNOSIS — E78.5 HYPERLIPIDEMIA, UNSPECIFIED HYPERLIPIDEMIA TYPE: ICD-10-CM

## 2018-11-12 DIAGNOSIS — I10 ESSENTIAL HYPERTENSION: ICD-10-CM

## 2018-11-12 DIAGNOSIS — F32.A DEPRESSION, UNSPECIFIED DEPRESSION TYPE: ICD-10-CM

## 2018-11-12 DIAGNOSIS — F41.9 ANXIETY: ICD-10-CM

## 2018-11-12 DIAGNOSIS — J30.89 SEASONAL ALLERGIC RHINITIS DUE TO OTHER ALLERGIC TRIGGER: ICD-10-CM

## 2018-11-14 RX ORDER — LISINOPRIL 20 MG/1
TABLET ORAL
Qty: 30 TABLET | Refills: 1 | Status: SHIPPED | OUTPATIENT
Start: 2018-11-14 | End: 2019-01-14 | Stop reason: SDUPTHER

## 2018-11-14 RX ORDER — FLUTICASONE PROPIONATE 50 MCG
SPRAY, SUSPENSION (ML) NASAL
Qty: 16 G | Refills: 1 | Status: SHIPPED | OUTPATIENT
Start: 2018-11-14 | End: 2019-01-14 | Stop reason: SDUPTHER

## 2018-11-14 RX ORDER — SIMVASTATIN 20 MG
TABLET ORAL
Qty: 30 TABLET | Refills: 1 | Status: SHIPPED | OUTPATIENT
Start: 2018-11-14 | End: 2019-01-14 | Stop reason: SDUPTHER

## 2018-11-14 RX ORDER — HYDROXYZINE HYDROCHLORIDE 25 MG/1
TABLET, FILM COATED ORAL
Qty: 30 TABLET | Refills: 1 | Status: SHIPPED | OUTPATIENT
Start: 2018-11-14 | End: 2019-01-14 | Stop reason: SDUPTHER

## 2018-11-16 DIAGNOSIS — K21.9 GASTROESOPHAGEAL REFLUX DISEASE WITHOUT ESOPHAGITIS: ICD-10-CM

## 2018-11-19 RX ORDER — LANSOPRAZOLE 30 MG/1
30 CAPSULE, DELAYED RELEASE ORAL DAILY
Qty: 30 CAPSULE | Refills: 0 | OUTPATIENT
Start: 2018-11-19

## 2018-11-28 ENCOUNTER — LAB REQUISITION (OUTPATIENT)
Dept: LAB | Facility: HOSPITAL | Age: 53
End: 2018-11-28

## 2018-11-28 DIAGNOSIS — D17.1 BENIGN LIPOMATOUS NEOPLASM OF SKIN AND SUBCUTANEOUS TISSUE OF TRUNK: ICD-10-CM

## 2018-11-28 PROCEDURE — 88304 TISSUE EXAM BY PATHOLOGIST: CPT | Performed by: SURGERY

## 2018-11-29 LAB
CYTO UR: NORMAL
LAB AP CASE REPORT: NORMAL
LAB AP CLINICAL INFORMATION: NORMAL
PATH REPORT.FINAL DX SPEC: NORMAL
PATH REPORT.GROSS SPEC: NORMAL

## 2018-11-30 ENCOUNTER — OFFICE VISIT (OUTPATIENT)
Dept: INTERNAL MEDICINE | Facility: CLINIC | Age: 53
End: 2018-11-30

## 2018-11-30 ENCOUNTER — TELEPHONE (OUTPATIENT)
Dept: INTERNAL MEDICINE | Facility: CLINIC | Age: 53
End: 2018-11-30

## 2018-11-30 VITALS
BODY MASS INDEX: 45.79 KG/M2 | WEIGHT: 238.4 LBS | DIASTOLIC BLOOD PRESSURE: 66 MMHG | HEART RATE: 110 BPM | SYSTOLIC BLOOD PRESSURE: 116 MMHG | TEMPERATURE: 97.4 F | RESPIRATION RATE: 20 BRPM

## 2018-11-30 DIAGNOSIS — J02.9 SORE THROAT: ICD-10-CM

## 2018-11-30 DIAGNOSIS — J40 BRONCHITIS: Primary | ICD-10-CM

## 2018-11-30 LAB
EXPIRATION DATE: NORMAL
INTERNAL CONTROL: NORMAL
Lab: NORMAL
S PYO AG THROAT QL: NEGATIVE

## 2018-11-30 PROCEDURE — 99214 OFFICE O/P EST MOD 30 MIN: CPT | Performed by: NURSE PRACTITIONER

## 2018-11-30 PROCEDURE — 87880 STREP A ASSAY W/OPTIC: CPT | Performed by: NURSE PRACTITIONER

## 2018-11-30 RX ORDER — METHYLPREDNISOLONE 4 MG/1
TABLET ORAL
Qty: 1 EACH | Refills: 0 | Status: SHIPPED | OUTPATIENT
Start: 2018-11-30 | End: 2019-01-03

## 2018-11-30 RX ORDER — HYDROCODONE BITARTRATE AND ACETAMINOPHEN 10; 325 MG/1; MG/1
TABLET ORAL
Refills: 0 | COMMUNITY
Start: 2018-11-28 | End: 2019-01-14

## 2018-11-30 RX ORDER — BENZONATATE 200 MG/1
200 CAPSULE ORAL 3 TIMES DAILY PRN
Qty: 30 CAPSULE | Refills: 0 | Status: SHIPPED | OUTPATIENT
Start: 2018-11-30 | End: 2019-01-03

## 2018-11-30 RX ORDER — GABAPENTIN 600 MG/1
TABLET ORAL
Refills: 0 | COMMUNITY
Start: 2018-08-29 | End: 2019-01-14

## 2018-11-30 RX ORDER — AZITHROMYCIN 250 MG/1
TABLET, FILM COATED ORAL
Qty: 6 TABLET | Refills: 0 | Status: SHIPPED | OUTPATIENT
Start: 2018-11-30 | End: 2019-01-03

## 2018-11-30 NOTE — TELEPHONE ENCOUNTER
----- Message from Britney Lopez sent at 11/29/2018  2:47 PM EST -----  PATIENT STATED THAT SHE IS NEEDING AN ANTIBIOTIC AND COUGH SYRUP CALLED IN.     [facility] [phone]  - [phone]     PATIENT CALL BACK : [phone]    THANK YOU

## 2018-11-30 NOTE — PROGRESS NOTES
Chief Complaint   Patient presents with   • Cough     cough with green drainage        Subjective     History of Present Illness   Presents today with cough green thick discharge.  No fever or shortness of breath positive wheezing.  The patient was sick 2-3 weeks ago and was seen in a walk-in clinic and prescribed amoxicillin and her symptoms have not improved.  Both of her children have recently been sick with similar symptoms.  The patient has not tried over-the-counter medications to help her symptoms.  There's been no nausea vomiting diarrhea.  No rash.  The following portions of the patient's history were reviewed and updated as appropriate: allergies, current medications, past family history, past medical history, past social history, past surgical history and problem list.    Review of Systems   Constitutional: Positive for fatigue. Negative for activity change, appetite change, chills and fever.   HENT: Positive for congestion, sinus pressure and sore throat. Negative for postnasal drip.    Eyes: Negative for visual disturbance.   Respiratory: Positive for cough and wheezing. Negative for shortness of breath.    Cardiovascular: Negative for chest pain, palpitations and leg swelling.   Gastrointestinal: Negative for abdominal pain, constipation, diarrhea, nausea and GERD.   Musculoskeletal: Negative for arthralgias and myalgias.   Skin: Negative for rash.   Allergic/Immunologic: Negative for environmental allergies.   Neurological: Negative for dizziness.   Psychiatric/Behavioral: Negative for sleep disturbance.   All other systems reviewed and are negative.      Objective   Physical Exam   Constitutional: She is oriented to person, place, and time. She appears well-developed and well-nourished.   HENT:   Head: Normocephalic and atraumatic.   Right Ear: External ear normal.   Left Ear: External ear normal.   Bilateral TMs retracted nasal mucosa edematous posterior nasal drainage moderate clear and there is no  erythema of the posterior pharynx   Eyes: Conjunctivae are normal.   Cardiovascular: Normal rate and regular rhythm.   Pulmonary/Chest: Effort normal.   Wheezing throughout deep congested occasional cough no rales   Abdominal: Soft. Bowel sounds are normal. She exhibits no distension. There is no tenderness.   Musculoskeletal: She exhibits no edema.   Lymphadenopathy:     She has no cervical adenopathy.   Neurological: She is alert and oriented to person, place, and time.   Skin: Capillary refill takes 2 to 3 seconds.   Psychiatric: She has a normal mood and affect. Her behavior is normal.   Nursing note and vitals reviewed.          Results for orders placed or performed in visit on 11/30/18   POC Rapid Strep A   Result Value Ref Range    Rapid Strep A Screen Negative Negative, VALID, INVALID, Not Performed    Internal Control Passed Passed    Lot Number [id number]     Expiration Date 4-30-20         Assessment/Plan   Adrianne was seen today for cough.    Diagnoses and all orders for this visit:    Bronchitis  -     azithromycin (ZITHROMAX Z-DRAKE) 250 MG tablet; Take 2 tablets the first day, then 1 tablet daily for 4 days.  -     MethylPREDNISolone (MEDROL, DRAKE,) 4 MG tablet; Take as directed on package instructions.  -     benzonatate (TESSALON) 200 MG capsule; Take 1 capsule by mouth 3 (Three) Times a Day As Needed for Cough.    Sore throat  -     POC Rapid Strep A    ST likely due to drainage.      Return if symptoms worsen or fail to improve.  RTC/call  If symptoms worsen  Meds MOA and SE's reviewed and pt v/u

## 2018-12-11 DIAGNOSIS — M54.50 CHRONIC LOW BACK PAIN WITHOUT SCIATICA, UNSPECIFIED BACK PAIN LATERALITY: ICD-10-CM

## 2018-12-11 DIAGNOSIS — K21.9 GASTROESOPHAGEAL REFLUX DISEASE WITHOUT ESOPHAGITIS: ICD-10-CM

## 2018-12-11 DIAGNOSIS — G89.29 CHRONIC LOW BACK PAIN WITHOUT SCIATICA, UNSPECIFIED BACK PAIN LATERALITY: ICD-10-CM

## 2018-12-11 RX ORDER — LANSOPRAZOLE 30 MG/1
30 CAPSULE, DELAYED RELEASE ORAL DAILY
Qty: 30 CAPSULE | Refills: 0 | Status: SHIPPED | OUTPATIENT
Start: 2018-12-11 | End: 2019-02-13 | Stop reason: SDUPTHER

## 2018-12-11 RX ORDER — MELOXICAM 7.5 MG/1
7.5 TABLET ORAL DAILY
Qty: 30 TABLET | Refills: 5 | Status: SHIPPED | OUTPATIENT
Start: 2018-12-11 | End: 2019-07-12 | Stop reason: SDUPTHER

## 2018-12-13 DIAGNOSIS — E66.01 MORBIDLY OBESE (HCC): ICD-10-CM

## 2018-12-13 DIAGNOSIS — F22 DELUSIONAL DISORDER (HCC): ICD-10-CM

## 2018-12-14 RX ORDER — GABAPENTIN 400 MG/1
CAPSULE ORAL
Qty: 90 CAPSULE | Refills: 2 | OUTPATIENT
Start: 2018-12-14

## 2018-12-14 NOTE — TELEPHONE ENCOUNTER
Let the patient know she is overdue for her regular fasting follow-up visit which should have been in October.  Refill medication will be after follow-up visit.  Please have her make staying appointment for early next week.

## 2018-12-17 PROBLEM — E66.01 MORBIDLY OBESE (HCC): Status: ACTIVE | Noted: 2018-12-17

## 2018-12-17 RX ORDER — GABAPENTIN 400 MG/1
400 CAPSULE ORAL 3 TIMES DAILY
Qty: 90 CAPSULE | Refills: 2 | Status: SHIPPED | OUTPATIENT
Start: 2018-12-17 | End: 2019-04-08 | Stop reason: SDUPTHER

## 2019-01-03 ENCOUNTER — OFFICE VISIT (OUTPATIENT)
Dept: INTERNAL MEDICINE | Facility: CLINIC | Age: 54
End: 2019-01-03

## 2019-01-03 VITALS
BODY MASS INDEX: 45.61 KG/M2 | DIASTOLIC BLOOD PRESSURE: 70 MMHG | SYSTOLIC BLOOD PRESSURE: 124 MMHG | TEMPERATURE: 97.8 F | HEIGHT: 61 IN | RESPIRATION RATE: 18 BRPM | WEIGHT: 241.6 LBS | HEART RATE: 82 BPM

## 2019-01-03 DIAGNOSIS — R35.0 URINE FREQUENCY: ICD-10-CM

## 2019-01-03 DIAGNOSIS — R30.9 PAINFUL URINATION: Primary | ICD-10-CM

## 2019-01-03 LAB
BILIRUB BLD-MCNC: NEGATIVE MG/DL
CLARITY, POC: CLEAR
COLOR UR: YELLOW
EXPIRATION DATE: NORMAL
GLUCOSE UR STRIP-MCNC: NEGATIVE MG/DL
KETONES UR QL: NEGATIVE
LEUKOCYTE EST, POC: NEGATIVE
Lab: NORMAL
NITRITE UR-MCNC: NEGATIVE MG/ML
PH UR: 7 [PH] (ref 5–8)
PROT UR STRIP-MCNC: NEGATIVE MG/DL
RBC # UR STRIP: NEGATIVE /UL
SP GR UR: 1.01 (ref 1–1.03)
UROBILINOGEN UR QL: NORMAL

## 2019-01-03 PROCEDURE — 99213 OFFICE O/P EST LOW 20 MIN: CPT | Performed by: NURSE PRACTITIONER

## 2019-01-03 RX ORDER — SULFAMETHOXAZOLE AND TRIMETHOPRIM 800; 160 MG/1; MG/1
1 TABLET ORAL 2 TIMES DAILY
Qty: 6 TABLET | Refills: 0 | Status: SHIPPED | OUTPATIENT
Start: 2019-01-03 | End: 2019-01-14

## 2019-01-03 NOTE — PROGRESS NOTES
Subjective:    Adrianne Lowe is a 53 y.o. female.     Chief Complaint   Patient presents with   • Difficulty Urinating     x2 days        History of Present Illness   Patient complains of vaginal itching x 2 days. She feels like she cannot empty bladder completely when she urinates. No dysuria. She is having frequent trips to the bathroom, but having difficulty initiating and with complete emptying of urine. No vaginal discharge and not sexually active. She states she has had these symptoms with a UTI in the past. No fever. Patient has a history of constipation that is managed well with medication. Patient denies change in fluid intake or bathing habits.    Current Outpatient Medications:   •  B Complex Vitamins (VITAMIN B COMPLEX PO), Take 1 tablet by mouth Daily As Needed., Disp: , Rfl:   •  Blood Pressure Monitoring (BLOOD PRESSURE CUFF) misc, , Disp: , Rfl:   •  DULERA 200-5 MCG/ACT inhaler, inhale 2 puffs by mouth twice a day, Disp: 13 g, Rfl: 5  •  fluticasone (FLONASE) 50 MCG/ACT nasal spray, instill 1 spray into each nostril once daily, Disp: 16 g, Rfl: 1  •  Fluticasone Furoate-Vilanterol 100-25 MCG/INH aerosol powder , Inhale 1 puff Daily., Disp: 1 each, Rfl: 11  •  gabapentin (NEURONTIN) 400 MG capsule, Take 1 capsule by mouth 3 (Three) Times a Day., Disp: 90 capsule, Rfl: 2  •  gabapentin (NEURONTIN) 600 MG tablet, , Disp: , Rfl: 0  •  HYDROcodone-acetaminophen (NORCO)  MG per tablet, take 1 tablet by mouth every 6 hours if needed for pain, Disp: , Rfl: 0  •  hydrOXYzine (ATARAX) 25 MG tablet, take 1 tablet by mouth at bedtime if needed, Disp: 30 tablet, Rfl: 1  •  ketoconazole (NIZORAL) 2 % shampoo, Apply  topically 2 (two) times a week. Apply to affected area, Disp: 120 mL, Rfl: 1  •  lansoprazole (PREVACID) 30 MG capsule, take 1 capsule by mouth daily, Disp: 30 capsule, Rfl: 0  •  latanoprost (XALATAN) 0.005 % ophthalmic solution, Administer 1 drop to both eyes every night., Disp: , Rfl:    •  LINZESS 72 MCG capsule capsule, take 1 capsule by mouth every morning, Disp: 30 capsule, Rfl: 0  •  lisinopril (PRINIVIL,ZESTRIL) 20 MG tablet, take 1 tablet by mouth at bedtime, Disp: 30 tablet, Rfl: 1  •  meloxicam (MOBIC) 7.5 MG tablet, take 1 tablet by mouth daily, Disp: 30 tablet, Rfl: 5  •  Multiple Vitamins-Minerals (HAIR/SKIN/NAILS/BIOTIN) tablet, Take 1 tablet by mouth Daily As Needed., Disp: , Rfl:   •  RA ALLERGY RELIEF 10 MG tablet, take 1 tablet by mouth daily, Disp: 30 tablet, Rfl: 5  •  sertraline (ZOLOFT) 100 MG tablet, Take 1 tablet by mouth Daily., Disp: 30 tablet, Rfl: 2  •  simvastatin (ZOCOR) 20 MG tablet, take 1 tablet by mouth at bedtime, Disp: 30 tablet, Rfl: 1  •  SPIRIVA HANDIHALER 18 MCG per inhalation capsule, inhale the contents of one capsule in the handihaler once daily, Disp: 30 capsule, Rfl: 1  •  traZODone (DESYREL) 50 MG tablet, Take 1 tablet by mouth Every Night., Disp: 30 tablet, Rfl: 2  •  vitamin B-12 (CYANOCOBALAMIN) 1000 MCG tablet, Take 5,000 mcg by mouth Daily As Needed., Disp: , Rfl:   •  sulfamethoxazole-trimethoprim (BACTRIM DS) 800-160 MG per tablet, Take 1 tablet by mouth 2 (Two) Times a Day., Disp: 6 tablet, Rfl: 0     The following portions of the patient's history were reviewed and updated as appropriate: allergies, current medications, past family history, past medical history, past social history, past surgical history and problem list.    Review of Systems   Constitutional: Negative for chills and fever.   Respiratory: Negative for cough and shortness of breath.    Cardiovascular: Negative for chest pain.   Gastrointestinal: Negative for abdominal pain, blood in stool, constipation, diarrhea, nausea and vomiting.        Denies melena.   Genitourinary: Positive for difficulty urinating and frequency. Negative for dysuria, flank pain, hematuria, pelvic pain, urgency, vaginal bleeding and vaginal discharge.        Vaginal itching. Incomplete emptying.   Skin:  "Negative for rash.   Hematological: Negative for adenopathy.       Objective:    /70 (BP Location: Right arm, Patient Position: Sitting, Cuff Size: Adult)   Pulse 82   Temp 97.8 °F (36.6 °C)   Resp 18   Ht 153.7 cm (60.5\")   Wt 110 kg (241 lb 9.6 oz)   LMP 09/01/2013   BMI 46.41 kg/m²     Physical Exam   Constitutional: She is oriented to person, place, and time. She appears well-developed and well-nourished.  Non-toxic appearance. She does not have a sickly appearance. She does not appear ill. No distress.   HENT:   Head: Normocephalic and atraumatic.   Right Ear: External ear normal.   Left Ear: External ear normal.   Nose: Nose normal.   Mouth/Throat: Oropharynx is clear and moist and mucous membranes are normal.   Eyes: Lids are normal.   Neck: Normal range of motion.   Cardiovascular: Normal rate, regular rhythm and normal heart sounds.   No murmur heard.  Pulmonary/Chest: Effort normal and breath sounds normal.   Abdominal: Soft. Bowel sounds are normal. She exhibits no distension and no mass. There is no hepatosplenomegaly. There is tenderness in the suprapubic area. There is no CVA tenderness.   Lymphadenopathy:        Right: No inguinal adenopathy present.        Left: No inguinal adenopathy present.   Neurological: She is alert and oriented to person, place, and time.   Skin: Skin is warm, dry and intact. No rash noted.   Psychiatric: She has a normal mood and affect.   Nursing note and vitals reviewed.      Assessment/Plan:    Adrianne was seen today for difficulty urinating.    Diagnoses and all orders for this visit:    Painful urination  -     POCT urinalysis dipstick, automated    Urine frequency  -     sulfamethoxazole-trimethoprim (BACTRIM DS) 800-160 MG per tablet; Take 1 tablet by mouth 2 (Two) Times a Day.    Discussed to drink adequate amount of water. Follow up if no relief. Follow up if unable to produce urine.    Return if symptoms worsen or fail to improve.  "

## 2019-01-12 NOTE — PROGRESS NOTES
Chief Complaint   Patient presents with   • Follow-up   • Vaginal Itching        Subjective     History of Present Illness     The patient is here today for 3 month fasting follow up visit. She has had 1 can diet cola.     The patient has unspecified hyperplipidemia.  Down 2#.  She states she is following a heart healthy diet not exercising.     The patient has hypertension and is not checking BP at home but is elevated today.     The patient has COPD and has been wheezing. She has been using spiriva and dulera but does not have an albuterol rescue inhaler.     The patient has constipation which is stable with exacerbation.    The patient has chronic back pain without exacerbations.    The patient has GERD taking prevacid and reflux bothers her qd unless she is taking her prevacid.     The patient has obesity down 2#    The patient has insomnia the trazodone helps but does not stay asleep and snores    The patient has glaucoma and has seen ophthalmology seen last 2 months ago has ppt this month     The patient has allergies-wheezing     The patient has anxiety and depression hits at times     The patient has CTS without exacerbation.    The patient is tolerating meds without difficulty.    The patient was seen 10 d ago for urinary frequency and vaginal symptoms and rx bactrim ua normal seen by our NP Jaz.  Her symptoms have change no dysuria but itching.        Current Outpatient Medications:   •  B Complex Vitamins (VITAMIN B COMPLEX PO), Take 1 tablet by mouth Daily As Needed., Disp: , Rfl:   •  Blood Pressure Monitoring (BLOOD PRESSURE CUFF) misc, , Disp: , Rfl:   •  DULERA 200-5 MCG/ACT inhaler, inhale 2 puffs by mouth twice a day, Disp: 13 g, Rfl: 5  •  fluticasone (FLONASE) 50 MCG/ACT nasal spray, instill 1 spray into each nostril once daily, Disp: 16 g, Rfl: 1  •  Fluticasone Furoate-Vilanterol 100-25 MCG/INH aerosol powder , Inhale 1 puff Daily., Disp: 1 each, Rfl: 11  •  gabapentin (NEURONTIN) 400 MG  capsule, Take 1 capsule by mouth 3 (Three) Times a Day., Disp: 90 capsule, Rfl: 2  •  hydrOXYzine (ATARAX) 25 MG tablet, take 1 tablet by mouth at bedtime if needed, Disp: 30 tablet, Rfl: 1  •  lansoprazole (PREVACID) 30 MG capsule, take 1 capsule by mouth daily, Disp: 30 capsule, Rfl: 0  •  latanoprost (XALATAN) 0.005 % ophthalmic solution, Administer 1 drop to both eyes every night., Disp: , Rfl:   •  LINZESS 72 MCG capsule capsule, take 1 capsule by mouth every morning, Disp: 30 capsule, Rfl: 0  •  lisinopril (PRINIVIL,ZESTRIL) 30 MG tablet, Take 1 tablet by mouth every night at bedtime., Disp: 30 tablet, Rfl: 3  •  meloxicam (MOBIC) 7.5 MG tablet, take 1 tablet by mouth daily, Disp: 30 tablet, Rfl: 5  •  Multiple Vitamins-Minerals (HAIR/SKIN/NAILS/BIOTIN) tablet, Take 1 tablet by mouth Daily As Needed., Disp: , Rfl:   •  RA ALLERGY RELIEF 10 MG tablet, take 1 tablet by mouth daily, Disp: 30 tablet, Rfl: 5  •  sertraline (ZOLOFT) 100 MG tablet, Take 1 tablet by mouth Daily., Disp: 30 tablet, Rfl: 2  •  simvastatin (ZOCOR) 20 MG tablet, take 1 tablet by mouth at bedtime, Disp: 30 tablet, Rfl: 1  •  SPIRIVA HANDIHALER 18 MCG per inhalation capsule, inhale the contents of one capsule in the handihaler once daily, Disp: 30 capsule, Rfl: 1  •  traZODone (DESYREL) 50 MG tablet, Take 1 tablet by mouth Every Night., Disp: 30 tablet, Rfl: 2  •  vitamin B-12 (CYANOCOBALAMIN) 1000 MCG tablet, Take 5,000 mcg by mouth Daily As Needed., Disp: , Rfl:   •  albuterol sulfate  (90 Base) MCG/ACT inhaler, Inhale 2 puffs Every 4 (Four) Hours As Needed for Wheezing., Disp: 1 inhaler, Rfl: 0  •  buPROPion XL (WELLBUTRIN XL) 150 MG 24 hr tablet, Take 1 tablet by mouth Daily., Disp: 30 tablet, Rfl: 2  •  ketoconazole (NIZORAL) 2 % shampoo, Apply  topically 2 (two) times a week. Apply to affected area, Disp: 120 mL, Rfl: 1  •  montelukast (SINGULAIR) 10 MG tablet, Take 1 tablet by mouth Every Night., Disp: 30 tablet, Rfl: 5  •   terconazole (TERAZOL 3) 0.8 % vaginal cream, Insert 1 applicator into the vagina Every Night., Disp: 1 each, Rfl: 0        The following portions of the patient's history were reviewed and updated as appropriate: allergies, current medications, past family history, past medical history, past social history, past surgical history and problem list.    Review of Systems   Constitutional: Negative for activity change, appetite change, chills, fatigue and fever.   HENT: Positive for congestion. Negative for postnasal drip and sore throat.    Eyes: Negative for visual disturbance.   Respiratory: Positive for wheezing. Negative for cough and shortness of breath.    Cardiovascular: Negative for chest pain, palpitations and leg swelling.   Gastrointestinal: Negative for abdominal pain, constipation, diarrhea, nausea and GERD.   Genitourinary: Positive for vaginal discharge.   Musculoskeletal: Negative for arthralgias and myalgias.   Skin: Negative for rash.   Allergic/Immunologic: Negative for environmental allergies.   Neurological: Negative for dizziness.   Psychiatric/Behavioral: Positive for sleep disturbance and depressed mood. The patient is nervous/anxious.    All other systems reviewed and are negative.      Objective   Physical Exam   Constitutional: She is oriented to person, place, and time. She appears well-developed and well-nourished.   HENT:   Head: Normocephalic and atraumatic.   Right Ear: External ear normal.   Left Ear: External ear normal.   Nose: Nose normal.   Mouth/Throat: Oropharynx is clear and moist.   Eyes: Conjunctivae are normal.   Neck: No thyromegaly present.   Cardiovascular: Normal rate, regular rhythm and intact distal pulses.   Pulmonary/Chest: Effort normal and breath sounds normal.   Abdominal: Soft. Bowel sounds are normal. She exhibits no distension. There is no tenderness.   Musculoskeletal: She exhibits no edema.   Lymphadenopathy:     She has no cervical adenopathy.   Neurological:  She is alert and oriented to person, place, and time.   Skin: Skin is warm and dry. Capillary refill takes 2 to 3 seconds.   Psychiatric: She has a normal mood and affect. Her behavior is normal.   Nursing note and vitals reviewed.         Assessment/Plan   Adrianne was seen today for follow-up and vaginal itching.    Diagnoses and all orders for this visit:    Hyperlipidemia, unspecified hyperlipidemia type  -     Comprehensive Metabolic Panel  -     TSH  -     Lipid Panel    Essential hypertension  -     lisinopril (PRINIVIL,ZESTRIL) 30 MG tablet; Take 1 tablet by mouth every night at bedtime.    Chronic obstructive pulmonary disease, unspecified COPD type (CMS/[facility])  -     albuterol sulfate  (90 Base) MCG/ACT inhaler; Inhale 2 puffs Every 4 (Four) Hours As Needed for Wheezing.    Other constipation    Gastroesophageal reflux disease without esophagitis  -     CBC & Differential  -     CBC Auto Differential    Morbidly obese (CMS/[facility])    Chronic low back pain without sciatica, unspecified back pain laterality    Carpal tunnel syndrome of right wrist    Abnormal glucose level    Anxiety  -     hydrOXYzine (ATARAX) 25 MG tablet; take 1 tablet by mouth at bedtime if needed  -     buPROPion XL (WELLBUTRIN XL) 150 MG 24 hr tablet; Take 1 tablet by mouth Daily.    Depression, unspecified depression type  -     buPROPion XL (WELLBUTRIN XL) 150 MG 24 hr tablet; Take 1 tablet by mouth Daily.    Glaucoma of both eyes, unspecified glaucoma type    Other insomnia    Seasonal allergies  -     montelukast (SINGULAIR) 10 MG tablet; Take 1 tablet by mouth Every Night.    Insomnia, unspecified type  -     traZODone (DESYREL) 50 MG tablet; Take 1 tablet by mouth Every Night.    Acute vaginitis  -     terconazole (TERAZOL 3) 0.8 % vaginal cream; Insert 1 applicator into the vagina Every Night.    Snores  -     Ambulatory Referral to Sleep Medicine    recheck 3 months fasting    BS stable today in office -add singulair.  Chronic  problems stable,  Increase ace to 30mg due to uncontrolled bp  The patient needs  Shingrix and encouraged to check with pharmacy 3/19 for availability    Return in about 3 months (around 4/14/2019) for fasting, Next scheduled follow up.  RTC/call  If symptoms worsen  Meds MOA and SE's reviewed and pt v/u

## 2019-01-14 ENCOUNTER — OFFICE VISIT (OUTPATIENT)
Dept: INTERNAL MEDICINE | Facility: CLINIC | Age: 54
End: 2019-01-14

## 2019-01-14 VITALS
BODY MASS INDEX: 45.91 KG/M2 | DIASTOLIC BLOOD PRESSURE: 80 MMHG | HEART RATE: 60 BPM | WEIGHT: 239 LBS | TEMPERATURE: 97.1 F | RESPIRATION RATE: 16 BRPM | SYSTOLIC BLOOD PRESSURE: 158 MMHG

## 2019-01-14 DIAGNOSIS — R73.09 ABNORMAL GLUCOSE LEVEL: ICD-10-CM

## 2019-01-14 DIAGNOSIS — F32.A DEPRESSION, UNSPECIFIED DEPRESSION TYPE: ICD-10-CM

## 2019-01-14 DIAGNOSIS — J30.89 SEASONAL ALLERGIC RHINITIS DUE TO OTHER ALLERGIC TRIGGER: ICD-10-CM

## 2019-01-14 DIAGNOSIS — E78.5 HYPERLIPIDEMIA, UNSPECIFIED HYPERLIPIDEMIA TYPE: ICD-10-CM

## 2019-01-14 DIAGNOSIS — G56.01 CARPAL TUNNEL SYNDROME OF RIGHT WRIST: ICD-10-CM

## 2019-01-14 DIAGNOSIS — R06.83 SNORES: ICD-10-CM

## 2019-01-14 DIAGNOSIS — F41.9 ANXIETY: ICD-10-CM

## 2019-01-14 DIAGNOSIS — I10 ESSENTIAL HYPERTENSION: ICD-10-CM

## 2019-01-14 DIAGNOSIS — M54.50 CHRONIC LOW BACK PAIN WITHOUT SCIATICA, UNSPECIFIED BACK PAIN LATERALITY: ICD-10-CM

## 2019-01-14 DIAGNOSIS — G47.00 INSOMNIA, UNSPECIFIED TYPE: ICD-10-CM

## 2019-01-14 DIAGNOSIS — J44.9 CHRONIC OBSTRUCTIVE PULMONARY DISEASE, UNSPECIFIED COPD TYPE (HCC): ICD-10-CM

## 2019-01-14 DIAGNOSIS — N76.0 ACUTE VAGINITIS: ICD-10-CM

## 2019-01-14 DIAGNOSIS — H40.9 GLAUCOMA OF BOTH EYES, UNSPECIFIED GLAUCOMA TYPE: ICD-10-CM

## 2019-01-14 DIAGNOSIS — K21.9 GASTROESOPHAGEAL REFLUX DISEASE WITHOUT ESOPHAGITIS: ICD-10-CM

## 2019-01-14 DIAGNOSIS — K59.09 OTHER CONSTIPATION: ICD-10-CM

## 2019-01-14 DIAGNOSIS — G47.09 OTHER INSOMNIA: ICD-10-CM

## 2019-01-14 DIAGNOSIS — G89.29 CHRONIC LOW BACK PAIN WITHOUT SCIATICA, UNSPECIFIED BACK PAIN LATERALITY: ICD-10-CM

## 2019-01-14 DIAGNOSIS — E78.5 HYPERLIPIDEMIA, UNSPECIFIED HYPERLIPIDEMIA TYPE: Primary | ICD-10-CM

## 2019-01-14 DIAGNOSIS — E66.01 MORBIDLY OBESE (HCC): ICD-10-CM

## 2019-01-14 DIAGNOSIS — J30.2 SEASONAL ALLERGIES: ICD-10-CM

## 2019-01-14 LAB
ALBUMIN SERPL-MCNC: 4.2 G/DL (ref 3.2–4.8)
ALBUMIN/GLOB SERPL: 2.1 G/DL (ref 1.5–2.5)
ALP SERPL-CCNC: 76 U/L (ref 25–100)
ALT SERPL W P-5'-P-CCNC: 40 U/L (ref 7–40)
ANION GAP SERPL CALCULATED.3IONS-SCNC: 3 MMOL/L (ref 3–11)
ARTICHOKE IGE QN: 80 MG/DL (ref 0–130)
AST SERPL-CCNC: 23 U/L (ref 0–33)
BASOPHILS # BLD AUTO: 0.02 10*3/MM3 (ref 0–0.2)
BASOPHILS NFR BLD AUTO: 0.3 % (ref 0–1)
BILIRUB SERPL-MCNC: 0.2 MG/DL (ref 0.3–1.2)
BUN BLD-MCNC: 18 MG/DL (ref 9–23)
BUN/CREAT SERPL: 31 (ref 7–25)
CALCIUM SPEC-SCNC: 9.1 MG/DL (ref 8.7–10.4)
CHLORIDE SERPL-SCNC: 105 MMOL/L (ref 99–109)
CHOLEST SERPL-MCNC: 133 MG/DL (ref 0–200)
CO2 SERPL-SCNC: 31 MMOL/L (ref 20–31)
CREAT BLD-MCNC: 0.58 MG/DL (ref 0.6–1.3)
DEPRECATED RDW RBC AUTO: 44.8 FL (ref 37–54)
EOSINOPHIL # BLD AUTO: 0.16 10*3/MM3 (ref 0–0.3)
EOSINOPHIL NFR BLD AUTO: 2.3 % (ref 0–3)
ERYTHROCYTE [DISTWIDTH] IN BLOOD BY AUTOMATED COUNT: 14.5 % (ref 11.3–14.5)
GFR SERPL CREATININE-BSD FRML MDRD: 108 ML/MIN/1.73
GLOBULIN UR ELPH-MCNC: 2 GM/DL
GLUCOSE BLD-MCNC: 112 MG/DL (ref 70–100)
HCT VFR BLD AUTO: 38.9 % (ref 34.5–44)
HDLC SERPL-MCNC: 39 MG/DL (ref 40–60)
HGB BLD-MCNC: 12.1 G/DL (ref 11.5–15.5)
IMM GRANULOCYTES # BLD AUTO: 0.03 10*3/MM3 (ref 0–0.03)
IMM GRANULOCYTES NFR BLD AUTO: 0.4 % (ref 0–0.6)
LYMPHOCYTES # BLD AUTO: 2.48 10*3/MM3 (ref 0.6–4.8)
LYMPHOCYTES NFR BLD AUTO: 35.4 % (ref 24–44)
MCH RBC QN AUTO: 26.8 PG (ref 27–31)
MCHC RBC AUTO-ENTMCNC: 31.1 G/DL (ref 32–36)
MCV RBC AUTO: 86.3 FL (ref 80–99)
MONOCYTES # BLD AUTO: 0.59 10*3/MM3 (ref 0–1)
MONOCYTES NFR BLD AUTO: 8.4 % (ref 0–12)
NEUTROPHILS # BLD AUTO: 3.75 10*3/MM3 (ref 1.5–8.3)
NEUTROPHILS NFR BLD AUTO: 53.6 % (ref 41–71)
PLATELET # BLD AUTO: 175 10*3/MM3 (ref 150–450)
PMV BLD AUTO: 13.5 FL (ref 6–12)
POTASSIUM BLD-SCNC: 4.3 MMOL/L (ref 3.5–5.5)
PROT SERPL-MCNC: 6.2 G/DL (ref 5.7–8.2)
RBC # BLD AUTO: 4.51 10*6/MM3 (ref 3.89–5.14)
SODIUM BLD-SCNC: 139 MMOL/L (ref 132–146)
TRIGL SERPL-MCNC: 140 MG/DL (ref 0–150)
TSH SERPL DL<=0.05 MIU/L-ACNC: 3.98 MIU/ML (ref 0.35–5.35)
WBC NRBC COR # BLD: 7 10*3/MM3 (ref 3.5–10.8)

## 2019-01-14 PROCEDURE — 80061 LIPID PANEL: CPT | Performed by: NURSE PRACTITIONER

## 2019-01-14 PROCEDURE — 80053 COMPREHEN METABOLIC PANEL: CPT | Performed by: NURSE PRACTITIONER

## 2019-01-14 PROCEDURE — 99214 OFFICE O/P EST MOD 30 MIN: CPT | Performed by: NURSE PRACTITIONER

## 2019-01-14 PROCEDURE — 84443 ASSAY THYROID STIM HORMONE: CPT | Performed by: NURSE PRACTITIONER

## 2019-01-14 PROCEDURE — 85025 COMPLETE CBC W/AUTO DIFF WBC: CPT | Performed by: NURSE PRACTITIONER

## 2019-01-14 RX ORDER — HYDROXYZINE HYDROCHLORIDE 25 MG/1
TABLET, FILM COATED ORAL
Qty: 30 TABLET | Refills: 1 | Status: SHIPPED | OUTPATIENT
Start: 2019-01-14 | End: 2019-04-08 | Stop reason: SDUPTHER

## 2019-01-14 RX ORDER — LISINOPRIL 20 MG/1
TABLET ORAL
Qty: 30 TABLET | Refills: 1 | Status: CANCELLED | OUTPATIENT
Start: 2019-01-14

## 2019-01-14 RX ORDER — BUPROPION HYDROCHLORIDE 150 MG/1
150 TABLET ORAL DAILY
Qty: 30 TABLET | Refills: 2 | Status: SHIPPED | OUTPATIENT
Start: 2019-01-14 | End: 2019-04-17 | Stop reason: SDUPTHER

## 2019-01-14 RX ORDER — LISINOPRIL 30 MG/1
30 TABLET ORAL
Qty: 30 TABLET | Refills: 3 | Status: SHIPPED | OUTPATIENT
Start: 2019-01-14 | End: 2019-06-26 | Stop reason: SDUPTHER

## 2019-01-14 RX ORDER — TRAZODONE HYDROCHLORIDE 50 MG/1
50 TABLET ORAL NIGHTLY
Qty: 30 TABLET | Refills: 2 | Status: SHIPPED | OUTPATIENT
Start: 2019-01-14 | End: 2019-04-28 | Stop reason: SDUPTHER

## 2019-01-14 RX ORDER — MONTELUKAST SODIUM 10 MG/1
10 TABLET ORAL NIGHTLY
Qty: 30 TABLET | Refills: 5 | Status: SHIPPED | OUTPATIENT
Start: 2019-01-14 | End: 2019-04-15

## 2019-01-14 RX ORDER — ALBUTEROL SULFATE 90 UG/1
2 AEROSOL, METERED RESPIRATORY (INHALATION) EVERY 4 HOURS PRN
Qty: 1 INHALER | Refills: 0 | Status: SHIPPED | OUTPATIENT
Start: 2019-01-14 | End: 2022-05-16 | Stop reason: SDUPTHER

## 2019-01-15 ENCOUNTER — TELEPHONE (OUTPATIENT)
Dept: INTERNAL MEDICINE | Facility: CLINIC | Age: 54
End: 2019-01-15

## 2019-01-15 RX ORDER — SIMVASTATIN 20 MG
TABLET ORAL
Qty: 30 TABLET | Refills: 1 | Status: SHIPPED | OUTPATIENT
Start: 2019-01-15 | End: 2019-04-08 | Stop reason: SDUPTHER

## 2019-01-15 RX ORDER — TIOTROPIUM BROMIDE 18 UG/1
CAPSULE ORAL; RESPIRATORY (INHALATION)
Qty: 30 CAPSULE | Refills: 1 | Status: SHIPPED | OUTPATIENT
Start: 2019-01-15 | End: 2019-04-15

## 2019-01-15 RX ORDER — FLUTICASONE PROPIONATE 50 MCG
SPRAY, SUSPENSION (ML) NASAL
Qty: 16 G | Refills: 1 | Status: SHIPPED | OUTPATIENT
Start: 2019-01-15 | End: 2019-04-08 | Stop reason: SDUPTHER

## 2019-01-15 NOTE — TELEPHONE ENCOUNTER
----- Message from ROMI Penny sent at 1/14/2019 10:14 PM EST -----  Let pharmacy and pt know I would like to increase 30mg due to elevated bp at visit today.

## 2019-01-16 ENCOUNTER — TELEPHONE (OUTPATIENT)
Dept: INTERNAL MEDICINE | Facility: CLINIC | Age: 54
End: 2019-01-16

## 2019-01-16 NOTE — TELEPHONE ENCOUNTER
Late entry:  I called [facility] pharmacy to check on the patient's sertraline dosage because we received external information from them that the patient was taking 50mg daily.  Patient stated she should be taking 100mg at her appointment on monday and was unsure why her dosage had changed.  I confirmed with Jamee Leo that the patient should be taking sertraline 100mg daily.  I spoke to the pharmacist at [facility] and he said he received a refill request from our office on 11-14-18 for sertraline 50mg.  I have searched the chart and cannot find a message or script showing a refill date of 11-14-18.  Lizette sent in a refill yesterday for 50mg because we received a refill request from the pharmacy.  I informed the pharmacist to cancel the prescription for sertraline 50mg because the patient should be taking 100mg daily.  The pharmacist stated that the patient just picked up a prescription for 50mg and 100mg's.  I called the patient last night and informed her she can take 2 of the 50mg tablets and use that prescription up and then begin taking 1 of the 100mg tablets after that but she should not be taking both prescriptions together.  Patient verbalized understanding and appreciation.

## 2019-01-17 NOTE — TELEPHONE ENCOUNTER
Let pt no like to increase her list lisinopril to 30 mg on her blood pressure readings and office.

## 2019-02-07 DIAGNOSIS — F41.9 ANXIETY: ICD-10-CM

## 2019-02-07 DIAGNOSIS — F32.A DEPRESSION, UNSPECIFIED DEPRESSION TYPE: ICD-10-CM

## 2019-02-07 DIAGNOSIS — I10 ESSENTIAL HYPERTENSION: ICD-10-CM

## 2019-02-07 RX ORDER — LISINOPRIL 20 MG/1
TABLET ORAL
Qty: 30 TABLET | Refills: 1 | Status: SHIPPED | OUTPATIENT
Start: 2019-02-07 | End: 2019-04-15

## 2019-02-08 ENCOUNTER — TELEPHONE (OUTPATIENT)
Dept: INTERNAL MEDICINE | Facility: CLINIC | Age: 54
End: 2019-02-08

## 2019-02-13 DIAGNOSIS — K21.9 GASTROESOPHAGEAL REFLUX DISEASE WITHOUT ESOPHAGITIS: ICD-10-CM

## 2019-02-13 RX ORDER — LANSOPRAZOLE 30 MG/1
CAPSULE, DELAYED RELEASE ORAL
Qty: 30 CAPSULE | Refills: 0 | Status: SHIPPED | OUTPATIENT
Start: 2019-02-13 | End: 2019-03-25 | Stop reason: SDUPTHER

## 2019-02-19 NOTE — TELEPHONE ENCOUNTER
Please have the patient call her insurance and see which prescription they will pay for since they will not pay for delay

## 2019-02-19 NOTE — TELEPHONE ENCOUNTER
Patient stated that she doesn't think she needs this inhaler anymore because she has the Spiriva and it seems to be doing a good job. She said that she will call her insurance and see though in case she happens to need it in the future.

## 2019-02-27 DIAGNOSIS — K59.00 CONSTIPATION, UNSPECIFIED CONSTIPATION TYPE: ICD-10-CM

## 2019-02-28 RX ORDER — LINACLOTIDE 72 UG/1
CAPSULE, GELATIN COATED ORAL
Qty: 30 CAPSULE | Refills: 0 | Status: SHIPPED | OUTPATIENT
Start: 2019-02-28 | End: 2019-09-17 | Stop reason: SDUPTHER

## 2019-03-11 ENCOUNTER — TELEPHONE (OUTPATIENT)
Dept: INTERNAL MEDICINE | Facility: CLINIC | Age: 54
End: 2019-03-11

## 2019-03-11 RX ORDER — DICYCLOMINE HYDROCHLORIDE 10 MG/1
10 CAPSULE ORAL
Qty: 90 CAPSULE | Refills: 1 | Status: SHIPPED | OUTPATIENT
Start: 2019-03-11 | End: 2019-04-15

## 2019-03-14 DIAGNOSIS — J44.9 CHRONIC OBSTRUCTIVE PULMONARY DISEASE, UNSPECIFIED COPD TYPE (HCC): ICD-10-CM

## 2019-03-14 DIAGNOSIS — F41.9 ANXIETY: ICD-10-CM

## 2019-03-14 DIAGNOSIS — J30.89 SEASONAL ALLERGIC RHINITIS DUE TO OTHER ALLERGIC TRIGGER: ICD-10-CM

## 2019-03-14 DIAGNOSIS — I10 ESSENTIAL HYPERTENSION: ICD-10-CM

## 2019-03-14 DIAGNOSIS — E78.5 HYPERLIPIDEMIA, UNSPECIFIED HYPERLIPIDEMIA TYPE: ICD-10-CM

## 2019-03-14 DIAGNOSIS — F32.A DEPRESSION, UNSPECIFIED DEPRESSION TYPE: ICD-10-CM

## 2019-03-18 RX ORDER — LISINOPRIL 20 MG/1
TABLET ORAL
Qty: 30 TABLET | Refills: 1 | OUTPATIENT
Start: 2019-03-18

## 2019-03-18 RX ORDER — HYDROXYZINE HYDROCHLORIDE 25 MG/1
TABLET, FILM COATED ORAL
Qty: 30 TABLET | Refills: 1 | OUTPATIENT
Start: 2019-03-18

## 2019-03-18 RX ORDER — SIMVASTATIN 20 MG
TABLET ORAL
Qty: 30 TABLET | Refills: 1 | OUTPATIENT
Start: 2019-03-18

## 2019-03-18 RX ORDER — FLUTICASONE PROPIONATE 50 MCG
SPRAY, SUSPENSION (ML) NASAL
Qty: 16 G | Refills: 1 | OUTPATIENT
Start: 2019-03-18

## 2019-03-18 RX ORDER — GABAPENTIN 400 MG/1
CAPSULE ORAL
Qty: 90 CAPSULE | Refills: 2 | OUTPATIENT
Start: 2019-03-18

## 2019-03-18 RX ORDER — TIOTROPIUM BROMIDE 18 UG/1
CAPSULE ORAL; RESPIRATORY (INHALATION)
Qty: 30 CAPSULE | Refills: 1 | OUTPATIENT
Start: 2019-03-18

## 2019-03-25 DIAGNOSIS — K21.9 GASTROESOPHAGEAL REFLUX DISEASE WITHOUT ESOPHAGITIS: ICD-10-CM

## 2019-03-25 RX ORDER — LANSOPRAZOLE 30 MG/1
CAPSULE, DELAYED RELEASE ORAL
Qty: 30 CAPSULE | Refills: 0 | Status: SHIPPED | OUTPATIENT
Start: 2019-03-25 | End: 2019-10-21 | Stop reason: SDUPTHER

## 2019-04-01 ENCOUNTER — PRIOR AUTHORIZATION (OUTPATIENT)
Dept: INTERNAL MEDICINE | Facility: CLINIC | Age: 54
End: 2019-04-01

## 2019-04-01 NOTE — TELEPHONE ENCOUNTER
LARRY BROWN (Key: [id number]) Dulera    This request has received an Unfavorable outcome.  An eAppeal may be available. View the bottom of the request to see if an eAppeal is available along with any additional information provided by [facility].      Please advise?

## 2019-04-01 NOTE — TELEPHONE ENCOUNTER
Spoke with patient, she states that she will call her ins company and let us know what is covered.

## 2019-04-08 DIAGNOSIS — F41.9 ANXIETY: ICD-10-CM

## 2019-04-08 DIAGNOSIS — J44.9 CHRONIC OBSTRUCTIVE PULMONARY DISEASE, UNSPECIFIED COPD TYPE (HCC): ICD-10-CM

## 2019-04-08 DIAGNOSIS — E78.5 HYPERLIPIDEMIA, UNSPECIFIED HYPERLIPIDEMIA TYPE: ICD-10-CM

## 2019-04-08 DIAGNOSIS — I10 ESSENTIAL HYPERTENSION: ICD-10-CM

## 2019-04-08 DIAGNOSIS — J30.89 SEASONAL ALLERGIC RHINITIS DUE TO OTHER ALLERGIC TRIGGER: ICD-10-CM

## 2019-04-08 DIAGNOSIS — F32.A DEPRESSION, UNSPECIFIED DEPRESSION TYPE: ICD-10-CM

## 2019-04-15 ENCOUNTER — TELEPHONE (OUTPATIENT)
Dept: INTERNAL MEDICINE | Facility: CLINIC | Age: 54
End: 2019-04-15

## 2019-04-15 ENCOUNTER — OFFICE VISIT (OUTPATIENT)
Dept: INTERNAL MEDICINE | Facility: CLINIC | Age: 54
End: 2019-04-15

## 2019-04-15 VITALS
WEIGHT: 242.2 LBS | OXYGEN SATURATION: 97 % | DIASTOLIC BLOOD PRESSURE: 74 MMHG | RESPIRATION RATE: 20 BRPM | SYSTOLIC BLOOD PRESSURE: 128 MMHG | HEIGHT: 61 IN | TEMPERATURE: 97.5 F | HEART RATE: 83 BPM | BODY MASS INDEX: 45.73 KG/M2

## 2019-04-15 DIAGNOSIS — K21.9 GASTROESOPHAGEAL REFLUX DISEASE WITHOUT ESOPHAGITIS: ICD-10-CM

## 2019-04-15 DIAGNOSIS — K59.09 OTHER CONSTIPATION: ICD-10-CM

## 2019-04-15 DIAGNOSIS — J32.9 SINUSITIS, UNSPECIFIED CHRONICITY, UNSPECIFIED LOCATION: ICD-10-CM

## 2019-04-15 DIAGNOSIS — G47.09 OTHER INSOMNIA: ICD-10-CM

## 2019-04-15 DIAGNOSIS — I10 ESSENTIAL HYPERTENSION: Primary | ICD-10-CM

## 2019-04-15 DIAGNOSIS — J30.2 SEASONAL ALLERGIES: ICD-10-CM

## 2019-04-15 DIAGNOSIS — M54.50 CHRONIC LOW BACK PAIN WITHOUT SCIATICA, UNSPECIFIED BACK PAIN LATERALITY: ICD-10-CM

## 2019-04-15 DIAGNOSIS — H40.9 GLAUCOMA OF BOTH EYES, UNSPECIFIED GLAUCOMA TYPE: ICD-10-CM

## 2019-04-15 DIAGNOSIS — J44.9 CHRONIC OBSTRUCTIVE PULMONARY DISEASE, UNSPECIFIED COPD TYPE (HCC): ICD-10-CM

## 2019-04-15 DIAGNOSIS — R82.998 LEUKOCYTES IN URINE: ICD-10-CM

## 2019-04-15 DIAGNOSIS — F41.9 ANXIETY: ICD-10-CM

## 2019-04-15 DIAGNOSIS — F32.A DEPRESSION, UNSPECIFIED DEPRESSION TYPE: ICD-10-CM

## 2019-04-15 DIAGNOSIS — E66.01 MORBIDLY OBESE (HCC): ICD-10-CM

## 2019-04-15 DIAGNOSIS — L08.9 SKIN INFECTION: ICD-10-CM

## 2019-04-15 DIAGNOSIS — E78.5 HYPERLIPIDEMIA, UNSPECIFIED HYPERLIPIDEMIA TYPE: ICD-10-CM

## 2019-04-15 DIAGNOSIS — G89.29 CHRONIC LOW BACK PAIN WITHOUT SCIATICA, UNSPECIFIED BACK PAIN LATERALITY: ICD-10-CM

## 2019-04-15 LAB
ALBUMIN SERPL-MCNC: 4.3 G/DL (ref 3.5–5.2)
ALBUMIN/GLOB SERPL: 1.3 G/DL
ALP SERPL-CCNC: 91 U/L (ref 39–117)
ALT SERPL W P-5'-P-CCNC: 42 U/L (ref 1–33)
ANION GAP SERPL CALCULATED.3IONS-SCNC: 15.3 MMOL/L
AST SERPL-CCNC: 31 U/L (ref 1–32)
BILIRUB BLD-MCNC: NEGATIVE MG/DL
BILIRUB SERPL-MCNC: 0.2 MG/DL (ref 0.2–1.2)
BUN BLD-MCNC: 11 MG/DL (ref 6–20)
BUN/CREAT SERPL: 18 (ref 7–25)
CALCIUM SPEC-SCNC: 9.5 MG/DL (ref 8.6–10.5)
CHLORIDE SERPL-SCNC: 100 MMOL/L (ref 98–107)
CHOLEST SERPL-MCNC: 178 MG/DL (ref 0–200)
CLARITY, POC: ABNORMAL
CO2 SERPL-SCNC: 26.7 MMOL/L (ref 22–29)
COLOR UR: YELLOW
CREAT BLD-MCNC: 0.61 MG/DL (ref 0.57–1)
DEPRECATED RDW RBC AUTO: 42.1 FL (ref 37–54)
ERYTHROCYTE [DISTWIDTH] IN BLOOD BY AUTOMATED COUNT: 13.4 % (ref 12.3–15.4)
EXPIRATION DATE: ABNORMAL
GFR SERPL CREATININE-BSD FRML MDRD: 102 ML/MIN/1.73
GLOBULIN UR ELPH-MCNC: 3.3 GM/DL
GLUCOSE BLD-MCNC: 111 MG/DL (ref 65–99)
GLUCOSE UR STRIP-MCNC: NEGATIVE MG/DL
HCT VFR BLD AUTO: 43.5 % (ref 34–46.6)
HDLC SERPL-MCNC: 54 MG/DL (ref 40–60)
HGB BLD-MCNC: 13.6 G/DL (ref 12–15.9)
KETONES UR QL: NEGATIVE
LDLC SERPL CALC-MCNC: 92 MG/DL (ref 0–100)
LDLC/HDLC SERPL: 1.71 {RATIO}
LEUKOCYTE EST, POC: ABNORMAL
Lab: ABNORMAL
MCH RBC QN AUTO: 26.8 PG (ref 26.6–33)
MCHC RBC AUTO-ENTMCNC: 31.3 G/DL (ref 31.5–35.7)
MCV RBC AUTO: 85.6 FL (ref 79–97)
NITRITE UR-MCNC: NEGATIVE MG/ML
PH UR: 8 [PH] (ref 5–8)
PLATELET # BLD AUTO: 149 10*3/MM3 (ref 140–450)
PMV BLD AUTO: 14.6 FL (ref 6–12)
POTASSIUM BLD-SCNC: 4.8 MMOL/L (ref 3.5–5.2)
PROT SERPL-MCNC: 7.6 G/DL (ref 6–8.5)
PROT UR STRIP-MCNC: NEGATIVE MG/DL
RBC # BLD AUTO: 5.08 10*6/MM3 (ref 3.77–5.28)
RBC # UR STRIP: NEGATIVE /UL
SODIUM BLD-SCNC: 142 MMOL/L (ref 136–145)
SP GR UR: 1.01 (ref 1–1.03)
TRIGL SERPL-MCNC: 159 MG/DL (ref 0–150)
TSH SERPL DL<=0.05 MIU/L-ACNC: 6.26 MIU/ML (ref 0.27–4.2)
UROBILINOGEN UR QL: NORMAL
VLDLC SERPL-MCNC: 31.8 MG/DL (ref 5–40)
WBC NRBC COR # BLD: 6.53 10*3/MM3 (ref 3.4–10.8)

## 2019-04-15 PROCEDURE — 80053 COMPREHEN METABOLIC PANEL: CPT | Performed by: NURSE PRACTITIONER

## 2019-04-15 PROCEDURE — 87086 URINE CULTURE/COLONY COUNT: CPT | Performed by: NURSE PRACTITIONER

## 2019-04-15 PROCEDURE — 84443 ASSAY THYROID STIM HORMONE: CPT | Performed by: NURSE PRACTITIONER

## 2019-04-15 PROCEDURE — 85027 COMPLETE CBC AUTOMATED: CPT | Performed by: NURSE PRACTITIONER

## 2019-04-15 PROCEDURE — 80061 LIPID PANEL: CPT | Performed by: NURSE PRACTITIONER

## 2019-04-15 PROCEDURE — 99214 OFFICE O/P EST MOD 30 MIN: CPT | Performed by: NURSE PRACTITIONER

## 2019-04-15 RX ORDER — POLYETHYLENE GLYCOL 3350 17 G/17G
17 POWDER, FOR SOLUTION ORAL DAILY
Qty: 72 PACKET | Refills: 2 | Status: SHIPPED | OUTPATIENT
Start: 2019-04-15 | End: 2021-03-08 | Stop reason: SDUPTHER

## 2019-04-15 RX ORDER — AZITHROMYCIN 250 MG/1
TABLET, FILM COATED ORAL
Qty: 6 TABLET | Refills: 0 | Status: SHIPPED | OUTPATIENT
Start: 2019-04-15 | End: 2019-07-23

## 2019-04-15 NOTE — PATIENT INSTRUCTIONS
MyPlate from USDA  MyPlate is an outline of a general healthy diet based on the 2010 Dietary Guidelines for Americans, from the U.S. Department of Agriculture (USDA). It sets guidelines for how much food you should eat from each food group based on your age, sex, and level of physical activity.  What are tips for following MyPlate?  To follow MyPlate recommendations:  · Eat a wide variety of fruits and vegetables, grains, and protein foods.  · Serve smaller portions and eat less food throughout the day.  · Limit portion sizes to avoid overeating.  · Enjoy your food.  · Get at least 150 minutes of exercise every week. This is about 30 minutes each day, 5 or more days per week.    It can be difficult to have every meal look like MyPlate. Think about MyPlate as eating guidelines for an entire day, rather than each individual meal.  Fruits and Vegetables  · Make half of your plate fruits and vegetables.  · Eat many different colors of fruits and vegetables each day.  · For a 2,000 calorie daily food plan, eat:  ? 2½ cups of vegetables every day.  ? 2 cups of fruit every day.  · 1 cup is equal to:  ? 1 cup raw or cooked vegetables.  ? 1 cup raw fruit.  ? 1 medium-sized orange, apple, or banana.  ? 1 cup 100% fruit or vegetable juice.  ? 2 cups raw leafy greens, such as lettuce, spinach, or kale.  ? ½ cup dried fruit.  Grains  · One fourth of your plate should be grains.  · Make at least half of the grains you eat each day whole grains.  · For a 2,000 calorie daily food plan, eat 6 oz of grains every day.  · 1 oz is equal to:  ? 1 slice bread.  ? 1 cup cereal.  ? ½ cup cooked rice, cereal, or pasta.  Protein  · One fourth of your plate should be protein.  · Eat a wide variety of protein foods, including meat, poultry, fish, eggs, beans, nuts, and tofu.  · For a 2,000 calorie daily food plan, eat 5½ oz of protein every day.  · 1 oz is equal to:  ? 1 oz meat, poultry, or fish.  ? ¼ cup cooked beans.  ? 1 egg.  ? ½ oz nuts  or seeds.  ? 1 Tbsp peanut butter.  Dairy  · Drink fat-free or low-fat (1%) milk.  · Eat or drink dairy as a side to meals.  · For a 2,000 calorie daily food plan, eat or drink 3 cups of dairy every day.  · 1 cup is equal to:  ? 1 cup milk, yogurt, cottage cheese, or soy milk (soy beverage).  ? 2 oz processed cheese.  ? 1½ oz natural cheese.  Fats, oils, salt, and sugars  · Only small amounts of oils are recommended.  · Avoid foods that are high in calories and low in nutritional value (empty calories), like foods high in fat or added sugars.  · Choose foods that are low in salt (sodium). Choose foods that have less than 140 milligrams (mg) of sodium per serving.  · Drink water instead of sugary drinks. Drink enough water each day to keep your urine pale yellow.  Where to find support  · Work with your health care provider or a nutrition specialist (dietitian) to develop a customized eating plan that is right for you.  · Download an farhan (mobile application) to help you track your daily food intake.  Where to find more information  · Go to ChooseMyPlate.gov for more information.  · Learn more and log your daily food intake according to MyPlate using USDA's SuperTracker: www.[employer]stream Mediaer.usda.gov  Summary  · MyPlate is a general guideline for healthy eating from the USDA. It is based on the 2010 Dietary Guidelines for Americans.  · In general, fruits and vegetables should take up ½ of your plate, grains should take up ¼ of your plate, and protein should take up ¼ of your plate.  This information is not intended to replace advice given to you by your health care provider. Make sure you discuss any questions you have with your health care provider.  Document Released: 01/06/2009 Document Revised: 03/19/2018 Document Reviewed: 03/19/2018  [employer] Interactive Patient Education © 2019 [employer]

## 2019-04-15 NOTE — TELEPHONE ENCOUNTER
Called and spoke to KATEY at [facility] pharmacy. Instructed ok to switch to regular bactroban per provider. Voiced understanding.

## 2019-04-15 NOTE — TELEPHONE ENCOUNTER
----- Message from Jayden Nelson sent at 4/15/2019 12:48 PM EDT -----  Contact: [facility] Pharmacist   Med change request for mupirocin (BACTROBAN NASAL) 2 % nasal ointment from [facility] in [facility], Reason for change because the medication isn't covered under her insurance. Pharmacy would like to change it to the plain BACTROBAN OINTMENT which is covered. Pharmacy needs permission to do so. Please give a  Call back at  [phone]

## 2019-04-15 NOTE — PROGRESS NOTES
Chief Complaint   Patient presents with   • Hypertension     follow up    • COPD     follow up concerned about productive cough with green sputum x1wk        Subjective     History of Present Illness     The patient is here today for chronic fasting follow-up visit.  Patient has hypertension taking lisinopril today's blood pressure is improved.  She is up in weight 3#.   The patient has no way of  taking blood pressure at home.  Last blood pressure reading in the office was elevated.      Patient has GERD taking Prevacid daily.  She has tried Zantac tums pepcid and rolaids and this does not help.     Patient has hyperlipidemia takes simvastatin at bedtime without abdominal pain or myalgias. She is thinking of trying to diet and she is walking outside now some. She is planting flowers for increased activity.     Patient has insomnia takes trazodone at bedtime.she sleeps but does not stay asleep.  She snores and she was supposed to have clint evaluation but could not get there due to transportation.   Was due this past Friday.     Patient has constipation takes Linzess.  Takes fiber prn --sometimes she forgets uses linzess and eats greens and she still has constipation.  Has not used miralax.       The patient has allergies that are seasonal and has  Montelukast as well as Flonase and Claritin 10 mg over the counter. But only takes prn she has sores  In nose.      Patient has COPD and takes Dulera   Her last use of albuterol--she is using the albuterol 10 x per month    The patient has anxiety depression and takes Zoloft as well as Wellbutrin.  No SI no HI.  Her symptoms are not exacerbated.    Chronic back pain without exacerbation.  At times skin sensitive. She did PT 6 mo ago for back.  Now a little better.  Taking Mobic as needed.    Patient has glaucoma-  Last ophthalmology visit- one month ago           The following portions of the patient's history were reviewed and updated as appropriate: allergies, current  medications, past family history, past medical history, past social history, past surgical history and problem list.    Review of Systems   Constitutional: Negative for activity change, appetite change, chills, fatigue and fever.   HENT: Positive for congestion and postnasal drip. Negative for sore throat.         Sores in nose   Eyes: Negative for visual disturbance.   Respiratory: Negative for cough and shortness of breath.    Cardiovascular: Negative for chest pain, palpitations and leg swelling.   Gastrointestinal: Positive for constipation. Negative for abdominal pain, diarrhea, nausea and GERD.   Musculoskeletal: Positive for back pain. Negative for arthralgias and myalgias.   Skin: Negative for rash.   Allergic/Immunologic: Negative for environmental allergies.   Neurological: Negative for dizziness.   Psychiatric/Behavioral: Negative for sleep disturbance.   All other systems reviewed and are negative.      Objective   Physical Exam   Constitutional: She is oriented to person, place, and time. She appears well-developed and well-nourished.   HENT:   Head: Normocephalic and atraumatic.   Right Ear: External ear normal.   Left Ear: External ear normal.   Nasal mucosa edema with clear postnasal drainage there is no exudate edema or erythema.  She does have a few scabbed areas noted in her nose bilaterally.   Eyes: Conjunctivae are normal.   Neck: No thyromegaly present.   Cardiovascular: Normal rate, regular rhythm and intact distal pulses.   Pulmonary/Chest: Effort normal and breath sounds normal.   Abdominal: Soft. Bowel sounds are normal. She exhibits no distension. There is no tenderness.   Musculoskeletal: She exhibits no edema.   Lymphadenopathy:     She has no cervical adenopathy.   Neurological: She is alert and oriented to person, place, and time.   Skin: Capillary refill takes 2 to 3 seconds.   Psychiatric: She has a normal mood and affect. Her behavior is normal.   Nursing note and vitals  reviewed.            Assessment/Plan   Adrianne was seen today for hypertension and copd.    Diagnoses and all orders for this visit:    Essential hypertension  -     Cancel: Comprehensive Metabolic Panel; Future  -     TSH; Future  -     POC Urinalysis Dipstick, Automated; Future  -     Comprehensive Metabolic Panel  -     TSH  -     POC Urinalysis Dipstick, Automated    Hyperlipidemia, unspecified hyperlipidemia type  -     Cancel: Comprehensive Metabolic Panel; Future  -     Lipid Panel; Future  -     Lipid Panel    Chronic obstructive pulmonary disease, unspecified COPD type (CMS/HCC)    Other constipation    Glaucoma of both eyes, unspecified glaucoma type    Anxiety    Other insomnia    Chronic low back pain without sciatica, unspecified back pain laterality    Seasonal allergies  -     CBC (No Diff)    Morbidly obese (CMS/HCC)  -     TSH; Future  -     TSH    Depression, unspecified depression type    Gastroesophageal reflux disease without esophagitis  -     Cancel: CBC & Differential; Future  -     Cancel: Comprehensive Metabolic Panel; Future    Skin infection  -     mupirocin (BACTROBAN NASAL) 2 % nasal ointment; into the nostril(s) as directed by provider 2 (Two) Times a Day.    Sinusitis, unspecified chronicity, unspecified location  -     azithromycin (ZITHROMAX Z-DRAKE) 250 MG tablet; Take 2 tablets the first day, then 1 tablet daily for 4 days.    Leukocytes in urine  -     Urine Culture - Urine, Urine, Clean Catch    Other orders  -     polyethylene glycol (MIRALAX) packet; Take 17 g by mouth Daily.  -     mometasone-formoterol (DULERA) 200-5 MCG/ACT inhaler; Inhale 2 puffs 2 (Two) Times a Day.     add MiraLAX to patient's regimen in regards to constipation.  Continue low-fat diet with plenty of exercise.  Depression and anxiety are stable.  Insomnia stable at this time.  Chronic low back pain without exacerbation.  Glaucoma stable followed by ophthalmology and is up-to-date on her eye exams.  Blood  pressure normal today will continue to follow.  Vascular check her blood pressure when she is at pharmacy or the grocery store.  Pt taking dulera daily and thinks inurance may not pay for it any more.  Will call fi needs to chagne to another medicine.  Advised patient to start taking her allergy medicine daily.      Return in about 3 months (around 7/15/2019) for fasting, Next scheduled follow up.  RTC/call  If symptoms worsen  Meds MOA and SE's reviewed and pt v/u

## 2019-04-16 LAB — BACTERIA SPEC AEROBE CULT: NORMAL

## 2019-04-17 DIAGNOSIS — F32.A DEPRESSION, UNSPECIFIED DEPRESSION TYPE: ICD-10-CM

## 2019-04-17 DIAGNOSIS — F41.9 ANXIETY: ICD-10-CM

## 2019-04-17 RX ORDER — SIMVASTATIN 20 MG
TABLET ORAL
Qty: 30 TABLET | Refills: 1 | Status: SHIPPED | OUTPATIENT
Start: 2019-04-17 | End: 2019-06-07 | Stop reason: SDUPTHER

## 2019-04-17 RX ORDER — GABAPENTIN 400 MG/1
CAPSULE ORAL
Qty: 90 CAPSULE | Refills: 2 | Status: SHIPPED | OUTPATIENT
Start: 2019-04-17 | End: 2019-07-08 | Stop reason: SDUPTHER

## 2019-04-17 RX ORDER — LISINOPRIL 20 MG/1
TABLET ORAL
Qty: 30 TABLET | Refills: 1 | Status: SHIPPED | OUTPATIENT
Start: 2019-04-17 | End: 2019-06-26

## 2019-04-17 RX ORDER — TIOTROPIUM BROMIDE 18 UG/1
CAPSULE ORAL; RESPIRATORY (INHALATION)
Qty: 30 CAPSULE | Refills: 1 | Status: SHIPPED | OUTPATIENT
Start: 2019-04-17 | End: 2019-06-07 | Stop reason: SDUPTHER

## 2019-04-17 RX ORDER — HYDROXYZINE HYDROCHLORIDE 25 MG/1
TABLET, FILM COATED ORAL
Qty: 30 TABLET | Refills: 1 | Status: SHIPPED | OUTPATIENT
Start: 2019-04-17 | End: 2019-06-07 | Stop reason: SDUPTHER

## 2019-04-17 RX ORDER — FLUTICASONE PROPIONATE 50 MCG
SPRAY, SUSPENSION (ML) NASAL
Qty: 16 G | Refills: 1 | Status: SHIPPED | OUTPATIENT
Start: 2019-04-17 | End: 2019-06-07 | Stop reason: SDUPTHER

## 2019-04-18 RX ORDER — BUPROPION HYDROCHLORIDE 150 MG/1
TABLET ORAL
Qty: 30 TABLET | Refills: 2 | Status: SHIPPED | OUTPATIENT
Start: 2019-04-18 | End: 2019-07-23 | Stop reason: SDUPTHER

## 2019-04-18 NOTE — TELEPHONE ENCOUNTER
buPROPion XL (WELLBUTRIN XL) 150 MG 24 hr tablet [[id number]]   Order Details   Dose, Route, Frequency: As Directed    Dispense Quantity: 30 tablet Refills: 2 Fills remaining: --           Sig: take 1 tablet by mouth once daily        Sent to [facility] [address]

## 2019-04-28 DIAGNOSIS — G47.00 INSOMNIA, UNSPECIFIED TYPE: ICD-10-CM

## 2019-04-30 ENCOUNTER — PRIOR AUTHORIZATION (OUTPATIENT)
Dept: INTERNAL MEDICINE | Facility: CLINIC | Age: 54
End: 2019-04-30

## 2019-04-30 NOTE — TELEPHONE ENCOUNTER
PA submitted for pt's dulera inhaler. KEY: [id number] Diagnosis: J44.9 COPD, pt has tried and failed symbicort and spiriva. PA submitted to CMM, questions answered, awaiting answer.

## 2019-05-01 RX ORDER — TRAZODONE HYDROCHLORIDE 50 MG/1
TABLET ORAL
Qty: 30 TABLET | Refills: 2 | Status: SHIPPED | OUTPATIENT
Start: 2019-05-01 | End: 2019-08-15 | Stop reason: SDUPTHER

## 2019-05-02 DIAGNOSIS — R79.89 INCREASED THYROID STIMULATING HORMONE (TSH) LEVEL: Primary | ICD-10-CM

## 2019-05-03 ENCOUNTER — LAB (OUTPATIENT)
Dept: INTERNAL MEDICINE | Facility: CLINIC | Age: 54
End: 2019-05-03

## 2019-05-03 DIAGNOSIS — R79.89 INCREASED THYROID STIMULATING HORMONE (TSH) LEVEL: ICD-10-CM

## 2019-05-03 LAB
T4 FREE SERPL-MCNC: 0.87 NG/DL (ref 0.93–1.7)
TSH SERPL DL<=0.05 MIU/L-ACNC: 9.91 MIU/ML (ref 0.27–4.2)

## 2019-05-03 PROCEDURE — 84443 ASSAY THYROID STIM HORMONE: CPT | Performed by: NURSE PRACTITIONER

## 2019-05-03 PROCEDURE — 84439 ASSAY OF FREE THYROXINE: CPT | Performed by: NURSE PRACTITIONER

## 2019-05-03 PROCEDURE — 36415 COLL VENOUS BLD VENIPUNCTURE: CPT | Performed by: NURSE PRACTITIONER

## 2019-05-05 DIAGNOSIS — E03.9 ACQUIRED HYPOTHYROIDISM: Primary | ICD-10-CM

## 2019-05-05 RX ORDER — LEVOTHYROXINE SODIUM 0.07 MG/1
75 TABLET ORAL DAILY
Qty: 30 TABLET | Refills: 1 | Status: SHIPPED | OUTPATIENT
Start: 2019-05-05 | End: 2019-07-24 | Stop reason: SDUPTHER

## 2019-06-06 ENCOUNTER — OFFICE VISIT (OUTPATIENT)
Dept: RETAIL CLINIC | Facility: CLINIC | Age: 54
End: 2019-06-06

## 2019-06-06 VITALS
DIASTOLIC BLOOD PRESSURE: 60 MMHG | HEART RATE: 109 BPM | WEIGHT: 237 LBS | SYSTOLIC BLOOD PRESSURE: 104 MMHG | TEMPERATURE: 97.9 F | BODY MASS INDEX: 44.75 KG/M2 | OXYGEN SATURATION: 93 % | HEIGHT: 61 IN

## 2019-06-06 DIAGNOSIS — J40 BRONCHITIS: Primary | ICD-10-CM

## 2019-06-06 DIAGNOSIS — H65.03 BILATERAL ACUTE SEROUS OTITIS MEDIA, RECURRENCE NOT SPECIFIED: ICD-10-CM

## 2019-06-06 PROCEDURE — 99213 OFFICE O/P EST LOW 20 MIN: CPT | Performed by: NURSE PRACTITIONER

## 2019-06-06 RX ORDER — GUAIFENESIN AND DEXTROMETHORPHAN HYDROBROMIDE 600; 30 MG/1; MG/1
1 TABLET, EXTENDED RELEASE ORAL EVERY 12 HOURS SCHEDULED
Qty: 20 TABLET | Refills: 0 | Status: SHIPPED | OUTPATIENT
Start: 2019-06-06 | End: 2019-12-22

## 2019-06-06 RX ORDER — CEFDINIR 300 MG/1
300 CAPSULE ORAL 2 TIMES DAILY
Qty: 20 CAPSULE | Refills: 0 | Status: SHIPPED | OUTPATIENT
Start: 2019-06-06 | End: 2019-06-16

## 2019-06-06 NOTE — PATIENT INSTRUCTIONS
Acute Bronchitis, Adult  Acute bronchitis is when air tubes (bronchi) in the lungs suddenly get swollen. The condition can make it hard to breathe. It can also cause these symptoms:  · A cough.  · Coughing up clear, yellow, or green mucus.  · Wheezing.  · Chest congestion.  · Shortness of breath.  · A fever.  · Body aches.  · Chills.  · A sore throat.    Follow these instructions at home:  Medicines  · Take over-the-counter and prescription medicines only as told by your doctor.  · If you were prescribed an antibiotic medicine, take it as told by your doctor. Do not stop taking the antibiotic even if you start to feel better.  General instructions    · Rest.  · Drink enough fluids to keep your pee (urine) pale yellow.  · Avoid smoking and secondhand smoke. If you smoke and you need help quitting, ask your doctor. Quitting will help your lungs heal faster.  · Use an inhaler, cool mist vaporizer, or humidifier as told by your doctor.  · Keep all follow-up visits as told by your doctor. This is important.  How is this prevented?  To lower your risk of getting this condition again:  · Wash your hands often with soap and water. If you cannot use soap and water, use hand .  · Avoid contact with people who have cold symptoms.  · Try not to touch your hands to your mouth, nose, or eyes.  · Make sure to get the flu shot every year.    Contact a doctor if:  · Your symptoms do not get better in 2 weeks.  Get help right away if:  · You cough up blood.  · You have chest pain.  · You have very bad shortness of breath.  · You become dehydrated.  · You faint (pass out) or keep feeling like you are going to pass out.  · You keep throwing up (vomiting).  · You have a very bad headache.  · Your fever or chills gets worse.  This information is not intended to replace advice given to you by your health care provider. Make sure you discuss any questions you have with your health care provider.  Document Released: 06/05/2009  Document Revised: 08/01/2018 Document Reviewed: 06/07/2017  [employer] Interactive Patient Education © 2019 [employer] Inc.    Otitis Media, Adult  Otitis media means that the middle ear is red and swollen (inflamed) and full of fluid. The condition usually goes away on its own.  Follow these instructions at home:  · Take over-the-counter and prescription medicines only as told by your doctor.  · If you were prescribed an antibiotic medicine, take it as told by your doctor. Do not stop taking the antibiotic even if you start to feel better.  · Keep all follow-up visits as told by your doctor. This is important.  Contact a doctor if:  · You have bleeding from your nose.  · There is a lump on your neck.  · You are not getting better in 5 days.  · You feel worse instead of better.  Get help right away if:  · You have pain that is not helped with medicine.  · You have swelling, redness, or pain around your ear.  · You get a stiff neck.  · You cannot move part of your face (paralyzed).  · You notice that the bone behind your ear hurts when you touch it.  · You get a very bad headache.  Summary  · Otitis media means that the middle ear is red, swollen, and full of fluid.  · This condition usually goes away on its own. In some cases, treatment may be needed.  · If you were prescribed an antibiotic medicine, take it as told by your doctor.  This information is not intended to replace advice given to you by your health care provider. Make sure you discuss any questions you have with your health care provider.  Document Released: 06/05/2009 Document Revised: 01/08/2018 Document Reviewed: 01/08/2018  [employer] Interactive Patient Education © 2019 [employer] Inc.

## 2019-06-06 NOTE — PROGRESS NOTES
FINESSE Lowe is a 53 y.o. female.   Chief Complaint   Patient presents with   • Cough      History of Present Illness   Cough and congestion with green thick expectorant with a foul taste present for almost a month. She is taking Tessalon Pearls and using inhalers without improvement. She denies fever or pain.   The following portions of the patient's history were reviewed and updated as appropriate: allergies, current medications, past family history, past medical history, past social history, past surgical history and problem list.    Current Outpatient Medications:   •  albuterol sulfate  (90 Base) MCG/ACT inhaler, Inhale 2 puffs Every 4 (Four) Hours As Needed for Wheezing., Disp: 1 inhaler, Rfl: 0  •  B Complex Vitamins (VITAMIN B COMPLEX PO), Take 1 tablet by mouth Daily As Needed., Disp: , Rfl:   •  buPROPion XL (WELLBUTRIN XL) 150 MG 24 hr tablet, take 1 tablet by mouth once daily, Disp: 30 tablet, Rfl: 2  •  fluticasone (FLONASE) 50 MCG/ACT nasal spray, instill 1 spray into each nostril once daily, Disp: 16 g, Rfl: 1  •  gabapentin (NEURONTIN) 400 MG capsule, take 1 capsule by mouth three times a day, Disp: 90 capsule, Rfl: 2  •  hydrOXYzine (ATARAX) 25 MG tablet, take 1 tablet by mouth at bedtime if needed, Disp: 30 tablet, Rfl: 1  •  lansoprazole (PREVACID) 30 MG capsule, take 1 capsule by mouth once daily, Disp: 30 capsule, Rfl: 0  •  latanoprost (XALATAN) 0.005 % ophthalmic solution, Administer 1 drop to both eyes every night., Disp: , Rfl:   •  levothyroxine (SYNTHROID) 75 MCG tablet, Take 1 tablet by mouth Daily., Disp: 30 tablet, Rfl: 1  •  LINZESS 72 MCG capsule capsule, take 1 capsule by mouth every morning, Disp: 30 capsule, Rfl: 0  •  lisinopril (PRINIVIL,ZESTRIL) 20 MG tablet, take 1 tablet by mouth at bedtime, Disp: 30 tablet, Rfl: 1  •  lisinopril (PRINIVIL,ZESTRIL) 30 MG tablet, Take 1 tablet by mouth every night at bedtime., Disp: 30 tablet, Rfl: 3  •   meloxicam (MOBIC) 7.5 MG tablet, take 1 tablet by mouth daily, Disp: 30 tablet, Rfl: 5  •  mometasone-formoterol (DULERA) 200-5 MCG/ACT inhaler, Inhale 2 puffs 2 (Two) Times a Day., Disp: 13 g, Rfl: 3  •  Multiple Vitamins-Minerals (HAIR/SKIN/NAILS/BIOTIN) tablet, Take 1 tablet by mouth Daily As Needed., Disp: , Rfl:   •  polyethylene glycol (MIRALAX) packet, Take 17 g by mouth Daily., Disp: 72 packet, Rfl: 2  •  RA ALLERGY RELIEF 10 MG tablet, take 1 tablet by mouth once daily, Disp: 30 tablet, Rfl: 5  •  RA FIBER THERAPY 625 MG tablet, , Disp: , Rfl: 1  •  sertraline (ZOLOFT) 50 MG tablet, take 1 tablet by mouth once daily, Disp: 30 tablet, Rfl: 1  •  simvastatin (ZOCOR) 20 MG tablet, take 1 tablet by mouth at bedtime, Disp: 30 tablet, Rfl: 1  •  SPIRIVA HANDIHALER 18 MCG per inhalation capsule, inhale the contents of one capsule in the handihaler once daily, Disp: 30 capsule, Rfl: 1  •  traZODone (DESYREL) 50 MG tablet, take 1 tablet by mouth at bedtime if needed, Disp: 30 tablet, Rfl: 2  •  vitamin B-12 (CYANOCOBALAMIN) 1000 MCG tablet, Take 5,000 mcg by mouth Daily As Needed., Disp: , Rfl:   •  azithromycin (ZITHROMAX Z-DRAKE) 250 MG tablet, Take 2 tablets the first day, then 1 tablet daily for 4 days., Disp: 6 tablet, Rfl: 0  •  Blood Pressure Monitoring (BLOOD PRESSURE CUFF) misc, , Disp: , Rfl:   •  cefdinir (OMNICEF) 300 MG capsule, Take 1 capsule by mouth 2 (Two) Times a Day for 10 days., Disp: 20 capsule, Rfl: 0  •  guaifenesin-dextromethorphan (MUCINEX DM)  MG tablet sustained-release 12 hour tablet, Take 1 tablet by mouth Every 12 (Twelve) Hours., Disp: 20 tablet, Rfl: 0  •  ketoconazole (NIZORAL) 2 % shampoo, Apply  topically 2 (two) times a week. Apply to affected area, Disp: 120 mL, Rfl: 1  •  mupirocin (BACTROBAN NASAL) 2 % nasal ointment, into the nostril(s) as directed by provider 2 (Two) Times a Day., Disp: 10 g, Rfl: 0  •  sertraline (ZOLOFT) 100 MG tablet, Take 1 tablet by mouth Daily.,  "Disp: 30 tablet, Rfl: 2    Allergies   Allergen Reactions   • Cyclobenzaprine Unknown (See Comments)     Pt cannot remember       Review of Systems   Constitutional: Negative.    HENT: Positive for congestion. Negative for ear discharge, ear pain, sinus pressure, sinus pain, sore throat, trouble swallowing and voice change.    Respiratory: Positive for cough, chest tightness and shortness of breath. Negative for wheezing.         Thick green mucus with foul taste   Cardiovascular: Negative.    Gastrointestinal: Negative.    Musculoskeletal: Negative.    Skin: Negative.    Allergic/Immunologic: Negative.    Neurological: Negative.    Hematological: Negative.    Psychiatric/Behavioral: Negative.        Objective     Visit Vitals  /60   Pulse 109   Temp 97.9 °F (36.6 °C)   Ht 154.9 cm (61\")   Wt 108 kg (237 lb)   LMP 09/01/2013   SpO2 93%   BMI 44.78 kg/m²         Physical Exam   Constitutional: She is oriented to person, place, and time. Vital signs are normal. She appears well-developed and well-nourished. She is cooperative.  Non-toxic appearance. She does not have a sickly appearance. She does not appear ill. No distress.   HENT:   Head: Normocephalic and atraumatic.   Right Ear: Hearing, external ear and ear canal normal. Tympanic membrane is erythematous. Tympanic membrane is not injected. A middle ear effusion is present.   Left Ear: Hearing, external ear and ear canal normal. Tympanic membrane is erythematous. Tympanic membrane is not injected. A middle ear effusion is present.   Nose: No mucosal edema or rhinorrhea. Right sinus exhibits no maxillary sinus tenderness and no frontal sinus tenderness. Left sinus exhibits no maxillary sinus tenderness and no frontal sinus tenderness.   Mouth/Throat: Oropharynx is clear and moist and mucous membranes are normal. No oropharyngeal exudate, posterior oropharyngeal edema, posterior oropharyngeal erythema or tonsillar abscesses. Tonsils are 0 on the right. Tonsils " are 0 on the left. No tonsillar exudate.   Eyes: Conjunctivae are normal.   Neck: Normal range of motion. Neck supple.   Cardiovascular: Normal rate, regular rhythm and normal heart sounds.   No murmur heard.  Pulmonary/Chest: Effort normal and breath sounds normal. No respiratory distress. She has no wheezes.   Lymphadenopathy:     She has no cervical adenopathy.   Neurological: She is alert and oriented to person, place, and time.   Skin: Skin is warm and dry.   Psychiatric: She has a normal mood and affect. Her behavior is normal. Judgment and thought content normal.   Nursing note and vitals reviewed.      Lab Results (last 24 hours)     ** No results found for the last 24 hours. **          Assessment/Plan   Adrianne was seen today for cough.    Diagnoses and all orders for this visit:    Bronchitis  -     cefdinir (OMNICEF) 300 MG capsule; Take 1 capsule by mouth 2 (Two) Times a Day for 10 days.  -     guaifenesin-dextromethorphan (MUCINEX DM)  MG tablet sustained-release 12 hour tablet; Take 1 tablet by mouth Every 12 (Twelve) Hours.    Bilateral acute serous otitis media, recurrence not specified  -     cefdinir (OMNICEF) 300 MG capsule; Take 1 capsule by mouth 2 (Two) Times a Day for 10 days.    ROMI Wang

## 2019-06-07 DIAGNOSIS — J44.9 CHRONIC OBSTRUCTIVE PULMONARY DISEASE, UNSPECIFIED COPD TYPE (HCC): ICD-10-CM

## 2019-06-07 DIAGNOSIS — F41.9 ANXIETY: ICD-10-CM

## 2019-06-07 DIAGNOSIS — J30.89 SEASONAL ALLERGIC RHINITIS DUE TO OTHER ALLERGIC TRIGGER: ICD-10-CM

## 2019-06-07 DIAGNOSIS — E78.5 HYPERLIPIDEMIA, UNSPECIFIED HYPERLIPIDEMIA TYPE: ICD-10-CM

## 2019-06-07 RX ORDER — HYDROXYZINE HYDROCHLORIDE 25 MG/1
TABLET, FILM COATED ORAL
Qty: 30 TABLET | Refills: 1 | Status: SHIPPED | OUTPATIENT
Start: 2019-06-07 | End: 2019-07-23 | Stop reason: SDUPTHER

## 2019-06-07 RX ORDER — SIMVASTATIN 20 MG
TABLET ORAL
Qty: 30 TABLET | Refills: 1 | Status: SHIPPED | OUTPATIENT
Start: 2019-06-07 | End: 2019-07-17 | Stop reason: SDUPTHER

## 2019-06-07 RX ORDER — FLUTICASONE PROPIONATE 50 MCG
SPRAY, SUSPENSION (ML) NASAL
Qty: 16 G | Refills: 1 | Status: SHIPPED | OUTPATIENT
Start: 2019-06-07 | End: 2019-07-17 | Stop reason: SDUPTHER

## 2019-06-07 RX ORDER — TIOTROPIUM BROMIDE 18 UG/1
CAPSULE ORAL; RESPIRATORY (INHALATION)
Qty: 30 CAPSULE | Refills: 1 | Status: SHIPPED | OUTPATIENT
Start: 2019-06-07 | End: 2019-07-17 | Stop reason: SDUPTHER

## 2019-06-12 DIAGNOSIS — J44.9 CHRONIC OBSTRUCTIVE PULMONARY DISEASE, UNSPECIFIED COPD TYPE (HCC): Primary | ICD-10-CM

## 2019-06-12 RX ORDER — FLUTICASONE PROPIONATE AND SALMETEROL 113; 14 UG/1; UG/1
1 POWDER, METERED RESPIRATORY (INHALATION) 2 TIMES DAILY
Qty: 1 EACH | Refills: 4 | Status: SHIPPED | OUTPATIENT
Start: 2019-06-12 | End: 2020-01-22 | Stop reason: SDUPTHER

## 2019-06-24 ENCOUNTER — TELEPHONE (OUTPATIENT)
Dept: INTERNAL MEDICINE | Facility: CLINIC | Age: 54
End: 2019-06-24

## 2019-06-24 NOTE — TELEPHONE ENCOUNTER
6-24-19  Called to informed patient that she was past due on getting her TSH drawn.  She stated she has not been taking her Thyroid medication due to it was affecting her heart.  I informed her that I would send the message to RILEY Leo and see what she wanted to do and have a nurse call her back .  She gave verbal understanding.

## 2019-06-25 NOTE — TELEPHONE ENCOUNTER
"Spoke to pt. States she had racing heart sensation, especially at night but also during day, and pounding heart. She did attempt to take meds x2 weeks as directed, but d/c synthroid about 3 weeks ago d/t s/e. Also felt that she was \"dragging\" throughout the day on this med. All sympoms resolved since she stopped med.     Advised ok to hold synthroid at this time as her thyroid levels were just mildly decreased, and to discuss with PCP at next visit. May be able to decrease dose of synthroid and titrate up. Pt wants to wait on any dose change until next visit.   "

## 2019-06-26 DIAGNOSIS — I10 ESSENTIAL HYPERTENSION: ICD-10-CM

## 2019-06-26 RX ORDER — LISINOPRIL 30 MG/1
TABLET ORAL
Qty: 30 TABLET | Refills: 5 | Status: SHIPPED | OUTPATIENT
Start: 2019-06-26 | End: 2020-01-11 | Stop reason: SDUPTHER

## 2019-07-07 DIAGNOSIS — M54.50 CHRONIC LOW BACK PAIN WITHOUT SCIATICA, UNSPECIFIED BACK PAIN LATERALITY: ICD-10-CM

## 2019-07-07 DIAGNOSIS — G89.29 CHRONIC LOW BACK PAIN WITHOUT SCIATICA, UNSPECIFIED BACK PAIN LATERALITY: ICD-10-CM

## 2019-07-07 RX ORDER — GABAPENTIN 400 MG/1
CAPSULE ORAL
Qty: 90 CAPSULE | Refills: 2 | Status: CANCELLED | OUTPATIENT
Start: 2019-07-07

## 2019-07-08 ENCOUNTER — TELEPHONE (OUTPATIENT)
Dept: INTERNAL MEDICINE | Facility: CLINIC | Age: 54
End: 2019-07-08

## 2019-07-08 RX ORDER — GABAPENTIN 400 MG/1
400 CAPSULE ORAL 3 TIMES DAILY
Qty: 90 CAPSULE | Refills: 0 | Status: SHIPPED | OUTPATIENT
Start: 2019-07-08 | End: 2019-07-23 | Stop reason: SDUPTHER

## 2019-07-08 NOTE — TELEPHONE ENCOUNTER
Please have patient make appointment.  She is overdue for thyroid to be checked.  I did send in 1 month refill of her gabapentin until she could make follow-up appointment.

## 2019-07-08 NOTE — TELEPHONE ENCOUNTER
Patient informed that you had sent in 1 month refill of her gabapentin until her follow up appt. Patient has an appt scheduled for 7/18/19 for a follow up.

## 2019-07-09 RX ORDER — MELOXICAM 7.5 MG/1
TABLET ORAL
Qty: 30 TABLET | Refills: 5 | OUTPATIENT
Start: 2019-07-09

## 2019-07-12 DIAGNOSIS — M54.50 CHRONIC LOW BACK PAIN WITHOUT SCIATICA, UNSPECIFIED BACK PAIN LATERALITY: ICD-10-CM

## 2019-07-12 DIAGNOSIS — G89.29 CHRONIC LOW BACK PAIN WITHOUT SCIATICA, UNSPECIFIED BACK PAIN LATERALITY: ICD-10-CM

## 2019-07-12 DIAGNOSIS — F41.9 ANXIETY: ICD-10-CM

## 2019-07-12 DIAGNOSIS — F32.A DEPRESSION, UNSPECIFIED DEPRESSION TYPE: ICD-10-CM

## 2019-07-12 RX ORDER — MELOXICAM 7.5 MG/1
7.5 TABLET ORAL DAILY
Qty: 30 TABLET | Refills: 5 | Status: SHIPPED | OUTPATIENT
Start: 2019-07-12 | End: 2020-02-21 | Stop reason: SDUPTHER

## 2019-07-17 DIAGNOSIS — J44.9 CHRONIC OBSTRUCTIVE PULMONARY DISEASE, UNSPECIFIED COPD TYPE (HCC): ICD-10-CM

## 2019-07-17 DIAGNOSIS — E78.5 HYPERLIPIDEMIA, UNSPECIFIED HYPERLIPIDEMIA TYPE: ICD-10-CM

## 2019-07-17 DIAGNOSIS — J30.89 SEASONAL ALLERGIC RHINITIS DUE TO OTHER ALLERGIC TRIGGER: ICD-10-CM

## 2019-07-18 RX ORDER — FLUTICASONE PROPIONATE 50 MCG
SPRAY, SUSPENSION (ML) NASAL
Qty: 16 G | Refills: 1 | Status: SHIPPED | OUTPATIENT
Start: 2019-07-18 | End: 2019-09-10 | Stop reason: SDUPTHER

## 2019-07-18 RX ORDER — SIMVASTATIN 20 MG
TABLET ORAL
Qty: 30 TABLET | Refills: 1 | Status: SHIPPED | OUTPATIENT
Start: 2019-07-18 | End: 2019-09-10 | Stop reason: SDUPTHER

## 2019-07-18 RX ORDER — TIOTROPIUM BROMIDE 18 UG/1
CAPSULE ORAL; RESPIRATORY (INHALATION)
Qty: 30 CAPSULE | Refills: 1 | Status: SHIPPED | OUTPATIENT
Start: 2019-07-18 | End: 2019-09-10 | Stop reason: SDUPTHER

## 2019-07-23 ENCOUNTER — OFFICE VISIT (OUTPATIENT)
Dept: INTERNAL MEDICINE | Facility: CLINIC | Age: 54
End: 2019-07-23

## 2019-07-23 ENCOUNTER — TELEPHONE (OUTPATIENT)
Dept: INTERNAL MEDICINE | Facility: CLINIC | Age: 54
End: 2019-07-23

## 2019-07-23 VITALS
SYSTOLIC BLOOD PRESSURE: 118 MMHG | RESPIRATION RATE: 18 BRPM | OXYGEN SATURATION: 98 % | TEMPERATURE: 97.4 F | BODY MASS INDEX: 44.59 KG/M2 | WEIGHT: 236 LBS | HEART RATE: 66 BPM | DIASTOLIC BLOOD PRESSURE: 78 MMHG

## 2019-07-23 DIAGNOSIS — J30.2 SEASONAL ALLERGIES: ICD-10-CM

## 2019-07-23 DIAGNOSIS — E66.01 MORBIDLY OBESE (HCC): ICD-10-CM

## 2019-07-23 DIAGNOSIS — G47.09 OTHER INSOMNIA: ICD-10-CM

## 2019-07-23 DIAGNOSIS — M54.50 CHRONIC LOW BACK PAIN WITHOUT SCIATICA, UNSPECIFIED BACK PAIN LATERALITY: ICD-10-CM

## 2019-07-23 DIAGNOSIS — E03.9 HYPOTHYROIDISM, UNSPECIFIED TYPE: ICD-10-CM

## 2019-07-23 DIAGNOSIS — F32.A DEPRESSION, UNSPECIFIED DEPRESSION TYPE: ICD-10-CM

## 2019-07-23 DIAGNOSIS — H40.9 GLAUCOMA OF BOTH EYES, UNSPECIFIED GLAUCOMA TYPE: ICD-10-CM

## 2019-07-23 DIAGNOSIS — G89.29 CHRONIC LOW BACK PAIN WITHOUT SCIATICA, UNSPECIFIED BACK PAIN LATERALITY: ICD-10-CM

## 2019-07-23 DIAGNOSIS — F41.9 ANXIETY: ICD-10-CM

## 2019-07-23 DIAGNOSIS — I10 ESSENTIAL HYPERTENSION: Primary | ICD-10-CM

## 2019-07-23 DIAGNOSIS — E78.5 HYPERLIPIDEMIA, UNSPECIFIED HYPERLIPIDEMIA TYPE: ICD-10-CM

## 2019-07-23 DIAGNOSIS — K64.8 INTERNAL HEMORRHOID: ICD-10-CM

## 2019-07-23 DIAGNOSIS — E03.9 ACQUIRED HYPOTHYROIDISM: ICD-10-CM

## 2019-07-23 DIAGNOSIS — K59.09 OTHER CONSTIPATION: ICD-10-CM

## 2019-07-23 DIAGNOSIS — J44.9 CHRONIC OBSTRUCTIVE PULMONARY DISEASE, UNSPECIFIED COPD TYPE (HCC): ICD-10-CM

## 2019-07-23 LAB
ALBUMIN SERPL-MCNC: 4.4 G/DL (ref 3.5–5.2)
ALBUMIN/GLOB SERPL: 1.7 G/DL
ALP SERPL-CCNC: 79 U/L (ref 39–117)
ALT SERPL W P-5'-P-CCNC: 26 U/L (ref 1–33)
ANION GAP SERPL CALCULATED.3IONS-SCNC: 11.8 MMOL/L (ref 5–15)
AST SERPL-CCNC: 18 U/L (ref 1–32)
BASOPHILS # BLD AUTO: 0.03 10*3/MM3 (ref 0–0.2)
BASOPHILS NFR BLD AUTO: 0.5 % (ref 0–1.5)
BILIRUB BLD-MCNC: NEGATIVE MG/DL
BILIRUB SERPL-MCNC: 0.2 MG/DL (ref 0.2–1.2)
BUN BLD-MCNC: 12 MG/DL (ref 6–20)
BUN/CREAT SERPL: 19.4 (ref 7–25)
CALCIUM SPEC-SCNC: 9.3 MG/DL (ref 8.6–10.5)
CHLORIDE SERPL-SCNC: 102 MMOL/L (ref 98–107)
CHOLEST SERPL-MCNC: 126 MG/DL (ref 0–200)
CLARITY, POC: CLEAR
CO2 SERPL-SCNC: 26.2 MMOL/L (ref 22–29)
COLOR UR: YELLOW
CREAT BLD-MCNC: 0.62 MG/DL (ref 0.57–1)
DEPRECATED RDW RBC AUTO: 45.1 FL (ref 37–54)
DIFFERENTIAL METHOD BLD: ABNORMAL
EOSINOPHIL # BLD AUTO: 0.15 10*3/MM3 (ref 0–0.4)
EOSINOPHIL NFR BLD AUTO: 2.4 % (ref 0.3–6.2)
ERYTHROCYTE [DISTWIDTH] IN BLOOD BY AUTOMATED COUNT: 14.1 % (ref 12.3–15.4)
EXPIRATION DATE: NORMAL
GFR SERPL CREATININE-BSD FRML MDRD: 100 ML/MIN/1.73
GLOBULIN UR ELPH-MCNC: 2.6 GM/DL
GLUCOSE BLD-MCNC: 96 MG/DL (ref 65–99)
GLUCOSE UR STRIP-MCNC: NEGATIVE MG/DL
HCT VFR BLD AUTO: 43.7 % (ref 34–46.6)
HDLC SERPL-MCNC: 41 MG/DL (ref 40–60)
HGB BLD-MCNC: 12.9 G/DL (ref 12–15.9)
IMM GRANULOCYTES # BLD AUTO: 0.01 10*3/MM3 (ref 0–0.05)
IMM GRANULOCYTES NFR BLD AUTO: 0.2 % (ref 0–0.5)
KETONES UR QL: NEGATIVE
LDLC SERPL CALC-MCNC: 57 MG/DL (ref 0–100)
LDLC/HDLC SERPL: 1.38 {RATIO}
LEUKOCYTE EST, POC: NEGATIVE
LYMPHOCYTES # BLD AUTO: 2.1 10*3/MM3 (ref 0.7–3.1)
LYMPHOCYTES NFR BLD AUTO: 33.8 % (ref 19.6–45.3)
Lab: NORMAL
MCH RBC QN AUTO: 25.9 PG (ref 26.6–33)
MCHC RBC AUTO-ENTMCNC: 29.5 G/DL (ref 31.5–35.7)
MCV RBC AUTO: 87.6 FL (ref 79–97)
MONOCYTES # BLD AUTO: 0.47 10*3/MM3 (ref 0.1–0.9)
MONOCYTES NFR BLD AUTO: 7.6 % (ref 5–12)
NEUTROPHILS # BLD AUTO: 3.46 10*3/MM3 (ref 1.7–7)
NEUTROPHILS NFR BLD AUTO: 55.5 % (ref 42.7–76)
NITRITE UR-MCNC: NEGATIVE MG/ML
NRBC BLD AUTO-RTO: 0 /100 WBC (ref 0–0.2)
PH UR: 5 [PH] (ref 5–8)
PLATELET # BLD AUTO: 129 10*3/MM3 (ref 140–450)
PMV BLD AUTO: 14.5 FL (ref 6–12)
POTASSIUM BLD-SCNC: 4.5 MMOL/L (ref 3.5–5.2)
PROT SERPL-MCNC: 7 G/DL (ref 6–8.5)
PROT UR STRIP-MCNC: NEGATIVE MG/DL
RBC # BLD AUTO: 4.99 10*6/MM3 (ref 3.77–5.28)
RBC # UR STRIP: NEGATIVE /UL
SODIUM BLD-SCNC: 140 MMOL/L (ref 136–145)
SP GR UR: 1.01 (ref 1–1.03)
T4 FREE SERPL-MCNC: 0.88 NG/DL (ref 0.93–1.7)
TRIGL SERPL-MCNC: 142 MG/DL (ref 0–150)
TSH SERPL DL<=0.05 MIU/L-ACNC: 6.61 MIU/ML (ref 0.27–4.2)
UROBILINOGEN UR QL: NORMAL
VLDLC SERPL-MCNC: 28.4 MG/DL (ref 5–40)
WBC # BLD AUTO: 6.22 10*3/MM3 (ref 3.4–10.8)
WBC NRBC COR # BLD: 6.22 10*3/MM3 (ref 3.4–10.8)

## 2019-07-23 PROCEDURE — 85025 COMPLETE CBC W/AUTO DIFF WBC: CPT | Performed by: NURSE PRACTITIONER

## 2019-07-23 PROCEDURE — 84443 ASSAY THYROID STIM HORMONE: CPT | Performed by: NURSE PRACTITIONER

## 2019-07-23 PROCEDURE — 80061 LIPID PANEL: CPT | Performed by: NURSE PRACTITIONER

## 2019-07-23 PROCEDURE — 99214 OFFICE O/P EST MOD 30 MIN: CPT | Performed by: NURSE PRACTITIONER

## 2019-07-23 PROCEDURE — 80053 COMPREHEN METABOLIC PANEL: CPT | Performed by: NURSE PRACTITIONER

## 2019-07-23 PROCEDURE — 84439 ASSAY OF FREE THYROXINE: CPT | Performed by: NURSE PRACTITIONER

## 2019-07-23 RX ORDER — HYDROXYZINE HYDROCHLORIDE 25 MG/1
TABLET, FILM COATED ORAL
Qty: 30 TABLET | Refills: 1 | Status: SHIPPED | OUTPATIENT
Start: 2019-07-23 | End: 2019-09-18 | Stop reason: SDUPTHER

## 2019-07-23 RX ORDER — BUPROPION HYDROCHLORIDE 300 MG/1
300 TABLET ORAL DAILY
Qty: 30 TABLET | Refills: 5 | Status: SHIPPED | OUTPATIENT
Start: 2019-07-23 | End: 2020-01-11 | Stop reason: SDUPTHER

## 2019-07-23 RX ORDER — GABAPENTIN 400 MG/1
400 CAPSULE ORAL 3 TIMES DAILY
Qty: 90 CAPSULE | Refills: 0 | Status: SHIPPED | OUTPATIENT
Start: 2019-07-23 | End: 2019-08-16 | Stop reason: SDUPTHER

## 2019-07-23 NOTE — PATIENT INSTRUCTIONS
MyPlate from USDA  MyPlate is an outline of a general healthy diet based on the 2010 Dietary Guidelines for Americans, from the U.S. Department of Agriculture (USDA). It sets guidelines for how much food you should eat from each food group based on your age, sex, and level of physical activity.  What are tips for following MyPlate?  To follow MyPlate recommendations:  · Eat a wide variety of fruits and vegetables, grains, and protein foods.  · Serve smaller portions and eat less food throughout the day.  · Limit portion sizes to avoid overeating.  · Enjoy your food.  · Get at least 150 minutes of exercise every week. This is about 30 minutes each day, 5 or more days per week.    It can be difficult to have every meal look like MyPlate. Think about MyPlate as eating guidelines for an entire day, rather than each individual meal.  Fruits and Vegetables  · Make half of your plate fruits and vegetables.  · Eat many different colors of fruits and vegetables each day.  · For a 2,000 calorie daily food plan, eat:  ? 2½ cups of vegetables every day.  ? 2 cups of fruit every day.  · 1 cup is equal to:  ? 1 cup raw or cooked vegetables.  ? 1 cup raw fruit.  ? 1 medium-sized orange, apple, or banana.  ? 1 cup 100% fruit or vegetable juice.  ? 2 cups raw leafy greens, such as lettuce, spinach, or kale.  ? ½ cup dried fruit.  Grains  · One fourth of your plate should be grains.  · Make at least half of the grains you eat each day whole grains.  · For a 2,000 calorie daily food plan, eat 6 oz of grains every day.  · 1 oz is equal to:  ? 1 slice bread.  ? 1 cup cereal.  ? ½ cup cooked rice, cereal, or pasta.  Protein  · One fourth of your plate should be protein.  · Eat a wide variety of protein foods, including meat, poultry, fish, eggs, beans, nuts, and tofu.  · For a 2,000 calorie daily food plan, eat 5½ oz of protein every day.  · 1 oz is equal to:  ? 1 oz meat, poultry, or fish.  ? ¼ cup cooked beans.  ? 1 egg.  ? ½ oz nuts  or seeds.  ? 1 Tbsp peanut butter.  Dairy  · Drink fat-free or low-fat (1%) milk.  · Eat or drink dairy as a side to meals.  · For a 2,000 calorie daily food plan, eat or drink 3 cups of dairy every day.  · 1 cup is equal to:  ? 1 cup milk, yogurt, cottage cheese, or soy milk (soy beverage).  ? 2 oz processed cheese.  ? 1½ oz natural cheese.  Fats, oils, salt, and sugars  · Only small amounts of oils are recommended.  · Avoid foods that are high in calories and low in nutritional value (empty calories), like foods high in fat or added sugars.  · Choose foods that are low in salt (sodium). Choose foods that have less than 140 milligrams (mg) of sodium per serving.  · Drink water instead of sugary drinks. Drink enough water each day to keep your urine pale yellow.  Where to find support  · Work with your health care provider or a nutrition specialist (dietitian) to develop a customized eating plan that is right for you.  · Download an farhan (mobile application) to help you track your daily food intake.  Where to find more information  · Go to ChooseMyPlate.gov for more information.  · Learn more and log your daily food intake according to MyPlate using USDA's SuperTracker: www.Instamediaer.usda.gov  Summary  · MyPlate is a general guideline for healthy eating from the USDA. It is based on the 2010 Dietary Guidelines for Americans.  · In general, fruits and vegetables should take up ½ of your plate, grains should take up ¼ of your plate, and protein should take up ¼ of your plate.  This information is not intended to replace advice given to you by your health care provider. Make sure you discuss any questions you have with your health care provider.  Document Released: 01/06/2009 Document Revised: 03/19/2018 Document Reviewed: 03/19/2018  [employer] Interactive Patient Education © 2019 [employer]

## 2019-07-23 NOTE — PROGRESS NOTES
Chief Complaint   Patient presents with   • Hyperlipidemia     follow up        Subjective     History of Present Illness   Patient is here today for fasting chronic follow-up visit.    Patient has no new concerns.    Patient was seen and noted to have a low TSH with repeat TSH and free T4 both abnormal.  Patient started on 75 mg of Synthroid and called with concern for tachycardia.  The patient spoke with 1 of our PAs and was told to stop the medication until her follow-up with me.  Patient has not been on any medication for her hypothyroidism since that time.    Patient has hyperlipidemia.  She has no abdominal pain or myalgias.  She takes her Zocor at bedtime without any use of alcohol and knows to limit Tylenol use.    Patient has COPD without exacerbation.  She reports on occasion which is rare that she needs to use her albuterol.  She is not using Spiriva or Advair.  At times when she has a cold or has trigger she will use both of these inhalers.  She is a past history of tobacco abuse not presently smoking.    Patient has obesity she is down 1 pound.  She reports regards her diet she is trying to watch diet in regards to eating less meat.  She is not exercising at this time.    Patient has internal hemorrhoids but without exacerbation.  The patient is taking Linzess daily and having regular daily bowel movements.      Patient has anxiety and depression does not feel like she is in control in regards to her depression.  She has no SI or HI.    The patient has insomnia.  Trazodone prescribed at her last visit however her insomnia has not improved.    Patient has glaucoma and sees ophthalmology yearly.    Patient has seasonal allergies and she is not symptomatic at this time.  She does have Flonase at home if she has exacerbations.      Patient has GERD and does have exacerbations daily if she does not take her Prevacid daily.      The following portions of the patient's history were reviewed and updated as  appropriate: allergies, current medications, past family history, past medical history, past social history, past surgical history and problem list.    Review of Systems   Constitutional: Negative for activity change, appetite change, chills, fatigue and fever.   HENT: Negative for congestion, postnasal drip and sore throat.    Eyes: Negative for visual disturbance.   Respiratory: Negative for cough and shortness of breath.    Cardiovascular: Negative for chest pain, palpitations and leg swelling.   Gastrointestinal: Negative for abdominal pain, constipation, diarrhea, nausea and GERD.   Musculoskeletal: Negative for arthralgias and myalgias.   Skin: Negative for rash.   Allergic/Immunologic: Negative for environmental allergies.   Neurological: Negative for dizziness.   Psychiatric/Behavioral: Negative for sleep disturbance. The patient is nervous/anxious.    All other systems reviewed and are negative.      Objective   Physical Exam   Constitutional: She is oriented to person, place, and time. She appears well-developed and well-nourished.   HENT:   Head: Normocephalic and atraumatic.   Right Ear: External ear normal.   Left Ear: External ear normal.   Nose: Nose normal.   Mouth/Throat: Oropharynx is clear and moist.   Neck: No thyromegaly present.   Cardiovascular: Normal rate, regular rhythm and intact distal pulses.   Pulmonary/Chest: Effort normal and breath sounds normal.   Abdominal: Soft. Bowel sounds are normal. She exhibits no distension. There is no tenderness.   Musculoskeletal: She exhibits no edema.   Lymphadenopathy:     She has no cervical adenopathy.   Neurological: She is alert and oriented to person, place, and time.   Skin: Capillary refill takes 2 to 3 seconds.   Psychiatric: She has a normal mood and affect. Her behavior is normal.   Nursing note and vitals reviewed.      Assessment/Plan   Adrianne was seen today for hyperlipidemia.    Diagnoses and all orders for this visit:    Essential  hypertension  -     Comprehensive Metabolic Panel  -     POC Urinalysis Dipstick, Automated    Anxiety  -     buPROPion XL (WELLBUTRIN XL) 300 MG 24 hr tablet; Take 1 tablet by mouth Daily.    Depression, unspecified depression type  -     buPROPion XL (WELLBUTRIN XL) 300 MG 24 hr tablet; Take 1 tablet by mouth Daily.    Internal hemorrhoid    Hyperlipidemia, unspecified hyperlipidemia type  -     Comprehensive Metabolic Panel  -     Lipid Panel    Chronic obstructive pulmonary disease, unspecified COPD type (CMS/HCC)    Morbidly obese (CMS/HCC)    Other constipation    Acquired hypothyroidism    Seasonal allergies  -     CBC & Differential  -     CBC Auto Differential  -     Manual Differential; Future  -     Cancel: Manual Differential    Other insomnia    Glaucoma of both eyes, unspecified glaucoma type    Chronic low back pain without sciatica, unspecified back pain laterality  -     Ambulatory Referral to Pain Management  -     gabapentin (NEURONTIN) 400 MG capsule; Take 1 capsule by mouth 3 (Three) Times a Day.    Hypothyroidism, unspecified type  -     TSH  -     T4, Free    Patient encouraged to eat healthy proteins lean meats and avoid red meats and fried meats. Patient asked to start walking at least 30 minutes daily start slow with 10 to 10-minute walks morning and afternoon.  Continue Linzess for constipation.  In regards to her anxiety depression will increase dose of Wellbutrin she will let me know if this is helpful will increase trazodone to 100 mg nightly.  In regards to patient's low back pain she was given both options for physical therapy and PMR she is chosen to start with PMR initially.    Patient sees ophthalmology yearly for glaucoma.      Return in about 3 months (around 10/23/2019) for fasting, Annual.  RTC/call  If symptoms worsen  Meds MOA and SE's reviewed and pt v/u

## 2019-07-24 DIAGNOSIS — E03.9 ACQUIRED HYPOTHYROIDISM: ICD-10-CM

## 2019-07-24 DIAGNOSIS — E03.9 ACQUIRED HYPOTHYROIDISM: Primary | ICD-10-CM

## 2019-07-24 RX ORDER — LEVOTHYROXINE SODIUM 0.03 MG/1
25 TABLET ORAL DAILY
Qty: 30 TABLET | Refills: 2 | Status: SHIPPED | OUTPATIENT
Start: 2019-07-24 | End: 2020-01-11 | Stop reason: SDUPTHER

## 2019-08-15 DIAGNOSIS — G47.00 INSOMNIA, UNSPECIFIED TYPE: ICD-10-CM

## 2019-08-15 RX ORDER — TRAZODONE HYDROCHLORIDE 50 MG/1
TABLET ORAL
Qty: 30 TABLET | Refills: 2 | Status: SHIPPED | OUTPATIENT
Start: 2019-08-15 | End: 2020-01-11 | Stop reason: SDUPTHER

## 2019-08-15 NOTE — TELEPHONE ENCOUNTER
Jose- 07/23/2019  JUSTUS- 07/23/2019    Last prescribed 05/01/2019 #30 with 2 refills    Last seen in office 07/23/2019.

## 2019-08-16 DIAGNOSIS — M54.50 CHRONIC LOW BACK PAIN WITHOUT SCIATICA, UNSPECIFIED BACK PAIN LATERALITY: ICD-10-CM

## 2019-08-16 DIAGNOSIS — G89.29 CHRONIC LOW BACK PAIN WITHOUT SCIATICA, UNSPECIFIED BACK PAIN LATERALITY: ICD-10-CM

## 2019-08-21 RX ORDER — GABAPENTIN 400 MG/1
CAPSULE ORAL
Qty: 90 CAPSULE | Refills: 0 | Status: SHIPPED | OUTPATIENT
Start: 2019-08-21 | End: 2019-09-13 | Stop reason: SDUPTHER

## 2019-09-07 PROBLEM — D69.6 THROMBOCYTOPENIA: Status: ACTIVE | Noted: 2019-09-07

## 2019-09-10 DIAGNOSIS — J44.9 CHRONIC OBSTRUCTIVE PULMONARY DISEASE, UNSPECIFIED COPD TYPE (HCC): ICD-10-CM

## 2019-09-10 DIAGNOSIS — F32.A DEPRESSION, UNSPECIFIED DEPRESSION TYPE: ICD-10-CM

## 2019-09-10 DIAGNOSIS — J30.89 SEASONAL ALLERGIC RHINITIS DUE TO OTHER ALLERGIC TRIGGER: ICD-10-CM

## 2019-09-10 DIAGNOSIS — E78.5 HYPERLIPIDEMIA, UNSPECIFIED HYPERLIPIDEMIA TYPE: ICD-10-CM

## 2019-09-10 DIAGNOSIS — F41.9 ANXIETY: ICD-10-CM

## 2019-09-10 RX ORDER — SIMVASTATIN 20 MG
TABLET ORAL
Qty: 30 TABLET | Refills: 1 | Status: SHIPPED | OUTPATIENT
Start: 2019-09-10 | End: 2019-11-10 | Stop reason: SDUPTHER

## 2019-09-10 RX ORDER — TIOTROPIUM BROMIDE 18 UG/1
CAPSULE ORAL; RESPIRATORY (INHALATION)
Qty: 30 CAPSULE | Refills: 1 | Status: SHIPPED | OUTPATIENT
Start: 2019-09-10 | End: 2019-11-10 | Stop reason: SDUPTHER

## 2019-09-10 RX ORDER — FLUTICASONE PROPIONATE 50 MCG
SPRAY, SUSPENSION (ML) NASAL
Qty: 16 G | Refills: 1 | Status: SHIPPED | OUTPATIENT
Start: 2019-09-10 | End: 2019-11-10 | Stop reason: SDUPTHER

## 2019-09-13 DIAGNOSIS — G89.29 CHRONIC LOW BACK PAIN WITHOUT SCIATICA, UNSPECIFIED BACK PAIN LATERALITY: ICD-10-CM

## 2019-09-13 DIAGNOSIS — M54.50 CHRONIC LOW BACK PAIN WITHOUT SCIATICA, UNSPECIFIED BACK PAIN LATERALITY: ICD-10-CM

## 2019-09-16 ENCOUNTER — TELEPHONE (OUTPATIENT)
Dept: INTERNAL MEDICINE | Facility: CLINIC | Age: 54
End: 2019-09-16

## 2019-09-16 NOTE — TELEPHONE ENCOUNTER
9-16-19  S/w patient informed her that she needed to come in for blood work lab hours given.  She gave verbal understanding.

## 2019-09-17 ENCOUNTER — LAB (OUTPATIENT)
Dept: INTERNAL MEDICINE | Facility: CLINIC | Age: 54
End: 2019-09-17

## 2019-09-17 DIAGNOSIS — K59.00 CONSTIPATION, UNSPECIFIED CONSTIPATION TYPE: ICD-10-CM

## 2019-09-17 DIAGNOSIS — E03.9 ACQUIRED HYPOTHYROIDISM: ICD-10-CM

## 2019-09-17 LAB
T4 FREE SERPL-MCNC: 1.09 NG/DL (ref 0.93–1.7)
TSH SERPL DL<=0.05 MIU/L-ACNC: 4.36 UIU/ML (ref 0.27–4.2)

## 2019-09-17 PROCEDURE — 36415 COLL VENOUS BLD VENIPUNCTURE: CPT | Performed by: NURSE PRACTITIONER

## 2019-09-17 PROCEDURE — 84439 ASSAY OF FREE THYROXINE: CPT | Performed by: NURSE PRACTITIONER

## 2019-09-17 PROCEDURE — 84443 ASSAY THYROID STIM HORMONE: CPT | Performed by: NURSE PRACTITIONER

## 2019-09-18 DIAGNOSIS — F41.9 ANXIETY: ICD-10-CM

## 2019-09-18 RX ORDER — GABAPENTIN 400 MG/1
CAPSULE ORAL
Qty: 90 CAPSULE | Refills: 0 | Status: SHIPPED | OUTPATIENT
Start: 2019-09-18 | End: 2019-10-17 | Stop reason: SDUPTHER

## 2019-09-19 RX ORDER — HYDROXYZINE HYDROCHLORIDE 25 MG/1
TABLET, FILM COATED ORAL
Qty: 30 TABLET | Refills: 1 | Status: SHIPPED | OUTPATIENT
Start: 2019-09-19 | End: 2019-10-17 | Stop reason: SDUPTHER

## 2019-10-09 RX ORDER — CETIRIZINE HYDROCHLORIDE 10 MG/1
TABLET ORAL
Qty: 30 TABLET | Refills: 0 | Status: SHIPPED | OUTPATIENT
Start: 2019-10-09 | End: 2019-11-10 | Stop reason: SDUPTHER

## 2019-10-17 DIAGNOSIS — K59.00 CONSTIPATION, UNSPECIFIED CONSTIPATION TYPE: ICD-10-CM

## 2019-10-17 DIAGNOSIS — F41.9 ANXIETY: ICD-10-CM

## 2019-10-17 DIAGNOSIS — M54.50 CHRONIC LOW BACK PAIN WITHOUT SCIATICA, UNSPECIFIED BACK PAIN LATERALITY: ICD-10-CM

## 2019-10-17 DIAGNOSIS — G89.29 CHRONIC LOW BACK PAIN WITHOUT SCIATICA, UNSPECIFIED BACK PAIN LATERALITY: ICD-10-CM

## 2019-10-17 RX ORDER — GABAPENTIN 400 MG/1
CAPSULE ORAL
Qty: 90 CAPSULE | Refills: 0 | Status: SHIPPED | OUTPATIENT
Start: 2019-10-17 | End: 2020-01-02 | Stop reason: SDUPTHER

## 2019-10-17 RX ORDER — LINACLOTIDE 72 UG/1
CAPSULE, GELATIN COATED ORAL
Qty: 30 CAPSULE | Refills: 0 | Status: SHIPPED | OUTPATIENT
Start: 2019-10-17 | End: 2020-05-21 | Stop reason: SDUPTHER

## 2019-10-17 RX ORDER — HYDROXYZINE HYDROCHLORIDE 25 MG/1
TABLET, FILM COATED ORAL
Qty: 30 TABLET | Refills: 1 | Status: SHIPPED | OUTPATIENT
Start: 2019-10-17 | End: 2019-11-16 | Stop reason: SDUPTHER

## 2019-10-21 DIAGNOSIS — K21.9 GASTROESOPHAGEAL REFLUX DISEASE WITHOUT ESOPHAGITIS: ICD-10-CM

## 2019-10-21 RX ORDER — LANSOPRAZOLE 30 MG/1
CAPSULE, DELAYED RELEASE ORAL
Qty: 30 CAPSULE | Refills: 0 | Status: SHIPPED | OUTPATIENT
Start: 2019-10-21 | End: 2020-01-11 | Stop reason: SDUPTHER

## 2019-10-30 ENCOUNTER — TELEPHONE (OUTPATIENT)
Dept: INTERNAL MEDICINE | Facility: CLINIC | Age: 54
End: 2019-10-30

## 2019-10-30 ENCOUNTER — OFFICE VISIT (OUTPATIENT)
Dept: INTERNAL MEDICINE | Facility: CLINIC | Age: 54
End: 2019-10-30

## 2019-10-30 VITALS
HEART RATE: 73 BPM | DIASTOLIC BLOOD PRESSURE: 80 MMHG | HEIGHT: 61 IN | OXYGEN SATURATION: 96 % | WEIGHT: 225 LBS | SYSTOLIC BLOOD PRESSURE: 124 MMHG | TEMPERATURE: 97.3 F | BODY MASS INDEX: 42.48 KG/M2 | RESPIRATION RATE: 16 BRPM

## 2019-10-30 DIAGNOSIS — E78.5 HYPERLIPIDEMIA, UNSPECIFIED HYPERLIPIDEMIA TYPE: ICD-10-CM

## 2019-10-30 DIAGNOSIS — H40.9 GLAUCOMA OF BOTH EYES, UNSPECIFIED GLAUCOMA TYPE: ICD-10-CM

## 2019-10-30 DIAGNOSIS — D69.6 THROMBOCYTOPENIA (HCC): ICD-10-CM

## 2019-10-30 DIAGNOSIS — J44.9 CHRONIC OBSTRUCTIVE PULMONARY DISEASE, UNSPECIFIED COPD TYPE (HCC): ICD-10-CM

## 2019-10-30 DIAGNOSIS — G47.09 OTHER INSOMNIA: ICD-10-CM

## 2019-10-30 DIAGNOSIS — I10 ESSENTIAL HYPERTENSION: Primary | ICD-10-CM

## 2019-10-30 DIAGNOSIS — H66.001 NON-RECURRENT ACUTE SUPPURATIVE OTITIS MEDIA OF RIGHT EAR WITHOUT SPONTANEOUS RUPTURE OF TYMPANIC MEMBRANE: ICD-10-CM

## 2019-10-30 DIAGNOSIS — F41.8 ANXIETY WITH DEPRESSION: ICD-10-CM

## 2019-10-30 DIAGNOSIS — E66.01 CLASS 3 SEVERE OBESITY DUE TO EXCESS CALORIES WITH SERIOUS COMORBIDITY AND BODY MASS INDEX (BMI) OF 40.0 TO 44.9 IN ADULT (HCC): ICD-10-CM

## 2019-10-30 DIAGNOSIS — Z13.29 THYROID DISORDER SCREEN: ICD-10-CM

## 2019-10-30 DIAGNOSIS — K21.9 GASTROESOPHAGEAL REFLUX DISEASE WITHOUT ESOPHAGITIS: ICD-10-CM

## 2019-10-30 DIAGNOSIS — H69.83 DYSFUNCTION OF BOTH EUSTACHIAN TUBES: ICD-10-CM

## 2019-10-30 DIAGNOSIS — E66.01 MORBIDLY OBESE (HCC): ICD-10-CM

## 2019-10-30 DIAGNOSIS — J30.2 SEASONAL ALLERGIES: ICD-10-CM

## 2019-10-30 DIAGNOSIS — E03.9 ACQUIRED HYPOTHYROIDISM: ICD-10-CM

## 2019-10-30 DIAGNOSIS — K59.09 OTHER CONSTIPATION: ICD-10-CM

## 2019-10-30 LAB
BILIRUB BLD-MCNC: NEGATIVE MG/DL
CLARITY, POC: CLEAR
COLOR UR: YELLOW
EXPIRATION DATE: ABNORMAL
GLUCOSE UR STRIP-MCNC: NEGATIVE MG/DL
KETONES UR QL: NEGATIVE
LEUKOCYTE EST, POC: ABNORMAL
Lab: ABNORMAL
NITRITE UR-MCNC: NEGATIVE MG/ML
PH UR: 5 [PH] (ref 5–8)
PROT UR STRIP-MCNC: NEGATIVE MG/DL
RBC # UR STRIP: NEGATIVE /UL
SP GR UR: 1.01 (ref 1–1.03)
UROBILINOGEN UR QL: NORMAL

## 2019-10-30 PROCEDURE — 99214 OFFICE O/P EST MOD 30 MIN: CPT | Performed by: NURSE PRACTITIONER

## 2019-10-30 PROCEDURE — 80061 LIPID PANEL: CPT | Performed by: NURSE PRACTITIONER

## 2019-10-30 PROCEDURE — 80053 COMPREHEN METABOLIC PANEL: CPT | Performed by: NURSE PRACTITIONER

## 2019-10-30 PROCEDURE — 84443 ASSAY THYROID STIM HORMONE: CPT | Performed by: NURSE PRACTITIONER

## 2019-10-30 PROCEDURE — 85025 COMPLETE CBC W/AUTO DIFF WBC: CPT | Performed by: NURSE PRACTITIONER

## 2019-10-30 RX ORDER — AMOXICILLIN AND CLAVULANATE POTASSIUM 600; 42.9 MG/5ML; MG/5ML
90 POWDER, FOR SUSPENSION ORAL 2 TIMES DAILY
Qty: 765 ML | Refills: 0 | Status: SHIPPED | OUTPATIENT
Start: 2019-10-30 | End: 2019-10-30

## 2019-10-30 RX ORDER — AMOXICILLIN AND CLAVULANATE POTASSIUM 875; 125 MG/1; MG/1
1 TABLET, FILM COATED ORAL 2 TIMES DAILY
Qty: 20 TABLET | Refills: 0 | Status: SHIPPED | OUTPATIENT
Start: 2019-10-30 | End: 2019-12-22

## 2019-10-30 NOTE — PROGRESS NOTES
Chief Complaint   Patient presents with   • Hypertension   • Hyperlipidemia        Subjective     History of Present Illness   Patient is here today for visit for chronic problems.  The patient has essential hypertension.  Blood pressures are in normal limits here in the office.  She does not check her blood pressure at home.    Patient has no chest pain shortness of breath or swelling.    Hyperlipidemia.  Patient takes statin without abdominal pain or myalgia.  She has morbid obesity with comorbidities.  Body mass index is 42.51 kg/m².  She is down 9 pounds.    Patient does try to watch her diet a little.  She is not exercising.    Patient has insomnia takes trazodone for sleep which is helpful.    Patient has anxiety with depression on Zoloft and Wellbutrin without SI or HI.  There is no exacerbation of her symptoms.    Patient has allergies and takes Zyrtec which controls her symptoms.  She has no exacerbations at this time.    Patient has a history of constipation takes Linzess daily and not needing the additional MiraLAX at this time.      Patient has COPD and reports no exacerbation at this time.She is not needing albuterol.    She does have bilateral ear pain which is new.  Pain is greatest in the right ear.  There is been no treatment of her symptoms.    Patient has glaucoma however she has not been to an ophthalmologist in the last year.  She did not prefer the ophthalmologist she saw last and would like to have a new referral.    Patient has GERD and is on Prevacid with control of her symptoms.    Patient had a mammogram completed at [place] 9/19.  Minimally invasive devices completed 9/19.  I will request records of the patient's mammogram.    The following portions of the patient's history were reviewed and updated as appropriate: allergies, current medications, past family history, past medical history, past social history, past surgical history and problem list.    Review of Systems   Constitutional: Negative for  activity change, appetite change, chills, fatigue and fever.   HENT: Positive for ear pain and sinus pressure. Negative for congestion, dental problem, nosebleeds, postnasal drip and sore throat.    Eyes: Negative for visual disturbance.   Respiratory: Negative for cough and shortness of breath.    Cardiovascular: Negative for chest pain, palpitations and leg swelling.   Gastrointestinal: Negative for abdominal pain, constipation, diarrhea, nausea and GERD.   Musculoskeletal: Negative for arthralgias and myalgias.   Skin: Negative for rash.   Allergic/Immunologic: Negative for environmental allergies.   Neurological: Negative for dizziness.   Psychiatric/Behavioral: Negative for sleep disturbance.   All other systems reviewed and are negative.      Objective   Physical Exam   Constitutional: She is oriented to person, place, and time. She appears well-developed and well-nourished.   HENT:   Head: Normocephalic and atraumatic.   Right Ear: External ear normal.   Left Ear: External ear normal.   Nose: Nose normal.   Bilateral TMs retracted right greater than left with fluid and  erythema posterior pharynx with moderate clear postnasal drainage   Neck: No thyromegaly present.   Cardiovascular: Normal rate, regular rhythm and intact distal pulses.   Pulmonary/Chest: Effort normal and breath sounds normal.   Abdominal: Soft. Bowel sounds are normal. She exhibits no distension. There is no tenderness.   Musculoskeletal: She exhibits no edema.   Lymphadenopathy:     She has no cervical adenopathy.   Neurological: She is alert and oriented to person, place, and time.   Skin: Capillary refill takes 2 to 3 seconds.   Psychiatric: She has a normal mood and affect. Her behavior is normal.   Nursing note and vitals reviewed.              Assessment/Plan   Adrianne was seen today for hypertension and hyperlipidemia.    Diagnoses and all orders for this visit:    Essential hypertension  -     Comprehensive Metabolic Panel  -      POC Urinalysis Dipstick, Automated    Hyperlipidemia, unspecified hyperlipidemia type  -     Lipid Panel    Chronic obstructive pulmonary disease, unspecified COPD type (CMS/[facility])    Gastroesophageal reflux disease without esophagitis  -     CBC & Differential  -     CBC Auto Differential  -     Manual Differential; Future  -     Cancel: Manual Differential    Other constipation    Morbidly obese (CMS/HCC)    Acquired hypothyroidism  -     CBC & Differential  -     CBC Auto Differential  -     Manual Differential; Future  -     Cancel: Manual Differential    Thrombocytopenia (CMS/HCC)  -     CBC & Differential  -     CBC Auto Differential  -     Manual Differential; Future  -     Cancel: Manual Differential    Other insomnia    Seasonal allergies  -     CBC & Differential  -     CBC Auto Differential  -     Manual Differential; Future  -     Cancel: Manual Differential    Thyroid disorder screen  -     TSH    Non-recurrent acute suppurative otitis media of right ear without spontaneous rupture of tympanic membrane  -     Discontinue: amoxicillin-clavulanate (AUGMENTIN ES-600) 600-42.9 MG/5ML suspension; Take 38.25 mL by mouth 2 (Two) Times a Day for 10 days.    Dysfunction of both eustachian tubes  -     Discontinue: amoxicillin-clavulanate (AUGMENTIN ES-600) 600-42.9 MG/5ML suspension; Take 38.25 mL by mouth 2 (Two) Times a Day for 10 days.    Class 3 severe obesity due to excess calories with serious comorbidity and body mass index (BMI) of 40.0 to 44.9 in adult (CMS/[facility])    Glaucoma of both eyes, unspecified glaucoma type  -     Ambulatory Referral to Ophthalmology    Anxiety with depression    Other orders  -     amoxicillin-clavulanate (AUGMENTIN) 875-125 MG per tablet; Take 1 tablet by mouth 2 (Two) Times a Day.        Counseling of diet in reagards to appropriate amounts of fats, carbs, sodium and well -balanced diet.  Avoid skipping meals, appropriate amounts of h20 intake, sleep, and regular cardio activity  30 mintues 5d weekly.  The pt v/u.    ROM with B ETD- has flonase at home - restart  Anxiety and depression are stable on current medication treatment.   Has insomnia continue with trazodone as it is effective.  Constipation controlled with Linzess daily and as needed use of MiraLAX.  COPD not exacerbated at this time.  Patient has not seen ophthalmology this year will refer her back due to the glaucoma  Return in about 3 months (around 1/30/2020) for fasting, Annual.  RTC/call  If symptoms worsen  Meds MOA and SE's reviewed and pt v/u

## 2019-10-30 NOTE — TELEPHONE ENCOUNTER
----- Message from Myrna Hein sent at 10/30/2019  1:12 PM EDT -----  Contact: Yuri Browning in [place]  Received scrip for amoxicillin-clavulanate.  The script received was more than the maximum dosage.      Please call back to verify dosage.      Please call Yuri at [phone].

## 2019-10-31 PROBLEM — E66.01 MORBIDLY OBESE: Status: RESOLVED | Noted: 2018-12-17 | Resolved: 2019-10-31

## 2019-10-31 PROBLEM — E66.01 CLASS 3 SEVERE OBESITY DUE TO EXCESS CALORIES WITH SERIOUS COMORBIDITY AND BODY MASS INDEX (BMI) OF 40.0 TO 44.9 IN ADULT: Status: ACTIVE | Noted: 2019-10-31

## 2019-10-31 LAB
ALBUMIN SERPL-MCNC: 4.4 G/DL (ref 3.5–5.2)
ALBUMIN/GLOB SERPL: 1.3 G/DL
ALP SERPL-CCNC: 91 U/L (ref 39–117)
ALT SERPL W P-5'-P-CCNC: 35 U/L (ref 1–33)
ANION GAP SERPL CALCULATED.3IONS-SCNC: 7.3 MMOL/L (ref 5–15)
AST SERPL-CCNC: 21 U/L (ref 1–32)
BASOPHILS # BLD AUTO: 0.04 10*3/MM3 (ref 0–0.2)
BASOPHILS NFR BLD AUTO: 0.5 % (ref 0–1.5)
BILIRUB SERPL-MCNC: 0.3 MG/DL (ref 0.2–1.2)
BUN BLD-MCNC: 11 MG/DL (ref 6–20)
BUN/CREAT SERPL: 14.3 (ref 7–25)
CALCIUM SPEC-SCNC: 9.8 MG/DL (ref 8.6–10.5)
CHLORIDE SERPL-SCNC: 96 MMOL/L (ref 98–107)
CHOLEST SERPL-MCNC: 160 MG/DL (ref 0–200)
CO2 SERPL-SCNC: 29.7 MMOL/L (ref 22–29)
CREAT BLD-MCNC: 0.77 MG/DL (ref 0.57–1)
DEPRECATED RDW RBC AUTO: 42.2 FL (ref 37–54)
EOSINOPHIL # BLD AUTO: 0.15 10*3/MM3 (ref 0–0.4)
EOSINOPHIL NFR BLD AUTO: 1.9 % (ref 0.3–6.2)
ERYTHROCYTE [DISTWIDTH] IN BLOOD BY AUTOMATED COUNT: 14.4 % (ref 12.3–15.4)
GFR SERPL CREATININE-BSD FRML MDRD: 78 ML/MIN/1.73
GLOBULIN UR ELPH-MCNC: 3.4 GM/DL
GLUCOSE BLD-MCNC: 89 MG/DL (ref 65–99)
HCT VFR BLD AUTO: 41.6 % (ref 34–46.6)
HDLC SERPL-MCNC: 44 MG/DL (ref 40–60)
HGB BLD-MCNC: 14 G/DL (ref 12–15.9)
LDLC SERPL CALC-MCNC: 85 MG/DL (ref 0–100)
LDLC/HDLC SERPL: 1.94 {RATIO}
LYMPHOCYTES # BLD AUTO: 2.15 10*3/MM3 (ref 0.7–3.1)
LYMPHOCYTES NFR BLD AUTO: 27.7 % (ref 19.6–45.3)
MCH RBC QN AUTO: 27.6 PG (ref 26.6–33)
MCHC RBC AUTO-ENTMCNC: 33.7 G/DL (ref 31.5–35.7)
MCV RBC AUTO: 82.1 FL (ref 79–97)
MONOCYTES # BLD AUTO: 0.59 10*3/MM3 (ref 0.1–0.9)
MONOCYTES NFR BLD AUTO: 7.6 % (ref 5–12)
NEUTROPHILS # BLD AUTO: 4.8 10*3/MM3 (ref 1.7–7)
NEUTROPHILS NFR BLD AUTO: 61.9 % (ref 42.7–76)
PLATELET # BLD AUTO: 149 10*3/MM3 (ref 140–450)
PMV BLD AUTO: 14.7 FL (ref 6–12)
POTASSIUM BLD-SCNC: 4.3 MMOL/L (ref 3.5–5.2)
PROT SERPL-MCNC: 7.8 G/DL (ref 6–8.5)
RBC # BLD AUTO: 5.07 10*6/MM3 (ref 3.77–5.28)
SODIUM BLD-SCNC: 133 MMOL/L (ref 136–145)
TRIGL SERPL-MCNC: 154 MG/DL (ref 0–150)
TSH SERPL DL<=0.05 MIU/L-ACNC: 4.08 UIU/ML (ref 0.27–4.2)
VLDLC SERPL-MCNC: 30.8 MG/DL (ref 5–40)
WBC NRBC COR # BLD: 7.76 10*3/MM3 (ref 3.4–10.8)

## 2019-11-01 ENCOUNTER — TELEPHONE (OUTPATIENT)
Dept: INTERNAL MEDICINE | Facility: CLINIC | Age: 54
End: 2019-11-01

## 2019-11-01 NOTE — TELEPHONE ENCOUNTER
Patient states that she received a phone call and is requesting call back.    Please call and advise.

## 2019-11-04 DIAGNOSIS — M54.50 CHRONIC LOW BACK PAIN WITHOUT SCIATICA, UNSPECIFIED BACK PAIN LATERALITY: ICD-10-CM

## 2019-11-04 DIAGNOSIS — G89.29 CHRONIC LOW BACK PAIN WITHOUT SCIATICA, UNSPECIFIED BACK PAIN LATERALITY: ICD-10-CM

## 2019-11-05 RX ORDER — GABAPENTIN 400 MG/1
CAPSULE ORAL
Qty: 90 CAPSULE | Refills: 0 | OUTPATIENT
Start: 2019-11-05

## 2019-11-10 DIAGNOSIS — F32.A DEPRESSION, UNSPECIFIED DEPRESSION TYPE: ICD-10-CM

## 2019-11-10 DIAGNOSIS — E78.5 HYPERLIPIDEMIA, UNSPECIFIED HYPERLIPIDEMIA TYPE: ICD-10-CM

## 2019-11-10 DIAGNOSIS — J30.89 SEASONAL ALLERGIC RHINITIS DUE TO OTHER ALLERGIC TRIGGER: ICD-10-CM

## 2019-11-10 DIAGNOSIS — F41.9 ANXIETY: ICD-10-CM

## 2019-11-10 DIAGNOSIS — J44.9 CHRONIC OBSTRUCTIVE PULMONARY DISEASE, UNSPECIFIED COPD TYPE (HCC): ICD-10-CM

## 2019-11-11 RX ORDER — CETIRIZINE HYDROCHLORIDE 10 MG/1
TABLET ORAL
Qty: 30 TABLET | Refills: 0 | Status: SHIPPED | OUTPATIENT
Start: 2019-11-11 | End: 2019-12-03 | Stop reason: SDUPTHER

## 2019-11-11 RX ORDER — TIOTROPIUM BROMIDE 18 UG/1
CAPSULE ORAL; RESPIRATORY (INHALATION)
Qty: 30 CAPSULE | Refills: 1 | Status: SHIPPED | OUTPATIENT
Start: 2019-11-11 | End: 2019-12-03 | Stop reason: SDUPTHER

## 2019-11-11 RX ORDER — SIMVASTATIN 20 MG
TABLET ORAL
Qty: 30 TABLET | Refills: 1 | Status: SHIPPED | OUTPATIENT
Start: 2019-11-11 | End: 2019-12-03 | Stop reason: SDUPTHER

## 2019-11-11 RX ORDER — FLUTICASONE PROPIONATE 50 MCG
SPRAY, SUSPENSION (ML) NASAL
Qty: 16 G | Refills: 1 | Status: SHIPPED | OUTPATIENT
Start: 2019-11-11 | End: 2019-12-03 | Stop reason: SDUPTHER

## 2019-11-16 DIAGNOSIS — F41.9 ANXIETY: ICD-10-CM

## 2019-11-18 RX ORDER — HYDROXYZINE HYDROCHLORIDE 25 MG/1
TABLET, FILM COATED ORAL
Qty: 30 TABLET | Refills: 1 | Status: SHIPPED | OUTPATIENT
Start: 2019-11-18 | End: 2021-04-26

## 2019-12-03 ENCOUNTER — TELEPHONE (OUTPATIENT)
Dept: INTERNAL MEDICINE | Facility: CLINIC | Age: 54
End: 2019-12-03

## 2019-12-03 DIAGNOSIS — E78.5 HYPERLIPIDEMIA, UNSPECIFIED HYPERLIPIDEMIA TYPE: ICD-10-CM

## 2019-12-03 DIAGNOSIS — F32.A DEPRESSION, UNSPECIFIED DEPRESSION TYPE: ICD-10-CM

## 2019-12-03 DIAGNOSIS — J30.89 SEASONAL ALLERGIC RHINITIS DUE TO OTHER ALLERGIC TRIGGER: ICD-10-CM

## 2019-12-03 DIAGNOSIS — F41.9 ANXIETY: ICD-10-CM

## 2019-12-03 DIAGNOSIS — J44.9 CHRONIC OBSTRUCTIVE PULMONARY DISEASE, UNSPECIFIED COPD TYPE (HCC): ICD-10-CM

## 2019-12-03 PROBLEM — Z91.199 COMPLIANCE POOR: Status: ACTIVE | Noted: 2019-12-03

## 2019-12-03 NOTE — TELEPHONE ENCOUNTER
Received fax from [facility] patient is not taking her Buproprion XL 300mg 24hr tablet. Please advise?

## 2019-12-04 RX ORDER — TIOTROPIUM BROMIDE 18 UG/1
CAPSULE ORAL; RESPIRATORY (INHALATION)
Qty: 30 CAPSULE | Refills: 0 | Status: SHIPPED | OUTPATIENT
Start: 2019-12-04 | End: 2020-01-02

## 2019-12-04 RX ORDER — SIMVASTATIN 20 MG
TABLET ORAL
Qty: 30 TABLET | Refills: 0 | Status: SHIPPED | OUTPATIENT
Start: 2019-12-04 | End: 2020-01-11 | Stop reason: SDUPTHER

## 2019-12-04 RX ORDER — FLUTICASONE PROPIONATE 50 MCG
SPRAY, SUSPENSION (ML) NASAL
Qty: 16 ML | Refills: 0 | Status: SHIPPED | OUTPATIENT
Start: 2019-12-04 | End: 2020-01-02

## 2019-12-04 RX ORDER — CETIRIZINE HYDROCHLORIDE 10 MG/1
TABLET ORAL
Qty: 30 TABLET | Refills: 2 | Status: SHIPPED | OUTPATIENT
Start: 2019-12-04 | End: 2020-02-27

## 2019-12-10 PROBLEM — H40.1132 PRIMARY OPEN ANGLE GLAUCOMA (POAG) OF BOTH EYES, MODERATE STAGE: Status: ACTIVE | Noted: 2018-02-26

## 2019-12-10 PROBLEM — H25.9 AGE-RELATED CATARACT OF BOTH EYES: Status: ACTIVE | Noted: 2019-12-10

## 2019-12-10 PROBLEM — H40.1131 PRIMARY OPEN ANGLE GLAUCOMA (POAG) OF BOTH EYES, MILD STAGE: Status: ACTIVE | Noted: 2018-02-26

## 2019-12-16 ENCOUNTER — OFFICE VISIT (OUTPATIENT)
Dept: INTERNAL MEDICINE | Facility: CLINIC | Age: 54
End: 2019-12-16

## 2019-12-16 VITALS
SYSTOLIC BLOOD PRESSURE: 120 MMHG | OXYGEN SATURATION: 95 % | WEIGHT: 224.13 LBS | DIASTOLIC BLOOD PRESSURE: 78 MMHG | TEMPERATURE: 97.4 F | RESPIRATION RATE: 16 BRPM | HEART RATE: 89 BPM | BODY MASS INDEX: 42.35 KG/M2

## 2019-12-16 DIAGNOSIS — K13.79 LUMP IN MOUTH: Primary | ICD-10-CM

## 2019-12-16 DIAGNOSIS — L70.9 ACNE, UNSPECIFIED ACNE TYPE: ICD-10-CM

## 2019-12-16 PROCEDURE — 99214 OFFICE O/P EST MOD 30 MIN: CPT | Performed by: NURSE PRACTITIONER

## 2019-12-16 RX ORDER — CLINDAMYCIN AND BENZOYL PEROXIDE 10; 50 MG/G; MG/G
GEL TOPICAL 2 TIMES DAILY
Qty: 50 G | Refills: 3 | Status: SHIPPED | OUTPATIENT
Start: 2019-12-16 | End: 2021-04-26

## 2019-12-16 RX ORDER — KETOCONAZOLE 20 MG/ML
SHAMPOO TOPICAL 2 TIMES WEEKLY
Qty: 120 ML | Refills: 1 | Status: SHIPPED | OUTPATIENT
Start: 2019-12-16 | End: 2020-06-30

## 2019-12-16 NOTE — PROGRESS NOTES
Chief Complaint   Patient presents with   • Mouth Lesions     x1mth one at top and one at bottom under tongue        Subjective     History of Present Illness   Patient is here today with complaints of a bump under her tongue on the left side is gotten bigger.  She is also concerned that her face is been breaking out.  She has not tried anything on her face.    The patient reports a bump under tongue L side and gotten bigger over the last few weeks.  No trauma no treatment.    The following portions of the patient's history were reviewed and updated as appropriate: allergies, current medications, past family history, past medical history, past social history, past surgical history and problem list.    Review of Systems   Constitutional: Negative for activity change, appetite change, chills, fatigue and fever.   HENT: Positive for mouth sores. Negative for congestion, postnasal drip and sore throat.    Eyes: Negative for visual disturbance.   Respiratory: Negative for cough and shortness of breath.    Cardiovascular: Negative for chest pain, palpitations and leg swelling.   Gastrointestinal: Negative for abdominal pain, constipation, diarrhea, nausea and GERD.   Musculoskeletal: Negative for arthralgias and myalgias.   Skin: Negative for rash.        Acne   Allergic/Immunologic: Negative for environmental allergies.   Neurological: Negative for dizziness.   Psychiatric/Behavioral: Negative for sleep disturbance.   All other systems reviewed and are negative.      Objective   54-year-old female no acute distress alert oriented x3 well-developed.  HEENT TMs clear nasal mucosa without edema rhinorrhea posterior pharynx without erythema clear postnasal drainage.  Raised area under the tongue left side inner gum 2 mm palpable area not tender to touch.  Neck supple without lymphadenopathy no thyromegaly bilateral breath sounds clear chest clear to auscultate throughout abdomen soft nondistended nontender.  Bowel sounds are  active.  Skin with pustular areas noted sparse across face.          Assessment/Plan   Adrianne was seen today for mouth lesions.    Diagnoses and all orders for this visit:    Lump in mouth  -     Ambulatory Referral to Oral Maxillofacial Surgery    Acne, unspecified acne type  -     clindamycin-benzoyl peroxide (BENZACLIN) 1-5 % gel; Apply  topically to the appropriate area as directed 2 (Two) Times a Day.    Other orders  -     ketoconazole (NIZORAL) 2 % shampoo; Apply  topically to the appropriate area as directed 2 (Two) Times a Week. Apply to affected area      Add over-the-counter benzoyl peroxide such as proactive.  If symptoms not improving for acne she will let me know.    Return if symptoms worsen or fail to improve.  RTC/call  If symptoms worsen  Meds MOA and SE's reviewed and pt v/u

## 2020-01-01 DIAGNOSIS — J30.89 SEASONAL ALLERGIC RHINITIS DUE TO OTHER ALLERGIC TRIGGER: ICD-10-CM

## 2020-01-01 DIAGNOSIS — J44.9 CHRONIC OBSTRUCTIVE PULMONARY DISEASE, UNSPECIFIED COPD TYPE (HCC): ICD-10-CM

## 2020-01-02 DIAGNOSIS — G89.29 CHRONIC LOW BACK PAIN WITHOUT SCIATICA, UNSPECIFIED BACK PAIN LATERALITY: ICD-10-CM

## 2020-01-02 DIAGNOSIS — M54.50 CHRONIC LOW BACK PAIN WITHOUT SCIATICA, UNSPECIFIED BACK PAIN LATERALITY: ICD-10-CM

## 2020-01-02 RX ORDER — TIOTROPIUM BROMIDE 18 UG/1
CAPSULE ORAL; RESPIRATORY (INHALATION)
Qty: 30 CAPSULE | Refills: 0 | Status: SHIPPED | OUTPATIENT
Start: 2020-01-02 | End: 2020-02-03 | Stop reason: SDUPTHER

## 2020-01-02 RX ORDER — FLUTICASONE PROPIONATE 50 MCG
SPRAY, SUSPENSION (ML) NASAL
Qty: 16 G | Refills: 1 | Status: SHIPPED | OUTPATIENT
Start: 2020-01-02 | End: 2020-02-28 | Stop reason: SDUPTHER

## 2020-01-02 NOTE — TELEPHONE ENCOUNTER
Received refill request for gapentin 400 mg capsules.  Last visit:12/16/19  Last Rx:10/17/19  CSA:07/23/19   Jose:07/23/19

## 2020-01-03 RX ORDER — GABAPENTIN 400 MG/1
400 CAPSULE ORAL 3 TIMES DAILY
Qty: 90 CAPSULE | Refills: 2 | Status: SHIPPED | OUTPATIENT
Start: 2020-01-03 | End: 2020-07-06

## 2020-01-11 ENCOUNTER — DOCUMENTATION (OUTPATIENT)
Dept: INTERNAL MEDICINE | Facility: CLINIC | Age: 55
End: 2020-01-11

## 2020-01-11 DIAGNOSIS — F32.A DEPRESSION, UNSPECIFIED DEPRESSION TYPE: ICD-10-CM

## 2020-01-11 DIAGNOSIS — E78.5 HYPERLIPIDEMIA, UNSPECIFIED HYPERLIPIDEMIA TYPE: ICD-10-CM

## 2020-01-11 DIAGNOSIS — F41.9 ANXIETY: ICD-10-CM

## 2020-01-11 DIAGNOSIS — I10 ESSENTIAL HYPERTENSION: ICD-10-CM

## 2020-01-11 DIAGNOSIS — G47.00 INSOMNIA, UNSPECIFIED TYPE: ICD-10-CM

## 2020-01-11 DIAGNOSIS — E03.9 ACQUIRED HYPOTHYROIDISM: ICD-10-CM

## 2020-01-11 DIAGNOSIS — K21.9 GASTROESOPHAGEAL REFLUX DISEASE WITHOUT ESOPHAGITIS: ICD-10-CM

## 2020-01-11 RX ORDER — BUPROPION HYDROCHLORIDE 300 MG/1
300 TABLET ORAL DAILY
Qty: 30 TABLET | Refills: 0 | Status: SHIPPED | OUTPATIENT
Start: 2020-01-11 | End: 2020-02-27

## 2020-01-11 RX ORDER — LEVOTHYROXINE SODIUM 0.03 MG/1
25 TABLET ORAL DAILY
Qty: 30 TABLET | Refills: 0 | Status: SHIPPED | OUTPATIENT
Start: 2020-01-11 | End: 2020-02-22 | Stop reason: SDUPTHER

## 2020-01-11 RX ORDER — LANSOPRAZOLE 30 MG/1
30 CAPSULE, DELAYED RELEASE ORAL DAILY
Qty: 30 CAPSULE | Refills: 0 | Status: SHIPPED | OUTPATIENT
Start: 2020-01-11 | End: 2020-02-18

## 2020-01-11 RX ORDER — SERTRALINE HYDROCHLORIDE 100 MG/1
100 TABLET, FILM COATED ORAL DAILY
Qty: 30 TABLET | Refills: 0 | Status: SHIPPED | OUTPATIENT
Start: 2020-01-11 | End: 2020-01-22 | Stop reason: SDUPTHER

## 2020-01-11 RX ORDER — TRAZODONE HYDROCHLORIDE 50 MG/1
50 TABLET ORAL NIGHTLY
Qty: 30 TABLET | Refills: 0 | Status: SHIPPED | OUTPATIENT
Start: 2020-01-11 | End: 2020-02-21 | Stop reason: SDUPTHER

## 2020-01-11 RX ORDER — SIMVASTATIN 20 MG
20 TABLET ORAL
Qty: 30 TABLET | Refills: 0 | Status: SHIPPED | OUTPATIENT
Start: 2020-01-11 | End: 2020-02-24 | Stop reason: SDUPTHER

## 2020-01-11 RX ORDER — LISINOPRIL 30 MG/1
30 TABLET ORAL
Qty: 30 TABLET | Refills: 0 | Status: SHIPPED | OUTPATIENT
Start: 2020-01-11 | End: 2020-02-11

## 2020-01-22 ENCOUNTER — OFFICE VISIT (OUTPATIENT)
Dept: RETAIL CLINIC | Facility: CLINIC | Age: 55
End: 2020-01-22

## 2020-01-22 VITALS
HEART RATE: 76 BPM | DIASTOLIC BLOOD PRESSURE: 68 MMHG | TEMPERATURE: 98.6 F | RESPIRATION RATE: 20 BRPM | SYSTOLIC BLOOD PRESSURE: 118 MMHG | BODY MASS INDEX: 42.7 KG/M2 | OXYGEN SATURATION: 94 % | WEIGHT: 226 LBS

## 2020-01-22 DIAGNOSIS — J06.9 ACUTE URI: Primary | ICD-10-CM

## 2020-01-22 PROCEDURE — 99213 OFFICE O/P EST LOW 20 MIN: CPT | Performed by: NURSE PRACTITIONER

## 2020-01-22 RX ORDER — BENZONATATE 200 MG/1
200 CAPSULE ORAL 3 TIMES DAILY PRN
Qty: 21 CAPSULE | Refills: 0 | Status: SHIPPED | OUTPATIENT
Start: 2020-01-22 | End: 2020-01-29

## 2020-01-22 RX ORDER — AMOXICILLIN AND CLAVULANATE POTASSIUM 875; 125 MG/1; MG/1
1 TABLET, FILM COATED ORAL 2 TIMES DAILY
Qty: 20 TABLET | Refills: 0 | Status: SHIPPED | OUTPATIENT
Start: 2020-01-22 | End: 2020-02-01

## 2020-01-22 RX ORDER — CLINDAMYCIN PHOSPHATE 10 MG/G
1 GEL TOPICAL 2 TIMES DAILY
COMMUNITY
Start: 2020-01-09 | End: 2020-01-22 | Stop reason: SDUPTHER

## 2020-01-22 NOTE — PROGRESS NOTES
Cough      Subjective   Adrianne Lowe is a 54 y.o. female.     Cough   This is a new problem. Episode onset: 1 week ago. The problem has been gradually worsening. The problem occurs hourly. The cough is productive of sputum. Associated symptoms include ear congestion, shortness of breath (with exertion) and wheezing. Pertinent negatives include no chest pain, chills, ear pain, fever, headaches, heartburn, hemoptysis, myalgias, nasal congestion, postnasal drip, rash, rhinorrhea, sore throat, sweats or weight loss. The symptoms are aggravated by cold air. She has tried nothing for the symptoms. The treatment provided no relief. Her past medical history is significant for COPD and environmental allergies. There is no history of asthma, bronchiectasis, bronchitis, emphysema or pneumonia.        Patient Active Problem List   Diagnosis   • Auditory hallucinations   • Back pain   • Chronic obstructive pulmonary disease (CMS/HCC)   • Anxiety with depression   • Abnormal glucose level   • Gastroesophageal reflux disease without esophagitis   • Internal hemorrhoid   • Hypertension   • Hyperlipidemia   • Insomnia   • Joint stiffness   • Delusional disorder (CMS/HCC)   • Constipation   • Seasonal allergies   • Carpal tunnel syndrome of right wrist   • Primary open angle glaucoma (POAG) of both eyes, moderate stage   • Cataract   • Polyp of sigmoid colon   • Acquired hypothyroidism   • Thrombocytopenia (CMS/HCC)   • Class 3 severe obesity due to excess calories with serious comorbidity and body mass index (BMI) of 40.0 to 44.9 in adult (CMS/HCC)   • Compliance poor   • Age-related cataract of both eyes       Allergies   Allergen Reactions   • Cyclobenzaprine Unknown - Low Severity     Pt cannot remember        Current Outpatient Medications on File Prior to Visit   Medication Sig Dispense Refill   • B Complex Vitamins (VITAMIN B COMPLEX PO) Take 1 tablet by mouth Daily As Needed.     • buPROPion XL (WELLBUTRIN XL) 300 MG  24 hr tablet Take 1 tablet by mouth Daily. 30 tablet 0   • cetirizine (zyrTEC) 10 MG tablet TAKE 1 TABLET BY MOUTH ONCE DAILY 30 tablet 2   • clindamycin-benzoyl peroxide (BENZACLIN) 1-5 % gel Apply  topically to the appropriate area as directed 2 (Two) Times a Day. 50 g 3   • fluticasone (FLONASE) 50 MCG/ACT nasal spray INSTILL 1 SPRAY INTO EACH NOSTRIL ONCE DAILY 16 g 1   • gabapentin (NEURONTIN) 400 MG capsule Take 1 capsule by mouth 3 (Three) Times a Day. 90 capsule 2   • hydrOXYzine (ATARAX) 25 MG tablet take 1 tablet by mouth at bedtime if needed 30 tablet 1   • lansoprazole (PREVACID) 30 MG capsule Take 1 capsule by mouth Daily. 30 capsule 0   • latanoprost (XALATAN) 0.005 % ophthalmic solution Administer 1 drop to both eyes every night.     • LINZESS 72 MCG capsule capsule take 1 capsule by mouth every morning 30 capsule 0   • lisinopril (PRINIVIL,ZESTRIL) 30 MG tablet Take 1 tablet by mouth every night at bedtime. 30 tablet 0   • mometasone-formoterol (DULERA) 100-5 MCG/ACT inhaler      • Multiple Vitamins-Minerals (HAIR/SKIN/NAILS/BIOTIN) tablet Take 1 tablet by mouth Daily As Needed.     • polyethylene glycol (MIRALAX) packet Take 17 g by mouth Daily. 72 packet 2   • sertraline (ZOLOFT) 50 MG tablet Take 50 mg by mouth Daily.     • simvastatin (ZOCOR) 20 MG tablet Take 1 tablet by mouth every night at bedtime. 30 tablet 0   • SPIRIVA HANDIHALER 18 MCG per inhalation capsule INHALE CONTENTS OF 1 CAPSULE BY MOUTH ONCE DAILY 30 capsule 0   • traZODone (DESYREL) 50 MG tablet Take 1 tablet by mouth Every Night. 30 tablet 0   • vitamin B-12 (CYANOCOBALAMIN) 1000 MCG tablet Take 5,000 mcg by mouth Daily As Needed.     • albuterol sulfate  (90 Base) MCG/ACT inhaler Inhale 2 puffs Every 4 (Four) Hours As Needed for Wheezing. 1 inhaler 0   • benzoyl peroxide 5 % gel Apply 1 application topically to the appropriate area as directed 2 (Two) Times a Day. APPLY TO AFFECTED AREA     • ketoconazole (NIZORAL) 2 %  shampoo Apply  topically to the appropriate area as directed 2 (Two) Times a Week. Apply to affected area 120 mL 1   • levothyroxine (SYNTHROID, LEVOTHROID) 25 MCG tablet Take 1 tablet by mouth Daily. 30 tablet 0   • meloxicam (MOBIC) 7.5 MG tablet Take 1 tablet by mouth Daily. 30 tablet 5   • RA FIBER THERAPY 625 MG tablet   1   • [DISCONTINUED] clindamycin (CLINDAGEL) 1 % gel Apply 1 application topically to the appropriate area as directed 2 (Two) Times a Day. APPLY TO AFFECTED AREA     • [DISCONTINUED] Fluticasone-Salmeterol 113-14 MCG/ACT aerosol powder  Inhale 1 dose 2 (Two) Times a Day. 1 each 4   • [DISCONTINUED] sertraline (ZOLOFT) 100 MG tablet Take 1 tablet by mouth Daily. 30 tablet 0     No current facility-administered medications on file prior to visit.        Past Medical History:   Diagnosis Date   • Anxiety    • Depression    • Elevated cholesterol    • Glaucoma    • Heartburn    • Pulmonary emphysema (CMS/HCC)        Past Surgical History:   Procedure Laterality Date   •  SECTION     • CHOLECYSTECTOMY         Family History   Problem Relation Age of Onset   • Hypertension Mother    • Colon cancer Sister    • Lung cancer Sister        Social History     Socioeconomic History   • Marital status:      Spouse name: Not on file   • Number of children: Not on file   • Years of education: Not on file   • Highest education level: Not on file   Tobacco Use   • Smoking status: Former Smoker     Last attempt to quit: 2014     Years since quittin.0   • Smokeless tobacco: Never Used   Substance and Sexual Activity   • Alcohol use: No   • Drug use: No       Travel:  No recent travel within the last 21 days outside the [place] Denies recent travel to one of the following [place]ries:  [place], [place], [place], or [place].  Denies contact with anyone who has traveled to one of the following [place]  Countries: [place], [place], [place], or [place] within the last 21 days and is  known or suspected to have Ebola.  Denies having had any contact with the human remains, blood or any bodily fluids of someone who is known or suspected to have Ebola within the last 21 days.     OB History    None         Review of Systems   Constitutional: Negative for chills, fever and weight loss.   HENT: Positive for congestion. Negative for ear pain, postnasal drip, rhinorrhea, sinus pressure, sinus pain, sneezing and sore throat.    Respiratory: Positive for cough, chest tightness, shortness of breath (with exertion) and wheezing. Negative for hemoptysis.    Cardiovascular: Negative for chest pain.   Gastrointestinal: Negative for heartburn.   Musculoskeletal: Negative for myalgias.   Skin: Negative for rash.   Allergic/Immunologic: Positive for environmental allergies.   Neurological: Negative for headaches.   All other systems reviewed and are negative.      /68   Pulse 76   Temp 98.6 °F (37 °C) (Oral)   Resp 20   Wt 103 kg (226 lb)   LMP 09/01/2013   SpO2 94%   Breastfeeding No   BMI 42.70 kg/m²     Objective   Physical Exam   Constitutional: She is oriented to person, place, and time. She appears well-developed and well-nourished. No distress.   HENT:   Head: Normocephalic and atraumatic.   Right Ear: Hearing, external ear and ear canal normal. Tympanic membrane is injected.   Left Ear: Hearing, external ear and ear canal normal. Tympanic membrane is injected.   Nose: Nose normal. No mucosal edema or rhinorrhea.   Mouth/Throat: Uvula is midline and mucous membranes are normal. Posterior oropharyngeal erythema present. No oropharyngeal exudate, posterior oropharyngeal edema or tonsillar abscesses.   Eyes: Pupils are equal, round, and reactive to light. Conjunctivae and EOM are normal. Right eye exhibits no discharge. Left eye exhibits no discharge. No scleral icterus.   Neck: Normal range of motion. Neck supple.   Cardiovascular: Normal rate, regular rhythm and normal heart sounds.    Pulmonary/Chest: Effort normal. No accessory muscle usage or stridor. No tachypnea. No respiratory distress. She has wheezes in the right upper field, the right middle field, the right lower field, the left upper field, the left middle field and the left lower field. She has no rhonchi. She has rales in the right lower field and the left lower field.   Congested cough noted on exam   Musculoskeletal: Normal range of motion.   Lymphadenopathy:     She has no cervical adenopathy.   Neurological: She is alert and oriented to person, place, and time.   Skin: Skin is warm and dry. No rash noted. She is not diaphoretic.   Psychiatric: She has a normal mood and affect. Her behavior is normal.   Vitals reviewed.      Assessment/Plan   Adrianne was seen today for cough.    Diagnoses and all orders for this visit:    Acute URI  -     amoxicillin-clavulanate (AUGMENTIN) 875-125 MG per tablet; Take 1 tablet by mouth 2 (Two) Times a Day for 10 days.  -     benzonatate (TESSALON) 200 MG capsule; Take 1 capsule by mouth 3 (Three) Times a Day As Needed for Cough for up to 7 days.    Plan of care discussed with patient: patient states she has an albuterol inhaler but she has not been using it. Instructed her to use it as indicated for shortness of breath and wheezing; increase water intake; use tylenol for pain; take abx to completion; follow up with PCP for no improvement in 2-3 days; go to ER for new or worsening symptoms. Verbalized understanding.        No follow-ups on file.

## 2020-01-22 NOTE — PATIENT INSTRUCTIONS
"Upper Respiratory Infection, Adult  An upper respiratory infection (URI) affects the nose, throat, and upper air passages. URIs are caused by germs (viruses). The most common type of URI is often called \"the common cold.\"  Medicines cannot cure URIs, but you can do things at home to relieve your symptoms. URIs usually get better within 7-10 days.  Follow these instructions at home:  Activity  · Rest as needed.  · If you have a fever, stay home from work or school until your fever is gone, or until your doctor says you may return to work or school.  ? You should stay home until you cannot spread the infection anymore (you are not contagious).  ? Your doctor may have you wear a face mask so you have less risk of spreading the infection.  Relieving symptoms  · Gargle with a salt-water mixture 3-4 times a day or as needed. To make a salt-water mixture, completely dissolve ½-1 tsp of salt in 1 cup of warm water.  · Use a cool-mist humidifier to add moisture to the air. This can help you breathe more easily.  Eating and drinking    · Drink enough fluid to keep your pee (urine) pale yellow.  · Eat soups and other clear broths.  General instructions    · Take over-the-counter and prescription medicines only as told by your doctor. These include cold medicines, fever reducers, and cough suppressants.  · Do not use any products that contain nicotine or tobacco. These include cigarettes and e-cigarettes. If you need help quitting, ask your doctor.  · Avoid being where people are smoking (avoid secondhand smoke).  · Make sure you get regular shots and get the flu shot every year.  · Keep all follow-up visits as told by your doctor. This is important.  How to avoid spreading infection to others    · Wash your hands often with soap and water. If you do not have soap and water, use hand .  · Avoid touching your mouth, face, eyes, or nose.  · Cough or sneeze into a tissue or your sleeve or elbow. Do not cough or sneeze " "into your hand or into the air.  Contact a doctor if:  · You are getting worse, not better.  · You have any of these:  ? A fever.  ? Chills.  ? Brown or red mucus in your nose.  ? Yellow or brown fluid (discharge)coming from your nose.  ? Pain in your face, especially when you bend forward.  ? Swollen neck glands.  ? Pain with swallowing.  ? White areas in the back of your throat.  Get help right away if:  · You have shortness of breath that gets worse.  · You have very bad or constant:  ? Headache.  ? Ear pain.  ? Pain in your forehead, behind your eyes, and over your cheekbones (sinus pain).  ? Chest pain.  · You have long-lasting (chronic) lung disease along with any of these:  ? Wheezing.  ? Long-lasting cough.  ? Coughing up blood.  ? A change in your usual mucus.  · You have a stiff neck.  · You have changes in your:  ? Vision.  ? Hearing.  ? Thinking.  ? Mood.  Summary  · An upper respiratory infection (URI) is caused by a germ called a virus. The most common type of URI is often called \"the common cold.\"  · URIs usually get better within 7-10 days.  · Take over-the-counter and prescription medicines only as told by your doctor.  This information is not intended to replace advice given to you by your health care provider. Make sure you discuss any questions you have with your health care provider.  Document Released: 06/05/2009 Document Revised: 08/10/2018 Document Reviewed: 08/10/2018  REM[employer] Interactive Patient Education © 2019 [employer]    "

## 2020-02-03 DIAGNOSIS — J44.9 CHRONIC OBSTRUCTIVE PULMONARY DISEASE, UNSPECIFIED COPD TYPE (HCC): ICD-10-CM

## 2020-02-11 DIAGNOSIS — I10 ESSENTIAL HYPERTENSION: ICD-10-CM

## 2020-02-11 RX ORDER — LISINOPRIL 30 MG/1
30 TABLET ORAL
Qty: 30 TABLET | Refills: 0 | Status: SHIPPED | OUTPATIENT
Start: 2020-02-11 | End: 2020-03-17

## 2020-02-18 DIAGNOSIS — K21.9 GASTROESOPHAGEAL REFLUX DISEASE WITHOUT ESOPHAGITIS: ICD-10-CM

## 2020-02-18 RX ORDER — LANSOPRAZOLE 30 MG/1
30 CAPSULE, DELAYED RELEASE ORAL DAILY
Qty: 30 CAPSULE | Refills: 0 | Status: SHIPPED | OUTPATIENT
Start: 2020-02-18 | End: 2020-03-18

## 2020-02-21 ENCOUNTER — OFFICE VISIT (OUTPATIENT)
Dept: INTERNAL MEDICINE | Facility: CLINIC | Age: 55
End: 2020-02-21

## 2020-02-21 VITALS
HEART RATE: 77 BPM | HEIGHT: 61 IN | RESPIRATION RATE: 18 BRPM | TEMPERATURE: 97.5 F | BODY MASS INDEX: 41.75 KG/M2 | SYSTOLIC BLOOD PRESSURE: 120 MMHG | WEIGHT: 221.13 LBS | DIASTOLIC BLOOD PRESSURE: 80 MMHG | OXYGEN SATURATION: 97 %

## 2020-02-21 DIAGNOSIS — K21.9 GASTROESOPHAGEAL REFLUX DISEASE WITHOUT ESOPHAGITIS: ICD-10-CM

## 2020-02-21 DIAGNOSIS — E03.9 ACQUIRED HYPOTHYROIDISM: ICD-10-CM

## 2020-02-21 DIAGNOSIS — N64.4 BREAST PAIN, RIGHT: ICD-10-CM

## 2020-02-21 DIAGNOSIS — J30.2 SEASONAL ALLERGIES: ICD-10-CM

## 2020-02-21 DIAGNOSIS — J44.9 CHRONIC OBSTRUCTIVE PULMONARY DISEASE, UNSPECIFIED COPD TYPE (HCC): ICD-10-CM

## 2020-02-21 DIAGNOSIS — M54.50 CHRONIC LOW BACK PAIN WITHOUT SCIATICA, UNSPECIFIED BACK PAIN LATERALITY: ICD-10-CM

## 2020-02-21 DIAGNOSIS — E66.01 CLASS 3 SEVERE OBESITY DUE TO EXCESS CALORIES WITH SERIOUS COMORBIDITY AND BODY MASS INDEX (BMI) OF 40.0 TO 44.9 IN ADULT (HCC): ICD-10-CM

## 2020-02-21 DIAGNOSIS — E78.5 HYPERLIPIDEMIA, UNSPECIFIED HYPERLIPIDEMIA TYPE: ICD-10-CM

## 2020-02-21 DIAGNOSIS — F41.8 ANXIETY WITH DEPRESSION: ICD-10-CM

## 2020-02-21 DIAGNOSIS — G47.00 INSOMNIA, UNSPECIFIED TYPE: ICD-10-CM

## 2020-02-21 DIAGNOSIS — G89.29 CHRONIC LOW BACK PAIN WITHOUT SCIATICA, UNSPECIFIED BACK PAIN LATERALITY: ICD-10-CM

## 2020-02-21 DIAGNOSIS — Z91.199 COMPLIANCE POOR: ICD-10-CM

## 2020-02-21 DIAGNOSIS — I10 ESSENTIAL HYPERTENSION: Primary | ICD-10-CM

## 2020-02-21 LAB
ALBUMIN SERPL-MCNC: 4.7 G/DL (ref 3.5–5.2)
ALBUMIN/GLOB SERPL: 1.6 G/DL
ALP SERPL-CCNC: 93 U/L (ref 39–117)
ALT SERPL W P-5'-P-CCNC: 31 U/L (ref 1–33)
ANION GAP SERPL CALCULATED.3IONS-SCNC: 14.6 MMOL/L (ref 5–15)
AST SERPL-CCNC: 27 U/L (ref 1–32)
BASOPHILS # BLD AUTO: 0.04 10*3/MM3 (ref 0–0.2)
BASOPHILS NFR BLD AUTO: 0.5 % (ref 0–1.5)
BILIRUB BLD-MCNC: NEGATIVE MG/DL
BILIRUB SERPL-MCNC: 0.3 MG/DL (ref 0.2–1.2)
BUN BLD-MCNC: 9 MG/DL (ref 6–20)
BUN/CREAT SERPL: 13.4 (ref 7–25)
CALCIUM SPEC-SCNC: 10 MG/DL (ref 8.6–10.5)
CHLORIDE SERPL-SCNC: 96 MMOL/L (ref 98–107)
CHOLEST SERPL-MCNC: 164 MG/DL (ref 0–200)
CLARITY, POC: CLEAR
CO2 SERPL-SCNC: 27.4 MMOL/L (ref 22–29)
COLOR UR: YELLOW
CREAT BLD-MCNC: 0.67 MG/DL (ref 0.57–1)
DEPRECATED RDW RBC AUTO: 42.2 FL (ref 37–54)
EOSINOPHIL # BLD AUTO: 0.15 10*3/MM3 (ref 0–0.4)
EOSINOPHIL NFR BLD AUTO: 1.7 % (ref 0.3–6.2)
ERYTHROCYTE [DISTWIDTH] IN BLOOD BY AUTOMATED COUNT: 14 % (ref 12.3–15.4)
EXPIRATION DATE: NORMAL
GFR SERPL CREATININE-BSD FRML MDRD: 91 ML/MIN/1.73
GLOBULIN UR ELPH-MCNC: 3 GM/DL
GLUCOSE BLD-MCNC: 85 MG/DL (ref 65–99)
GLUCOSE UR STRIP-MCNC: NEGATIVE MG/DL
HCT VFR BLD AUTO: 43.5 % (ref 34–46.6)
HDLC SERPL-MCNC: 56 MG/DL (ref 40–60)
HGB BLD-MCNC: 14.4 G/DL (ref 12–15.9)
IMM GRANULOCYTES # BLD AUTO: 0.03 10*3/MM3 (ref 0–0.05)
IMM GRANULOCYTES NFR BLD AUTO: 0.3 % (ref 0–0.5)
KETONES UR QL: NEGATIVE
LDLC SERPL CALC-MCNC: 77 MG/DL (ref 0–100)
LDLC/HDLC SERPL: 1.38 {RATIO}
LEUKOCYTE EST, POC: NEGATIVE
LYMPHOCYTES # BLD AUTO: 2.95 10*3/MM3 (ref 0.7–3.1)
LYMPHOCYTES NFR BLD AUTO: 33.9 % (ref 19.6–45.3)
Lab: NORMAL
MCH RBC QN AUTO: 27.6 PG (ref 26.6–33)
MCHC RBC AUTO-ENTMCNC: 33.1 G/DL (ref 31.5–35.7)
MCV RBC AUTO: 83.5 FL (ref 79–97)
MONOCYTES # BLD AUTO: 0.62 10*3/MM3 (ref 0.1–0.9)
MONOCYTES NFR BLD AUTO: 7.1 % (ref 5–12)
NEUTROPHILS # BLD AUTO: 4.9 10*3/MM3 (ref 1.7–7)
NEUTROPHILS NFR BLD AUTO: 56.5 % (ref 42.7–76)
NITRITE UR-MCNC: NEGATIVE MG/ML
NRBC BLD AUTO-RTO: 0 /100 WBC (ref 0–0.2)
PH UR: 5 [PH] (ref 5–8)
PLATELET # BLD AUTO: 181 10*3/MM3 (ref 140–450)
PMV BLD AUTO: 14.4 FL (ref 6–12)
POTASSIUM BLD-SCNC: 4.1 MMOL/L (ref 3.5–5.2)
PROT SERPL-MCNC: 7.7 G/DL (ref 6–8.5)
PROT UR STRIP-MCNC: NEGATIVE MG/DL
RBC # BLD AUTO: 5.21 10*6/MM3 (ref 3.77–5.28)
RBC # UR STRIP: NEGATIVE /UL
SODIUM BLD-SCNC: 138 MMOL/L (ref 136–145)
SP GR UR: 1.01 (ref 1–1.03)
TRIGL SERPL-MCNC: 154 MG/DL (ref 0–150)
TSH SERPL DL<=0.05 MIU/L-ACNC: 5.26 UIU/ML (ref 0.27–4.2)
UROBILINOGEN UR QL: NORMAL
VLDLC SERPL-MCNC: 30.8 MG/DL (ref 5–40)
WBC NRBC COR # BLD: 8.69 10*3/MM3 (ref 3.4–10.8)

## 2020-02-21 PROCEDURE — 80061 LIPID PANEL: CPT | Performed by: NURSE PRACTITIONER

## 2020-02-21 PROCEDURE — 84443 ASSAY THYROID STIM HORMONE: CPT | Performed by: NURSE PRACTITIONER

## 2020-02-21 PROCEDURE — 99213 OFFICE O/P EST LOW 20 MIN: CPT | Performed by: NURSE PRACTITIONER

## 2020-02-21 PROCEDURE — 85025 COMPLETE CBC W/AUTO DIFF WBC: CPT | Performed by: NURSE PRACTITIONER

## 2020-02-21 PROCEDURE — 80053 COMPREHEN METABOLIC PANEL: CPT | Performed by: NURSE PRACTITIONER

## 2020-02-21 PROCEDURE — 99396 PREV VISIT EST AGE 40-64: CPT | Performed by: NURSE PRACTITIONER

## 2020-02-21 RX ORDER — MELOXICAM 7.5 MG/1
7.5 TABLET ORAL DAILY
Qty: 30 TABLET | Refills: 5 | Status: SHIPPED | OUTPATIENT
Start: 2020-02-21 | End: 2020-09-01

## 2020-02-21 RX ORDER — TRAZODONE HYDROCHLORIDE 50 MG/1
50 TABLET ORAL NIGHTLY
Qty: 30 TABLET | Refills: 0 | Status: SHIPPED | OUTPATIENT
Start: 2020-02-21 | End: 2020-02-27

## 2020-02-22 DIAGNOSIS — E03.9 ACQUIRED HYPOTHYROIDISM: ICD-10-CM

## 2020-02-22 PROBLEM — D69.6 THROMBOCYTOPENIA (HCC): Status: RESOLVED | Noted: 2019-09-07 | Resolved: 2020-02-22

## 2020-02-22 RX ORDER — LEVOTHYROXINE SODIUM 0.05 MG/1
50 TABLET ORAL DAILY
Qty: 30 TABLET | Refills: 1 | Status: SHIPPED | OUTPATIENT
Start: 2020-02-22 | End: 2020-08-30 | Stop reason: SDUPTHER

## 2020-02-24 DIAGNOSIS — E78.5 HYPERLIPIDEMIA, UNSPECIFIED HYPERLIPIDEMIA TYPE: ICD-10-CM

## 2020-02-24 RX ORDER — SIMVASTATIN 20 MG
20 TABLET ORAL
Qty: 30 TABLET | Refills: 3 | Status: SHIPPED | OUTPATIENT
Start: 2020-02-24 | End: 2020-09-10 | Stop reason: SDUPTHER

## 2020-02-24 NOTE — TELEPHONE ENCOUNTER
You are correct that atorvastatin has not been prescribed.  She has been prescribed simvastatin.  Please call the patient pharmacy to clarify.  If she needs refills will be glad to refill patient's simvastatin.

## 2020-02-24 NOTE — TELEPHONE ENCOUNTER
Received a fax request from [facility] for patients Atorvastatin 20mg but it looks like it has never been prescribed. Please advise?

## 2020-02-27 DIAGNOSIS — E03.9 ACQUIRED HYPOTHYROIDISM: ICD-10-CM

## 2020-02-27 DIAGNOSIS — G47.00 INSOMNIA, UNSPECIFIED TYPE: ICD-10-CM

## 2020-02-27 DIAGNOSIS — F41.9 ANXIETY: ICD-10-CM

## 2020-02-27 DIAGNOSIS — F32.A DEPRESSION, UNSPECIFIED DEPRESSION TYPE: ICD-10-CM

## 2020-02-27 RX ORDER — BUPROPION HYDROCHLORIDE 300 MG/1
300 TABLET ORAL DAILY
Qty: 30 TABLET | Refills: 0 | Status: SHIPPED | OUTPATIENT
Start: 2020-02-27 | End: 2020-02-28 | Stop reason: SDUPTHER

## 2020-02-27 RX ORDER — TRAZODONE HYDROCHLORIDE 50 MG/1
50 TABLET ORAL NIGHTLY
Qty: 30 TABLET | Refills: 0 | Status: SHIPPED | OUTPATIENT
Start: 2020-02-27 | End: 2020-05-08

## 2020-02-27 RX ORDER — LEVOTHYROXINE SODIUM 0.03 MG/1
25 TABLET ORAL DAILY
Qty: 30 TABLET | Refills: 0 | Status: SHIPPED | OUTPATIENT
Start: 2020-02-27 | End: 2020-03-17

## 2020-02-27 RX ORDER — CETIRIZINE HYDROCHLORIDE 10 MG/1
TABLET ORAL
Qty: 30 TABLET | Refills: 4 | Status: SHIPPED | OUTPATIENT
Start: 2020-02-27 | End: 2020-10-04

## 2020-02-27 RX ORDER — SERTRALINE HYDROCHLORIDE 100 MG/1
100 TABLET, FILM COATED ORAL DAILY
Qty: 30 TABLET | Refills: 0 | Status: SHIPPED | OUTPATIENT
Start: 2020-02-27 | End: 2020-03-17

## 2020-02-28 DIAGNOSIS — F32.A DEPRESSION, UNSPECIFIED DEPRESSION TYPE: ICD-10-CM

## 2020-02-28 DIAGNOSIS — J30.89 SEASONAL ALLERGIC RHINITIS DUE TO OTHER ALLERGIC TRIGGER: ICD-10-CM

## 2020-02-28 DIAGNOSIS — F41.9 ANXIETY: ICD-10-CM

## 2020-02-28 RX ORDER — FLUTICASONE PROPIONATE 50 MCG
1 SPRAY, SUSPENSION (ML) NASAL DAILY
Qty: 16 G | Refills: 11 | Status: SHIPPED | OUTPATIENT
Start: 2020-02-28 | End: 2021-03-08

## 2020-02-28 RX ORDER — BUPROPION HYDROCHLORIDE 300 MG/1
300 TABLET ORAL DAILY
Qty: 30 TABLET | Refills: 5 | Status: SHIPPED | OUTPATIENT
Start: 2020-02-28 | End: 2020-04-15

## 2020-03-17 DIAGNOSIS — E03.9 ACQUIRED HYPOTHYROIDISM: ICD-10-CM

## 2020-03-17 DIAGNOSIS — F32.A DEPRESSION, UNSPECIFIED DEPRESSION TYPE: ICD-10-CM

## 2020-03-17 DIAGNOSIS — I10 ESSENTIAL HYPERTENSION: ICD-10-CM

## 2020-03-17 DIAGNOSIS — K21.9 GASTROESOPHAGEAL REFLUX DISEASE WITHOUT ESOPHAGITIS: ICD-10-CM

## 2020-03-17 DIAGNOSIS — F41.9 ANXIETY: ICD-10-CM

## 2020-03-17 RX ORDER — SERTRALINE HYDROCHLORIDE 100 MG/1
100 TABLET, FILM COATED ORAL DAILY
Qty: 30 TABLET | Refills: 0 | Status: SHIPPED | OUTPATIENT
Start: 2020-03-17 | End: 2020-05-08

## 2020-03-17 RX ORDER — LISINOPRIL 30 MG/1
30 TABLET ORAL
Qty: 30 TABLET | Refills: 0 | Status: SHIPPED | OUTPATIENT
Start: 2020-03-17 | End: 2020-04-15

## 2020-03-17 RX ORDER — LEVOTHYROXINE SODIUM 0.03 MG/1
25 TABLET ORAL DAILY
Qty: 30 TABLET | Refills: 0 | Status: SHIPPED | OUTPATIENT
Start: 2020-03-17 | End: 2020-05-08

## 2020-03-18 RX ORDER — LANSOPRAZOLE 30 MG/1
30 CAPSULE, DELAYED RELEASE ORAL DAILY
Qty: 30 CAPSULE | Refills: 0 | Status: SHIPPED | OUTPATIENT
Start: 2020-03-18 | End: 2021-03-08 | Stop reason: SDUPTHER

## 2020-03-31 ENCOUNTER — APPOINTMENT (OUTPATIENT)
Dept: MAMMOGRAPHY | Facility: HOSPITAL | Age: 55
End: 2020-03-31

## 2020-04-15 DIAGNOSIS — F41.9 ANXIETY: ICD-10-CM

## 2020-04-15 DIAGNOSIS — F32.A DEPRESSION, UNSPECIFIED DEPRESSION TYPE: ICD-10-CM

## 2020-04-15 DIAGNOSIS — I10 ESSENTIAL HYPERTENSION: ICD-10-CM

## 2020-04-15 RX ORDER — LISINOPRIL 30 MG/1
30 TABLET ORAL
Qty: 30 TABLET | Refills: 0 | Status: SHIPPED | OUTPATIENT
Start: 2020-04-15 | End: 2020-05-18

## 2020-04-15 RX ORDER — BUPROPION HYDROCHLORIDE 300 MG/1
300 TABLET ORAL DAILY
Qty: 30 TABLET | Refills: 5 | Status: SHIPPED | OUTPATIENT
Start: 2020-04-15 | End: 2021-03-08 | Stop reason: SDUPTHER

## 2020-04-23 NOTE — TELEPHONE ENCOUNTER
Jose- 07/23/2019  CSA- 07/23/2019      Last prescribed 07/23/2019 #90 no refills  Last seen in office 07/23/2019   Patient called. Informed her of information from Dr. Smith from 4/23/20.at 2:53 pm.    She states she does not want to go anywhere with the coronavirus if at all possible. Rates the back pain 6 out of 10. Tylenol, heat, ice, patches are not helping. Has it mostly at night making sleeping difficult. Right stomach pain better from last week. Aware Dr. Smith is not in until Monday. Asking if there is anything he can give her next week for pain when he is in office.     Informed patient Dr. Smith nurse will call next week.

## 2020-05-07 DIAGNOSIS — E03.9 ACQUIRED HYPOTHYROIDISM: ICD-10-CM

## 2020-05-07 DIAGNOSIS — F32.A DEPRESSION, UNSPECIFIED DEPRESSION TYPE: ICD-10-CM

## 2020-05-07 DIAGNOSIS — F41.9 ANXIETY: ICD-10-CM

## 2020-05-07 DIAGNOSIS — G47.00 INSOMNIA, UNSPECIFIED TYPE: ICD-10-CM

## 2020-05-08 RX ORDER — SERTRALINE HYDROCHLORIDE 100 MG/1
100 TABLET, FILM COATED ORAL DAILY
Qty: 30 TABLET | Refills: 0 | Status: SHIPPED | OUTPATIENT
Start: 2020-05-08 | End: 2021-04-26

## 2020-05-08 RX ORDER — TRAZODONE HYDROCHLORIDE 50 MG/1
50 TABLET ORAL NIGHTLY
Qty: 30 TABLET | Refills: 0 | Status: SHIPPED | OUTPATIENT
Start: 2020-05-08 | End: 2020-06-24 | Stop reason: SDUPTHER

## 2020-05-08 RX ORDER — LEVOTHYROXINE SODIUM 0.03 MG/1
25 TABLET ORAL DAILY
Qty: 30 TABLET | Refills: 0 | Status: SHIPPED | OUTPATIENT
Start: 2020-05-08 | End: 2020-08-25

## 2020-05-17 DIAGNOSIS — I10 ESSENTIAL HYPERTENSION: ICD-10-CM

## 2020-05-18 RX ORDER — LISINOPRIL 30 MG/1
30 TABLET ORAL
Qty: 30 TABLET | Refills: 0 | Status: SHIPPED | OUTPATIENT
Start: 2020-05-18 | End: 2020-06-24 | Stop reason: SDUPTHER

## 2020-05-21 ENCOUNTER — OFFICE VISIT (OUTPATIENT)
Dept: INTERNAL MEDICINE | Facility: CLINIC | Age: 55
End: 2020-05-21

## 2020-05-21 ENCOUNTER — HOSPITAL ENCOUNTER (OUTPATIENT)
Dept: GENERAL RADIOLOGY | Facility: HOSPITAL | Age: 55
Discharge: HOME | End: 2020-05-21
Admitting: NURSE PRACTITIONER

## 2020-05-21 VITALS
WEIGHT: 223 LBS | TEMPERATURE: 97.3 F | RESPIRATION RATE: 16 BRPM | SYSTOLIC BLOOD PRESSURE: 110 MMHG | DIASTOLIC BLOOD PRESSURE: 70 MMHG | BODY MASS INDEX: 42.1 KG/M2 | HEART RATE: 77 BPM | HEIGHT: 61 IN | OXYGEN SATURATION: 97 %

## 2020-05-21 DIAGNOSIS — E66.01 CLASS 3 SEVERE OBESITY DUE TO EXCESS CALORIES WITH SERIOUS COMORBIDITY AND BODY MASS INDEX (BMI) OF 40.0 TO 44.9 IN ADULT (HCC): ICD-10-CM

## 2020-05-21 DIAGNOSIS — J44.9 CHRONIC OBSTRUCTIVE PULMONARY DISEASE, UNSPECIFIED COPD TYPE (HCC): ICD-10-CM

## 2020-05-21 DIAGNOSIS — D69.6 THROMBOCYTOPENIA (HCC): ICD-10-CM

## 2020-05-21 DIAGNOSIS — I10 ESSENTIAL HYPERTENSION: ICD-10-CM

## 2020-05-21 DIAGNOSIS — Z00.00 ANNUAL PHYSICAL EXAM: Primary | ICD-10-CM

## 2020-05-21 DIAGNOSIS — K59.00 CONSTIPATION, UNSPECIFIED CONSTIPATION TYPE: ICD-10-CM

## 2020-05-21 DIAGNOSIS — K64.8 INTERNAL HEMORRHOID: ICD-10-CM

## 2020-05-21 DIAGNOSIS — E03.9 ACQUIRED HYPOTHYROIDISM: ICD-10-CM

## 2020-05-21 DIAGNOSIS — K21.9 GASTROESOPHAGEAL REFLUX DISEASE WITHOUT ESOPHAGITIS: ICD-10-CM

## 2020-05-21 DIAGNOSIS — Z91.199 COMPLIANCE POOR: ICD-10-CM

## 2020-05-21 DIAGNOSIS — E78.5 HYPERLIPIDEMIA, UNSPECIFIED HYPERLIPIDEMIA TYPE: ICD-10-CM

## 2020-05-21 DIAGNOSIS — M25.561 ACUTE PAIN OF RIGHT KNEE: ICD-10-CM

## 2020-05-21 DIAGNOSIS — F41.8 ANXIETY WITH DEPRESSION: ICD-10-CM

## 2020-05-21 DIAGNOSIS — K59.09 OTHER CONSTIPATION: ICD-10-CM

## 2020-05-21 LAB
ALBUMIN SERPL-MCNC: 4.4 G/DL (ref 3.5–5.2)
ALBUMIN/GLOB SERPL: 2.1 G/DL
ALP SERPL-CCNC: 91 U/L (ref 39–117)
ALT SERPL W P-5'-P-CCNC: 53 U/L (ref 1–33)
ANION GAP SERPL CALCULATED.3IONS-SCNC: 10.1 MMOL/L (ref 5–15)
AST SERPL-CCNC: 29 U/L (ref 1–32)
BASOPHILS # BLD AUTO: 0.04 10*3/MM3 (ref 0–0.2)
BASOPHILS NFR BLD AUTO: 0.6 % (ref 0–1.5)
BILIRUB BLD-MCNC: NEGATIVE MG/DL
BILIRUB SERPL-MCNC: 0.2 MG/DL (ref 0.2–1.2)
BUN BLD-MCNC: 14 MG/DL (ref 6–20)
BUN/CREAT SERPL: 23.7 (ref 7–25)
CALCIUM SPEC-SCNC: 9 MG/DL (ref 8.6–10.5)
CHLORIDE SERPL-SCNC: 103 MMOL/L (ref 98–107)
CLARITY, POC: CLEAR
CO2 SERPL-SCNC: 27.9 MMOL/L (ref 22–29)
COLOR UR: YELLOW
CREAT BLD-MCNC: 0.59 MG/DL (ref 0.57–1)
DEPRECATED RDW RBC AUTO: 40.5 FL (ref 37–54)
EOSINOPHIL # BLD AUTO: 0.14 10*3/MM3 (ref 0–0.4)
EOSINOPHIL NFR BLD AUTO: 2.1 % (ref 0.3–6.2)
ERYTHROCYTE [DISTWIDTH] IN BLOOD BY AUTOMATED COUNT: 13.8 % (ref 12.3–15.4)
EXPIRATION DATE: NORMAL
GFR SERPL CREATININE-BSD FRML MDRD: 106 ML/MIN/1.73
GLOBULIN UR ELPH-MCNC: 2.1 GM/DL
GLUCOSE BLD-MCNC: 103 MG/DL (ref 65–99)
GLUCOSE UR STRIP-MCNC: NEGATIVE MG/DL
HCT VFR BLD AUTO: 39.3 % (ref 34–46.6)
HGB BLD-MCNC: 13.1 G/DL (ref 12–15.9)
KETONES UR QL: NEGATIVE
LEUKOCYTE EST, POC: NEGATIVE
LYMPHOCYTES # BLD AUTO: 2.12 10*3/MM3 (ref 0.7–3.1)
LYMPHOCYTES NFR BLD AUTO: 31.4 % (ref 19.6–45.3)
Lab: NORMAL
MCH RBC QN AUTO: 27.4 PG (ref 26.6–33)
MCHC RBC AUTO-ENTMCNC: 33.3 G/DL (ref 31.5–35.7)
MCV RBC AUTO: 82.2 FL (ref 79–97)
MONOCYTES # BLD AUTO: 0.54 10*3/MM3 (ref 0.1–0.9)
MONOCYTES NFR BLD AUTO: 8 % (ref 5–12)
NEUTROPHILS # BLD AUTO: 3.88 10*3/MM3 (ref 1.7–7)
NEUTROPHILS NFR BLD AUTO: 57.5 % (ref 42.7–76)
NITRITE UR-MCNC: NEGATIVE MG/ML
PH UR: 5 [PH] (ref 5–8)
PLATELET # BLD AUTO: 148 10*3/MM3 (ref 140–450)
PMV BLD AUTO: 14.3 FL (ref 6–12)
POTASSIUM BLD-SCNC: 3.9 MMOL/L (ref 3.5–5.2)
PROT SERPL-MCNC: 6.5 G/DL (ref 6–8.5)
PROT UR STRIP-MCNC: NEGATIVE MG/DL
RBC # BLD AUTO: 4.78 10*6/MM3 (ref 3.77–5.28)
RBC # UR STRIP: NEGATIVE /UL
SODIUM BLD-SCNC: 141 MMOL/L (ref 136–145)
SP GR UR: 1.02 (ref 1–1.03)
TSH SERPL DL<=0.05 MIU/L-ACNC: 6.17 UIU/ML (ref 0.27–4.2)
UROBILINOGEN UR QL: NORMAL
WBC NRBC COR # BLD: 6.75 10*3/MM3 (ref 3.4–10.8)

## 2020-05-21 PROCEDURE — 84443 ASSAY THYROID STIM HORMONE: CPT | Performed by: NURSE PRACTITIONER

## 2020-05-21 PROCEDURE — 73562 X-RAY EXAM OF KNEE 3: CPT

## 2020-05-21 PROCEDURE — 99396 PREV VISIT EST AGE 40-64: CPT | Performed by: NURSE PRACTITIONER

## 2020-05-21 PROCEDURE — 85025 COMPLETE CBC W/AUTO DIFF WBC: CPT | Performed by: NURSE PRACTITIONER

## 2020-05-21 PROCEDURE — 99213 OFFICE O/P EST LOW 20 MIN: CPT | Performed by: NURSE PRACTITIONER

## 2020-05-21 PROCEDURE — 80053 COMPREHEN METABOLIC PANEL: CPT | Performed by: NURSE PRACTITIONER

## 2020-05-21 RX ORDER — METHYLPREDNISOLONE 4 MG/1
TABLET ORAL
Qty: 1 EACH | Refills: 0 | Status: SHIPPED | OUTPATIENT
Start: 2020-05-21 | End: 2020-08-25

## 2020-05-21 NOTE — PATIENT INSTRUCTIONS
"DASH Eating Plan  DASH stands for \"Dietary Approaches to Stop Hypertension.\" The DASH eating plan is a healthy eating plan that has been shown to reduce high blood pressure (hypertension). It may also reduce your risk for type 2 diabetes, heart disease, and stroke. The DASH eating plan may also help with weight loss.  What are tips for following this plan?    General guidelines  · Avoid eating more than 2,300 mg (milligrams) of salt (sodium) a day. If you have hypertension, you may need to reduce your sodium intake to 1,500 mg a day.  · Limit alcohol intake to no more than 1 drink a day for nonpregnant women and 2 drinks a day for men. One drink equals 12 oz of beer, 5 oz of wine, or 1½ oz of hard liquor.  · Work with your health care provider to maintain a healthy body weight or to lose weight. Ask what an ideal weight is for you.  · Get at least 30 minutes of exercise that causes your heart to beat faster (aerobic exercise) most days of the week. Activities may include walking, swimming, or biking.  · Work with your health care provider or diet and nutrition specialist (dietitian) to adjust your eating plan to your individual calorie needs.  Reading food labels    · Check food labels for the amount of sodium per serving. Choose foods with less than 5 percent of the Daily Value of sodium. Generally, foods with less than 300 mg of sodium per serving fit into this eating plan.  · To find whole grains, look for the word \"whole\" as the first word in the ingredient list.  Shopping  · Buy products labeled as \"low-sodium\" or \"no salt added.\"  · Buy fresh foods. Avoid canned foods and premade or frozen meals.  Cooking  · Avoid adding salt when cooking. Use salt-free seasonings or herbs instead of table salt or sea salt. Check with your health care provider or pharmacist before using salt substitutes.  · Do not alexander foods. Cook foods using healthy methods such as baking, boiling, grilling, and broiling instead.  · Cook with " heart-healthy oils, such as olive, canola, soybean, or sunflower oil.  Meal planning  · Eat a balanced diet that includes:  ? 5 or more servings of fruits and vegetables each day. At each meal, try to fill half of your plate with fruits and vegetables.  ? Up to 6-8 servings of whole grains each day.  ? Less than 6 oz of lean meat, poultry, or fish each day. A 3-oz serving of meat is about the same size as a deck of cards. One egg equals 1 oz.  ? 2 servings of low-fat dairy each day.  ? A serving of nuts, seeds, or beans 5 times each week.  ? Heart-healthy fats. Healthy fats called Omega-3 fatty acids are found in foods such as flaxseeds and coldwater fish, like sardines, salmon, and mackerel.  · Limit how much you eat of the following:  ? Canned or prepackaged foods.  ? Food that is high in trans fat, such as fried foods.  ? Food that is high in saturated fat, such as fatty meat.  ? Sweets, desserts, sugary drinks, and other foods with added sugar.  ? Full-fat dairy products.  · Do not salt foods before eating.  · Try to eat at least 2 vegetarian meals each week.  · Eat more home-cooked food and less restaurant, buffet, and fast food.  · When eating at a restaurant, ask that your food be prepared with less salt or no salt, if possible.  What foods are recommended?  The items listed may not be a complete list. Talk with your dietitian about what dietary choices are best for you.  Grains  Whole-grain or whole-wheat bread. Whole-grain or whole-wheat pasta. Brown rice. Oatmeal. Quinoa. Bulgur. Whole-grain and low-sodium cereals. Mercedes bread. Low-fat, low-sodium crackers. Whole-wheat flour tortillas.  Vegetables  Fresh or frozen vegetables (raw, steamed, roasted, or grilled). Low-sodium or reduced-sodium tomato and vegetable juice. Low-sodium or reduced-sodium tomato sauce and tomato paste. Low-sodium or reduced-sodium canned vegetables.  Fruits  All fresh, dried, or frozen fruit. Canned fruit in natural juice (without  added sugar).  Meat and other protein foods  Skinless chicken or turkey. Ground chicken or turkey. Pork with fat trimmed off. Fish and seafood. Egg whites. Dried beans, peas, or lentils. Unsalted nuts, nut butters, and seeds. Unsalted canned beans. Lean cuts of beef with fat trimmed off. Low-sodium, lean deli meat.  Dairy  Low-fat (1%) or fat-free (skim) milk. Fat-free, low-fat, or reduced-fat cheeses. Nonfat, low-sodium ricotta or cottage cheese. Low-fat or nonfat yogurt. Low-fat, low-sodium cheese.  Fats and oils  Soft margarine without trans fats. Vegetable oil. Low-fat, reduced-fat, or light mayonnaise and salad dressings (reduced-sodium). Canola, safflower, olive, soybean, and sunflower oils. Avocado.  Seasoning and other foods  Herbs. Spices. Seasoning mixes without salt. Unsalted popcorn and pretzels. Fat-free sweets.  What foods are not recommended?  The items listed may not be a complete list. Talk with your dietitian about what dietary choices are best for you.  Grains  Baked goods made with fat, such as croissants, muffins, or some breads. Dry pasta or rice meal packs.  Vegetables  Creamed or fried vegetables. Vegetables in a cheese sauce. Regular canned vegetables (not low-sodium or reduced-sodium). Regular canned tomato sauce and paste (not low-sodium or reduced-sodium). Regular tomato and vegetable juice (not low-sodium or reduced-sodium). Pickles. Olives.  Fruits  Canned fruit in a light or heavy syrup. Fried fruit. Fruit in cream or butter sauce.  Meat and other protein foods  Fatty cuts of meat. Ribs. Fried meat. Lorenzo. Sausage. Bologna and other processed lunch meats. Salami. Fatback. Hotdogs. Bratwurst. Salted nuts and seeds. Canned beans with added salt. Canned or smoked fish. Whole eggs or egg yolks. Chicken or turkey with skin.  Dairy  Whole or 2% milk, cream, and half-and-half. Whole or full-fat cream cheese. Whole-fat or sweetened yogurt. Full-fat cheese. Nondairy creamers. Whipped toppings.  Processed cheese and cheese spreads.  Fats and oils  Butter. Stick margarine. Lard. Shortening. Ghee. Lorenzo fat. Tropical oils, such as coconut, palm kernel, or palm oil.  Seasoning and other foods  Salted popcorn and pretzels. Onion salt, garlic salt, seasoned salt, table salt, and sea salt. Worcestershire sauce. Tartar sauce. Barbecue sauce. Teriyaki sauce. Soy sauce, including reduced-sodium. Steak sauce. Canned and packaged gravies. Fish sauce. Oyster sauce. Cocktail sauce. Horseradish that you find on the shelf. Ketchup. Mustard. Meat flavorings and tenderizers. Bouillon cubes. Hot sauce and Tabasco sauce. Premade or packaged marinades. Premade or packaged taco seasonings. Relishes. Regular salad dressings.  Where to find more information:  · National Heart, Lung, and Blood [place]: www.nhlbi.nih.gov  · American Heart Association: www.heart.org  Summary  · The DASH eating plan is a healthy eating plan that has been shown to reduce high blood pressure (hypertension). It may also reduce your risk for type 2 diabetes, heart disease, and stroke.  · With the DASH eating plan, you should limit salt (sodium) intake to 2,300 mg a day. If you have hypertension, you may need to reduce your sodium intake to 1,500 mg a day.  · When on the DASH eating plan, aim to eat more fresh fruits and vegetables, whole grains, lean proteins, low-fat dairy, and heart-healthy fats.  · Work with your health care provider or diet and nutrition specialist (dietitian) to adjust your eating plan to your individual calorie needs.  This information is not intended to replace advice given to you by your health care provider. Make sure you discuss any questions you have with your health care provider.  Document Released: 12/06/2012 Document Revised: 11/30/2018 Document Reviewed: 12/11/2017  Elsevier Patient Education © 2020 [employer]

## 2020-05-21 NOTE — PROGRESS NOTES
Chief Complaint   Patient presents with   • Annual Exam     w/o pap, non-fasting   • Knee Pain     x2 months, constant pain        Subjective     History of Present Illness   Patient is here today for annual exam.      Patient has hypertension blood pressure stable today.  She is up 2 pounds.  She would like to do counseling for weight.  She is been watching her concentrated sugars and adding more veggies to her diet.  She does have difficulty with activity due to knee pain.     Regards to knee pain she rates his pain a 10 on a scale of 1-10.  Patient has pain of the right knee x2 months.  Nothing seems to make a symptoms better movement makes the symptoms worse although she has not tried anything specific to treat the symptoms.  There is been no trauma prior.  The pain is on the inside of the right knee.  Resting the knee does not improve symptoms.    She is also have problems with her hemorrhoids.  There is been no pain but bleeding.  She does have some constipation.  She has not used over-the-counter treatment such as Preparation H or Tucks.  She has stopped using her MiraLAX.  She does feel constipated.  She reports the MiraLAX typically does not help.  Last colonoscopy 2015 she thinks.1/16 8mm peducultated sigmonid poyp-adenomatous.  Patient is taking her Linzess.    She also has complaints of a mild swelling in her lower extremities.    Patient has hyperlipidemia.  Take simvastatin at bedtime without abdominal pain or myalgia.  She notes limiting Tylenol alcohol and avoid grapefruit-containing products.    Patient has COPD.  She is not using her albuterol.  She is not using her Spiriva.  She denies exacerbation.  She has had history of tobacco cessation for 17 years since cessation in 2014.    Patient has GERD without exacerbation.      Copd no use of alb-not takin gany no t spriva    Thrombocytopenia- 8/19 Lee thrombocytopenia -recheck next visit.    Patient has anxiety and depression.  Has been up the  pharmacy she is not taking her Wellbutrin.  Patient states her depression is exacerbated and she called to get a medication and can obtain it.  She denies SI or HI.     She has chronic low back pain she takes Mobic which helps to control her symptoms.    On gabapentin for pain.     Palpable area on abdomen at previous visit not present on this exam.  There is to be no further imaging if area had not changed.  Patient does not remember having area even noted on her abdomen.  Denies there is an area on her abdomen at present.    Last Pap 7/17 due next visit.    2018 palpable area on abd nonsign unless change    Mamm 8/19 was to have further follow-up 3/20 has not completed        The following portions of the patient's history were reviewed and updated as appropriate: allergies, current medications, past family history, past medical history, past social history, past surgical history and problem list.      Current Outpatient Medications:   •  albuterol sulfate  (90 Base) MCG/ACT inhaler, Inhale 2 puffs Every 4 (Four) Hours As Needed for Wheezing., Disp: 1 inhaler, Rfl: 0  •  B Complex Vitamins (VITAMIN B COMPLEX PO), Take 1 tablet by mouth Daily As Needed., Disp: , Rfl:   •  buPROPion XL (WELLBUTRIN XL) 300 MG 24 hr tablet, TAKE 1 TABLET BY MOUTH DAILY, Disp: 30 tablet, Rfl: 5  •  cetirizine (zyrTEC) 10 MG tablet, TAKE 1 TABLET BY MOUTH ONCE DAILY, Disp: 30 tablet, Rfl: 4  •  fluticasone (FLONASE) 50 MCG/ACT nasal spray, 1 spray by Each Nare route Daily., Disp: 16 g, Rfl: 11  •  gabapentin (NEURONTIN) 400 MG capsule, Take 1 capsule by mouth 3 (Three) Times a Day., Disp: 90 capsule, Rfl: 2  •  hydrOXYzine (ATARAX) 25 MG tablet, take 1 tablet by mouth at bedtime if needed, Disp: 30 tablet, Rfl: 1  •  ketoconazole (NIZORAL) 2 % shampoo, Apply  topically to the appropriate area as directed 2 (Two) Times a Week. Apply to affected area, Disp: 120 mL, Rfl: 1  •  lansoprazole (PREVACID) 30 MG capsule, TAKE 1 CAPSULE BY  MOUTH DAILY, Disp: 30 capsule, Rfl: 0  •  latanoprost (XALATAN) 0.005 % ophthalmic solution, Administer 1 drop to both eyes every night., Disp: , Rfl:   •  levothyroxine (SYNTHROID, LEVOTHROID) 50 MCG tablet, Take 1 tablet by mouth Daily., Disp: 30 tablet, Rfl: 1  •  linaclotide (Linzess) 72 MCG capsule capsule, Take 1 capsule by mouth Every Morning., Disp: 30 capsule, Rfl: 0  •  lisinopril (PRINIVIL,ZESTRIL) 30 MG tablet, TAKE 1 TABLET BY MOUTH EVERY NIGHT AT BEDTIME, Disp: 30 tablet, Rfl: 0  •  meloxicam (MOBIC) 7.5 MG tablet, Take 1 tablet by mouth Daily., Disp: 30 tablet, Rfl: 5  •  Multiple Vitamins-Minerals (HAIR/SKIN/NAILS/BIOTIN) tablet, Take 1 tablet by mouth Daily As Needed., Disp: , Rfl:   •  polyethylene glycol (MIRALAX) packet, Take 17 g by mouth Daily., Disp: 72 packet, Rfl: 2  •  RA FIBER THERAPY 625 MG tablet, , Disp: , Rfl: 1  •  sertraline (ZOLOFT) 100 MG tablet, TAKE 1 TABLET BY MOUTH DAILY, Disp: 30 tablet, Rfl: 0  •  simvastatin (ZOCOR) 20 MG tablet, Take 1 tablet by mouth every night at bedtime., Disp: 30 tablet, Rfl: 3  •  tiotropium (SPIRIVA HANDIHALER) 18 MCG per inhalation capsule, INHALE CONTENTS OF 1 CAPSULE BY MOUTH ONCE DAILY, Disp: 30 capsule, Rfl: 0  •  traZODone (DESYREL) 50 MG tablet, TAKE 1 TABLET BY MOUTH EVERY NIGHT, Disp: 30 tablet, Rfl: 0  •  vitamin B-12 (CYANOCOBALAMIN) 1000 MCG tablet, Take 5,000 mcg by mouth Daily As Needed., Disp: , Rfl:   •  benzoyl peroxide 5 % gel, Apply 1 application topically to the appropriate area as directed 2 (Two) Times a Day. APPLY TO AFFECTED AREA, Disp: , Rfl:   •  clindamycin-benzoyl peroxide (BENZACLIN) 1-5 % gel, Apply  topically to the appropriate area as directed 2 (Two) Times a Day., Disp: 50 g, Rfl: 3  •  levothyroxine (SYNTHROID, LEVOTHROID) 25 MCG tablet, TAKE 1 TABLET BY MOUTH DAILY, Disp: 30 tablet, Rfl: 0  •  methylPREDNISolone (MEDROL, DRAKE,) 4 MG tablet, Take as directed on package instructions., Disp: 1 each, Rfl: 0  •   mometasone-formoterol (DULERA) 100-5 MCG/ACT inhaler, , Disp: , Rfl:     Vitals:    05/21/20 1038   BP: 110/70   Pulse: 77   Resp: 16   Temp: 97.3 °F (36.3 °C)   SpO2: 97%       Body mass index is 42.14 kg/m².        Review of Systems   Constitutional: Negative for chills, fatigue, fever, unexpected weight gain and unexpected weight loss.        No night sweats.  No generalized pain.   HENT: Negative for congestion, ear pain, hearing loss, nosebleeds, postnasal drip, rhinorrhea, sinus pressure, sneezing, sore throat, tinnitus and voice change.         Denies snoring.   Eyes: Negative for photophobia, pain, discharge, redness, itching and visual disturbance.   Respiratory: Negative for cough, chest tightness, shortness of breath, wheezing and stridor.         No orthopnea, dyspnea on exertion, or PND.  No chest congestion.   No  hemoptysis.   Cardiovascular: Negative for chest pain, palpitations and leg swelling.        No claudication or syncope.   Gastrointestinal: Positive for blood in stool and constipation. Negative for abdominal pain, diarrhea, nausea, rectal pain, vomiting, GERD and indigestion.        No hematemesis.  No heartburn, dysphagia or odynophagia.  No belching or bloating.  No melena.   Endocrine: Negative for cold intolerance, heat intolerance, polydipsia, polyphagia and polyuria.        No hair loss or dry skin.  No generalized weakness.  No hot flashes.   Genitourinary: Negative for breast discharge, breast lump, difficulty urinating, dyspareunia, dysuria, flank pain, frequency, hematuria, menstrual problem, pelvic pain, urgency, urinary incontinence, vaginal bleeding and vaginal discharge.        No nocturia or incomplete emptying.   Musculoskeletal: Negative for arthralgias, back pain, gait problem, joint swelling, myalgias, neck pain and neck stiffness.        No joint stiffness.  Knee pain-right   Skin: Negative for rash.   Allergic/Immunologic: Negative for environmental allergies, food  allergies and immunocompromised state.   Neurological: Negative for dizziness, tremors, syncope, speech difficulty, weakness, light-headedness, numbness, memory problem and confusion.        No tingling.   Hematological: Negative for adenopathy. Does not bruise/bleed easily.   Psychiatric/Behavioral: Negative for decreased concentration, sleep disturbance, suicidal ideas and depressed mood. The patient is not nervous/anxious.         No confusion.       Objective   Physical Exam   Constitutional: She is oriented to person, place, and time. She appears well-developed and well-nourished.   HENT:   Head: Normocephalic and atraumatic.   Right Ear: External ear normal.   Left Ear: External ear normal.   Nose: Nose normal.   Mouth/Throat: Oropharynx is clear and moist.   Eyes: Pupils are equal, round, and reactive to light. Conjunctivae are normal. Right eye exhibits no discharge. Left eye exhibits no discharge. No scleral icterus.   Neck: Normal range of motion. Carotid bruit is not present. No thyromegaly present.   Cardiovascular: Normal rate, regular rhythm, normal heart sounds and intact distal pulses. Exam reveals no gallop and no friction rub.   No murmur heard.  Pulmonary/Chest: Effort normal and breath sounds normal. Right breast exhibits no inverted nipple, no mass, no nipple discharge, no skin change and no tenderness. Left breast exhibits no inverted nipple, no mass, no nipple discharge, no skin change and no tenderness.   Abdominal: Soft. Bowel sounds are normal. She exhibits no distension and no mass. There is no tenderness. There is no rebound and no guarding. No hernia.   Musculoskeletal: Normal range of motion. She exhibits no edema.   Negative valgus varus negative anterior posterior drawer sign there is no swelling of the right knee.  There is no warmth or tenderness bruising.  Patient is free range of motion is able to bear weight on the knee.   Lymphadenopathy:        Head (right side): No submental,  no submandibular, no tonsillar, no preauricular, no posterior auricular and no occipital adenopathy present.        Head (left side): No submental, no submandibular, no tonsillar, no preauricular, no posterior auricular and no occipital adenopathy present.     She has no cervical adenopathy.     She has no axillary adenopathy.        Right: No inguinal, no supraclavicular and no epitrochlear adenopathy present.        Left: No inguinal, no supraclavicular and no epitrochlear adenopathy present.   Neurological: She is alert and oriented to person, place, and time. She has normal strength. She displays normal reflexes.   Skin: Skin is warm and dry. Capillary refill takes 2 to 3 seconds.   Psychiatric: She has a normal mood and affect. Her behavior is normal. Judgment and thought content normal. Cognition and memory are normal.   Nursing note and vitals reviewed.      No Pap today        Assessment/Plan   Adrianne was seen today for annual exam and knee pain.    Diagnoses and all orders for this visit:    Annual physical exam  -     Comprehensive Metabolic Panel  -     POC Urinalysis Dipstick, Automated    Essential hypertension  -     Comprehensive Metabolic Panel  -     POC Urinalysis Dipstick, Automated    Hyperlipidemia, unspecified hyperlipidemia type  -     Comprehensive Metabolic Panel  -     Lipid Panel    Internal hemorrhoid    Chronic obstructive pulmonary disease, unspecified COPD type (CMS/[facility])    Gastroesophageal reflux disease without esophagitis  -     CBC & Differential    Other constipation    Class 3 severe obesity due to excess calories with serious comorbidity and body mass index (BMI) of 40.0 to 44.9 in adult (CMS/[facility])    Thrombocytopenia (CMS/[facility])  -     CBC & Differential    Anxiety with depression    Compliance poor    Constipation, unspecified constipation type  -     linaclotide (Linzess) 72 MCG capsule capsule; Take 1 capsule by mouth Every Morning.    Acquired hypothyroidism    Acute pain of  right knee  -     XR Knee 3 View Right; Future  -     Ambulatory Referral to Physical Therapy Evaluate and treat    Other orders  -     methylPREDNISolone (MEDROL, DRAKE,) 4 MG tablet; Take as directed on package instructions.    New problems today rectal bleeding, right knee pain, needs follow-up on existing abnormal mammogram.  Order  US and mamm of R breast found 3/2020 not done  Patient wants mammogram which is due to be done at [facility].  Ultrasound and mammogram of right breast ordered 3/2020 not completed.  Last mammogram noted in chart 8/19.  Of asked patient to set these up.  I will call to see if she is completed in the next week.  Pap next visit.  Colonoscopy ordered due to need for follow-up of adenomatous polyp and new rectal bleeding.  1/16 8mm peducultated sigmonid poyp-adenomatous   HB 1- 7/19 for alexander and csa due 7/2020  Patient education regarding the importance of avoidance of texting while driving and the need for wearing seatbelt.  Use of sunscreen, use of helmets while on bikes.  The appropriate amounts of sleep and H20 consumption per day was reviewed with pt.  The need for healthy well-balanced diet based off the food guide pyramid and avoidance of skipping meals along with following a NCS diet with appropriate amounts of fats, protein, and carbohydrate and low sodium diet. Encouraging 30minutes of cardio 5d weekly and muscle strengthening 3x weekly.     Pap next visit-due patient declined today  Discussed the patient's BMI with her. BMI is above normal parameters. Recommendations include: educational material.  Cari --needs refill 3 mo in office alexander today  csa today  Return in about 3 months (around 8/21/2020) for fasting, Next scheduled follow up.  RTC/call  If symptoms worsen  Meds MOA and SE's reviewed and pt v/u

## 2020-05-22 ENCOUNTER — TELEPHONE (OUTPATIENT)
Dept: INTERNAL MEDICINE | Facility: CLINIC | Age: 55
End: 2020-05-22

## 2020-05-22 DIAGNOSIS — E03.9 ACQUIRED HYPOTHYROIDISM: Primary | ICD-10-CM

## 2020-05-22 NOTE — TELEPHONE ENCOUNTER
----- Message from ROMI Penny sent at 5/21/2020  9:02 PM EDT -----  Please call patient to see if she is taking her Synthroid.  Please see if she is taking his Synthroid an hour apart from other medications and every day.  She is taking her medication as directed please let her know I will need to increase her medicine because she is not in normal range.  If she is not taking her medication every day please asked her to take her medication as directed and I would like for her to have her thyroid rechecked in 1 month.  Please let me know thank you

## 2020-05-22 NOTE — TELEPHONE ENCOUNTER
Notes recorded by Paula Fenton CMA on 5/22/2020 at 8:18 AM EDT  Tried to call patient no answer LVM to CB

## 2020-05-22 NOTE — TELEPHONE ENCOUNTER
Patient states she has not been taking the medication but will start. Patient notified to come back in 1 month for recheck after she starts taking medication.

## 2020-05-28 ENCOUNTER — TELEPHONE (OUTPATIENT)
Dept: INTERNAL MEDICINE | Facility: CLINIC | Age: 55
End: 2020-05-28

## 2020-05-28 PROBLEM — Z87.891 SMOKING HISTORY: Status: ACTIVE | Noted: 2020-05-28

## 2020-06-03 ENCOUNTER — TREATMENT (OUTPATIENT)
Dept: PHYSICAL THERAPY | Facility: CLINIC | Age: 55
End: 2020-06-03

## 2020-06-03 DIAGNOSIS — M25.561 CHRONIC PAIN OF RIGHT KNEE: Primary | ICD-10-CM

## 2020-06-03 DIAGNOSIS — G89.29 CHRONIC PAIN OF RIGHT KNEE: Primary | ICD-10-CM

## 2020-06-03 PROCEDURE — 97162 PT EVAL MOD COMPLEX 30 MIN: CPT | Performed by: PHYSICAL THERAPIST

## 2020-06-03 NOTE — PROGRESS NOTES
Physical Therapy Initial Evaluation and Plan of Care      Patient: Adrianne Lowe   : 1965  Diagnosis/ICD-10 Code:  The encounter diagnosis was Chronic pain of right knee.   Referring practitioner: ROMI Penny  Date of Initial Visit: Type: THERAPY  Noted: 6/3/2020  Today's Date: 6/3/2020  Patient seen for 1 sessions         Adrianne Lowe reports:  R knee pain with decreased tolerance for activity.    Subjective Questionnaire: LEFS     Subjective Evaluation    History of Present Illness  Onset date: 2020.  Mechanism of injury: Pt recalls no injury.  She says she had been cleaning her floors on her hands and knees, and afterward noted pain in her right knee.    Subjective comment: R medial knee pain for the past 2-3 months  Patient Occupation: Pt does not work outide the home. Pain  Current pain ratin  At best pain rating: 3  At worst pain rating: 10  Location: R medial knee  Relieving factors: rest and medications  Aggravating factors: standing and ambulation  Progression: no change    Social Support  Lives in: one-story house  Lives with: alone    Hand dominance: right    Diagnostic Tests  X-ray: abnormal (mild narrowing of medial joint space R knee)    Patient Goals  Patient goals for therapy: decreased pain           Treatment                Objective          Palpation     Additional Palpation Details  Generalized tenderness at medial aspect of R knee above and below joint line.    Neurological Testing     Sensation     Knee   Left Knee   Intact: light touch    Right Knee   Intact: light touch     Active Range of Motion   Left Knee   Normal active range of motion    Right Knee   Normal active range of motion  Flexion: 123 degrees   Extension: 0 degrees     Patellar Static Positioning   Left Knee: lateral tilt  Right Knee: lateral tilt    Strength/Myotome Testing     Left Hip   Planes of Motion   Flexion: 4  Extension: 3  Abduction: 3-    Right Hip   Planes of  Motion   Flexion: 3+  Extension: 3  Abduction: 3-    Left Knee   Normal strength    Right Knee   Flexion: 4  Extension: 4    Tests     Right Knee   Negative anterior drawer, posterior drawer, Thessaly's test at 5 degrees, valgus stress test at 0 degrees, valgus stress test at 30 degrees, varus stress test at 0 degrees and varus stress test at 30 degrees.     Ambulation     Comments   Antalgic gait with no assistive device.  Pt avoids flexion of R knee during swing phase of gait.  She has bilateral Trendelenberg lateral trunk lean, more pronounced over R side.      Had pt try using straight cane in clinic today.  This decreased her knee pain, and with cueing, improved her gait quality.  She will consider purchasing a cane to reduce knee strain.     General Comments     Knee Comments  No visible swelling or significant difference in appearance of right knee compared to left knee.         Assessment & Plan     Assessment  Impairments: abnormal gait, activity intolerance, impaired balance, impaired physical strength, lacks appropriate home exercise program and pain with function  Assessment details: Pt presents with decreased hip strength and R knee pain affecting gait quality and gait tolerance.  She will benefit from PT intervention to address pain and noted deficits.  Prognosis: good  Functional Limitations: carrying objects, lifting, walking and standing  Goals  Plan Goals: Pt. demonstrates independence and compliance with initial HEP.  Pt. reports reduction in pain intensity to no worse than 5/10 on NPRS.  B hip strength is improved by 1/2 grade in abduction and extension.  Pt demonstrates safe ambulation with straight cane.  Functional outcome measure is improved by 15 points.    Pt. demonstrates independence in advanced HEP for ongoing improvement.  LE strength is improved to WFL's  to allow participation in desired activities.  Functional outcome measure is improved to >/=40/80 points.          Plan  Therapy  options: will be seen for skilled physical therapy services  Planned modality interventions: cryotherapy and thermotherapy (hydrocollator packs)  Planned therapy interventions: manual therapy, neuromuscular re-education, strengthening, stretching, therapeutic activities, home exercise program, gait training, functional ROM exercises, flexibility and balance/weight-bearing training  Frequency: 2x week  Duration in visits: 12  Treatment plan discussed with: patient  Plan details: PT per POC, addressing pain, strength deficits, gait limitations, and functional abilities.        Timed:  Manual Therapy:         mins  41299;  Therapeutic Exercise:         mins  61809;     Neuromuscular Diamond:        mins  70924;    Therapeutic Activity:          mins  42957;     Gait Training:           mins  19711;     Ultrasound:          mins  12586;    Electrical Stimulation:         mins  14044 ( );    Untimed:  Electrical Stimulation:         mins  40737 ( );  Mechanical Traction:         mins  09747;     Timed Treatment:      mins   Total Treatment:     45   mins    PT SIGNATURE: Ruthie Howard, PT   DATE TREATMENT INITIATED: 6/3/2020    Initial Certification  Certification Period: 9/1/2020  I certify that the therapy services are furnished while this patient is under my care.  The services outlined above are required by this patient, and will be reviewed every 90 days.     PHYSICIAN: Jamee Leo, APRN      DATE:     Please sign and return via fax to  .. Thank you, [facility].

## 2020-06-08 ENCOUNTER — TREATMENT (OUTPATIENT)
Dept: PHYSICAL THERAPY | Facility: CLINIC | Age: 55
End: 2020-06-08

## 2020-06-08 DIAGNOSIS — M25.561 CHRONIC PAIN OF RIGHT KNEE: Primary | ICD-10-CM

## 2020-06-08 DIAGNOSIS — G89.29 CHRONIC PAIN OF RIGHT KNEE: Primary | ICD-10-CM

## 2020-06-08 PROCEDURE — 97110 THERAPEUTIC EXERCISES: CPT | Performed by: PHYSICAL THERAPIST

## 2020-06-08 PROCEDURE — 97116 GAIT TRAINING THERAPY: CPT | Performed by: PHYSICAL THERAPIST

## 2020-06-08 NOTE — PROGRESS NOTES
"   Physical Therapy Daily Progress Note  VISIT: 2      Adrianne Lowe reports: no knee pain at rest, 8/10 pain when walking on it.  She looked at canes during the weekend, but decided not to get one.  Near end of session, pt asked about back and leg pain while sitting on her couch.    Subjective     Treatment  Pre-treatment pain: 0   Post-treatment pain:  0  Exercise 1  Exercise Name 1: Bicycle  Equipment/Resistance 1: L1  Sets/Reps 1: 5'  Exercise 2  Exercise Name 2: seated hamstring stretch  Exercise 3  Exercise Name 3: seated LAQ  Exercise 4  Exercise Name 4: glider flex/ext  Exercise 5  Exercise Name 5: 4' step up-forward and side  Exercise 6  Exercise Name 6: 4\" step lunge  Exercise 7  Exercise Name 7: 4\" straddle step  Exercise 8  Exercise Name 8: march step  Exercise 9  Exercise Name 9: sidestep  Exercise 10  Exercise Name 10: heel raise  Exercise 11  Exercise Name 11: bridging  Exercise 12  Exercise Name 12: R 1/2 bridging  Exercise 13  Exercise Name 13: seated hip adduction pillow squeeze  Exercise 14  Exercise Name 14: postural ed for sitting  Exercise 15  Exercise Name 15: gait training with cane in left hand-cues for equal step length, smooth jana             Objective     Assessment & Plan     Assessment  Assessment details: Pt indicates reduction in knee pain.  She will benefit from ongoing PT to address remaining pain and strength deficits.    Plan  Plan details: Continue PT.  Address new report of R LE and LBP when sitting.               Timed:  Manual Therapy:         mins  92188;  Therapeutic Exercise:    30     mins  33152;     Neuromuscular Diamond:        mins  74104;    Therapeutic Activity:          mins  82669;     Gait Training:      10     mins  05894;     Ultrasound:          mins  17529;    Electrical Stimulation:         mins  11982 ( );    Untimed:  Electrical Stimulation:         mins  13055 ( );  Mechanical Traction:         mins  69810;     Timed Treatment: 40 "     mins   Total Treatment:     40   mins      Ruthie Howard, PT  Physical Therapist

## 2020-06-15 ENCOUNTER — TREATMENT (OUTPATIENT)
Dept: PHYSICAL THERAPY | Facility: CLINIC | Age: 55
End: 2020-06-15

## 2020-06-15 DIAGNOSIS — M25.561 CHRONIC PAIN OF RIGHT KNEE: Primary | ICD-10-CM

## 2020-06-15 DIAGNOSIS — G89.29 CHRONIC PAIN OF RIGHT KNEE: Primary | ICD-10-CM

## 2020-06-15 PROCEDURE — 97110 THERAPEUTIC EXERCISES: CPT | Performed by: PHYSICAL THERAPIST

## 2020-06-15 NOTE — PROGRESS NOTES
"   Physical Therapy Daily Progress Note  VISIT: 3      Adrianne Lowe reports: her knee is just a little sore.  She can tell it is getting better.  She notices that she still leans to the side when she walks.    Subjective     Treatment  Pre-treatment pain:  0 (no pain at rest, 3-4/10 with walking)  Post-treatment pain:  0  Exercise 1  Exercise Name 1: Bicycle  Equipment/Resistance 1: L1  Sets/Reps 1: 5'  Exercise 2  Exercise Name 2: 6\" step lunge  Exercise 3  Exercise Name 3: 6\" straddle step  Exercise 4  Exercise Name 4: supine heel slide with strap assist  Exercise 5  Exercise Name 5: hooklying hip adduction squeeze  Exercise 6  Exercise Name 6: seated B LAQ with hip add squeeze  Exercise 7  Exercise Name 7: standing hip abduction  Exercise 8  Exercise Name 8: standing hip extension             Objective     Assessment & Plan     Assessment  Assessment details: Pt demonstrates significant improvement in R knee pain, even while walking.  She continues to exhibit gait alteration related more to hip weakness than to knee pain.  She is aware of the compensation and will practice single leg stance at home while avoiding trunk lean.    Plan  Plan details: Continue PT.  Progress functional LE strengthening related to gait quality and reducing knee strain.               Timed:  Manual Therapy:         mins  16427;  Therapeutic Exercise:    30     mins  52631;     Neuromuscular Diamond:        mins  45031;    Therapeutic Activity:          mins  20179;     Gait Training:           mins  04238;     Ultrasound:          mins  04802;    Electrical Stimulation:         mins  84737 ( );    Untimed:  Electrical Stimulation:         mins  74197 ( );  Mechanical Traction:         mins  86470;     Timed Treatment:   30   mins   Total Treatment:     30   mins      Ruthie Howard, PT  Physical Therapist                    "

## 2020-06-22 ENCOUNTER — DOCUMENTATION (OUTPATIENT)
Dept: PHYSICAL THERAPY | Facility: CLINIC | Age: 55
End: 2020-06-22

## 2020-06-22 NOTE — PROGRESS NOTES
Discharge Summary  Discharge Summary from Physical Therapy Report      Patient: Adrianne Lowe   : 1965  Diagnosis/ICD-10 Code:  There were no encounter diagnoses.   Referring practitioner: No ref. provider found  Date of Initial Visit: No linked episodes  Today's Date: 2020  Date of Last Visit: 6-     Number of Visits: 3    Discharge Status of Patient: Knee pain was decreasing when last seen for treatment on 6-.    Goals: Partially Met    Discharge Plan: Continue with current home exercise program as instructed    Comments:  Pt arrived >1 hour early for appointment.  She was upset that she was asked to wait in her car (due to COVID-19 guidelines).  PT followed up by phone and pt elected to cancel all remaining appointments.    Date of Discharge: 2020        Ruthie Howard, PT

## 2020-06-24 DIAGNOSIS — G47.00 INSOMNIA, UNSPECIFIED TYPE: ICD-10-CM

## 2020-06-24 DIAGNOSIS — I10 ESSENTIAL HYPERTENSION: ICD-10-CM

## 2020-06-24 RX ORDER — LISINOPRIL 30 MG/1
30 TABLET ORAL
Qty: 30 TABLET | Refills: 0 | Status: SHIPPED | OUTPATIENT
Start: 2020-06-24 | End: 2020-07-22

## 2020-06-24 RX ORDER — TRAZODONE HYDROCHLORIDE 50 MG/1
50 TABLET ORAL NIGHTLY
Qty: 30 TABLET | Refills: 0 | Status: SHIPPED | OUTPATIENT
Start: 2020-06-24 | End: 2020-07-18

## 2020-06-24 NOTE — TELEPHONE ENCOUNTER
PT CALLED REQUESTING A REFILL FOR:    lisinopril (PRINIVIL,ZESTRIL) 30 MG tablet    AND    traZODone (DESYREL) 50 MG tablet    [facility]/pharmacy #[id number] - [address] AT [facility] - [phone]  - [phone] FX

## 2020-06-25 ENCOUNTER — TELEPHONE (OUTPATIENT)
Dept: INTERNAL MEDICINE | Facility: CLINIC | Age: 55
End: 2020-06-25

## 2020-06-25 NOTE — TELEPHONE ENCOUNTER
PT STATES THAT SHE DID GET HER MEDICATION.    PT STATES THAT PHARMACY WAS OUT BUT HAVE RESTOCKED.      PLEASE ADVISE

## 2020-06-28 PROCEDURE — 87070 CULTURE OTHR SPECIMN AEROBIC: CPT | Performed by: INTERNAL MEDICINE

## 2020-06-28 PROCEDURE — 87205 SMEAR GRAM STAIN: CPT | Performed by: INTERNAL MEDICINE

## 2020-06-30 RX ORDER — KETOCONAZOLE 20 MG/ML
SHAMPOO TOPICAL
Qty: 120 ML | Refills: 0 | Status: SHIPPED | OUTPATIENT
Start: 2020-06-30 | End: 2020-10-20

## 2020-07-01 ENCOUNTER — TELEPHONE (OUTPATIENT)
Dept: URGENT CARE | Facility: CLINIC | Age: 55
End: 2020-07-01

## 2020-07-05 ENCOUNTER — TELEPHONE (OUTPATIENT)
Dept: INTERNAL MEDICINE | Facility: CLINIC | Age: 55
End: 2020-07-05

## 2020-07-05 DIAGNOSIS — G89.29 CHRONIC LOW BACK PAIN WITHOUT SCIATICA, UNSPECIFIED BACK PAIN LATERALITY: ICD-10-CM

## 2020-07-05 DIAGNOSIS — M54.50 CHRONIC LOW BACK PAIN WITHOUT SCIATICA, UNSPECIFIED BACK PAIN LATERALITY: ICD-10-CM

## 2020-07-05 NOTE — TELEPHONE ENCOUNTER
Please call the patient regarding her mammogram and ultrasound which have not been completed.  Please see if she is going to get this done.

## 2020-07-06 ENCOUNTER — TELEPHONE (OUTPATIENT)
Dept: INTERNAL MEDICINE | Facility: CLINIC | Age: 55
End: 2020-07-06

## 2020-07-06 PROBLEM — R92.8 ABNORMAL MAMMOGRAM: Status: ACTIVE | Noted: 2020-07-06

## 2020-07-06 RX ORDER — GABAPENTIN 400 MG/1
CAPSULE ORAL
Qty: 90 CAPSULE | Refills: 0 | Status: SHIPPED | OUTPATIENT
Start: 2020-07-06 | End: 2020-08-04

## 2020-07-06 NOTE — TELEPHONE ENCOUNTER
Spoke with patient she stated that she is ok for now but would call back if she thinks she will need to have them done.

## 2020-07-06 NOTE — TELEPHONE ENCOUNTER
Received a fax from pharmacy stating that insurance will not cover patients medication Clindamycin benzoyl perox 1-5% would like them to be separate. Please advise?

## 2020-07-10 NOTE — TELEPHONE ENCOUNTER
Julia  Please call patient to see when her last mammogram was completed.  It looks as though it may have been done outlying clinic.

## 2020-07-11 NOTE — TELEPHONE ENCOUNTER
[facility] [phone]  [facility] requesting pt call back. Office number given.

## 2020-07-13 NOTE — TELEPHONE ENCOUNTER
Spoke with patient and she stated she had one done last year at [facility]. Sent fax request to [facility] Medical records.  awaiting fax

## 2020-07-18 DIAGNOSIS — G47.00 INSOMNIA, UNSPECIFIED TYPE: ICD-10-CM

## 2020-07-18 RX ORDER — TRAZODONE HYDROCHLORIDE 50 MG/1
TABLET ORAL
Qty: 30 TABLET | Refills: 0 | Status: SHIPPED | OUTPATIENT
Start: 2020-07-18 | End: 2020-08-18

## 2020-07-22 DIAGNOSIS — I10 ESSENTIAL HYPERTENSION: ICD-10-CM

## 2020-07-22 RX ORDER — LISINOPRIL 30 MG/1
TABLET ORAL
Qty: 30 TABLET | Refills: 0 | Status: SHIPPED | OUTPATIENT
Start: 2020-07-22 | End: 2020-08-18

## 2020-07-31 DIAGNOSIS — M54.50 CHRONIC LOW BACK PAIN WITHOUT SCIATICA, UNSPECIFIED BACK PAIN LATERALITY: ICD-10-CM

## 2020-07-31 DIAGNOSIS — G89.29 CHRONIC LOW BACK PAIN WITHOUT SCIATICA, UNSPECIFIED BACK PAIN LATERALITY: ICD-10-CM

## 2020-08-04 ENCOUNTER — TELEPHONE (OUTPATIENT)
Dept: MAMMOGRAPHY | Facility: HOSPITAL | Age: 55
End: 2020-08-04

## 2020-08-04 RX ORDER — GABAPENTIN 400 MG/1
400 CAPSULE ORAL 3 TIMES DAILY
Qty: 90 CAPSULE | Refills: 0 | Status: SHIPPED | OUTPATIENT
Start: 2020-08-04 | End: 2021-03-08 | Stop reason: SDUPTHER

## 2020-08-04 RX ORDER — CLINDAMYCIN PHOSPHATE AND BENZOYL PEROXIDE 10; 50 MG/G; MG/G
1 GEL TOPICAL DAILY
Qty: 60 G | Refills: 11 | Status: SHIPPED | OUTPATIENT
Start: 2020-08-04 | End: 2021-04-26

## 2020-08-04 NOTE — TELEPHONE ENCOUNTER
Called patient R/T scheduling a canceled appointment.  Patient states she has an appointment to follow-up at [place].

## 2020-08-15 ENCOUNTER — TELEPHONE (OUTPATIENT)
Dept: INTERNAL MEDICINE | Facility: CLINIC | Age: 55
End: 2020-08-15

## 2020-08-15 DIAGNOSIS — Z12.31 BREAST CANCER SCREENING BY MAMMOGRAM: Primary | ICD-10-CM

## 2020-08-16 NOTE — TELEPHONE ENCOUNTER
Let patient know that I received her last mammogram completed 8/19.  I placed an order for mammogram so that she may have completed this year.

## 2020-08-18 DIAGNOSIS — I10 ESSENTIAL HYPERTENSION: ICD-10-CM

## 2020-08-18 DIAGNOSIS — G47.00 INSOMNIA, UNSPECIFIED TYPE: ICD-10-CM

## 2020-08-18 RX ORDER — TRAZODONE HYDROCHLORIDE 50 MG/1
TABLET ORAL
Qty: 30 TABLET | Refills: 0 | Status: SHIPPED | OUTPATIENT
Start: 2020-08-18 | End: 2020-09-11

## 2020-08-18 RX ORDER — LISINOPRIL 30 MG/1
TABLET ORAL
Qty: 30 TABLET | Refills: 0 | OUTPATIENT
Start: 2020-08-18

## 2020-08-18 RX ORDER — TRAZODONE HYDROCHLORIDE 50 MG/1
TABLET ORAL
Qty: 30 TABLET | Refills: 0 | OUTPATIENT
Start: 2020-08-18

## 2020-08-18 RX ORDER — LISINOPRIL 30 MG/1
TABLET ORAL
Qty: 30 TABLET | Refills: 0 | Status: SHIPPED | OUTPATIENT
Start: 2020-08-18 | End: 2020-09-11

## 2020-08-25 ENCOUNTER — OFFICE VISIT (OUTPATIENT)
Dept: INTERNAL MEDICINE | Facility: CLINIC | Age: 55
End: 2020-08-25

## 2020-08-25 VITALS
RESPIRATION RATE: 18 BRPM | WEIGHT: 229.2 LBS | HEART RATE: 78 BPM | TEMPERATURE: 98.9 F | BODY MASS INDEX: 42.18 KG/M2 | DIASTOLIC BLOOD PRESSURE: 80 MMHG | OXYGEN SATURATION: 97 % | HEIGHT: 62 IN | SYSTOLIC BLOOD PRESSURE: 126 MMHG

## 2020-08-25 DIAGNOSIS — H40.9 GLAUCOMA OF BOTH EYES, UNSPECIFIED GLAUCOMA TYPE: ICD-10-CM

## 2020-08-25 DIAGNOSIS — F41.8 ANXIETY WITH DEPRESSION: ICD-10-CM

## 2020-08-25 DIAGNOSIS — K59.09 OTHER CONSTIPATION: ICD-10-CM

## 2020-08-25 DIAGNOSIS — G47.00 INSOMNIA, UNSPECIFIED TYPE: ICD-10-CM

## 2020-08-25 DIAGNOSIS — E03.9 ACQUIRED HYPOTHYROIDISM: ICD-10-CM

## 2020-08-25 DIAGNOSIS — L70.9 ACNE, UNSPECIFIED ACNE TYPE: ICD-10-CM

## 2020-08-25 DIAGNOSIS — E78.5 HYPERLIPIDEMIA, UNSPECIFIED HYPERLIPIDEMIA TYPE: ICD-10-CM

## 2020-08-25 DIAGNOSIS — E66.01 CLASS 3 SEVERE OBESITY DUE TO EXCESS CALORIES WITH SERIOUS COMORBIDITY AND BODY MASS INDEX (BMI) OF 40.0 TO 44.9 IN ADULT (HCC): Primary | ICD-10-CM

## 2020-08-25 DIAGNOSIS — R82.998 LEUKOCYTES IN URINE: ICD-10-CM

## 2020-08-25 DIAGNOSIS — Z12.31 BREAST CANCER SCREENING BY MAMMOGRAM: ICD-10-CM

## 2020-08-25 DIAGNOSIS — M54.50 CHRONIC LOW BACK PAIN WITHOUT SCIATICA, UNSPECIFIED BACK PAIN LATERALITY: ICD-10-CM

## 2020-08-25 DIAGNOSIS — I10 ESSENTIAL HYPERTENSION: ICD-10-CM

## 2020-08-25 DIAGNOSIS — K21.9 GASTROESOPHAGEAL REFLUX DISEASE WITHOUT ESOPHAGITIS: ICD-10-CM

## 2020-08-25 DIAGNOSIS — G89.29 CHRONIC LOW BACK PAIN WITHOUT SCIATICA, UNSPECIFIED BACK PAIN LATERALITY: ICD-10-CM

## 2020-08-25 DIAGNOSIS — J30.89 SEASONAL ALLERGIC RHINITIS DUE TO OTHER ALLERGIC TRIGGER: ICD-10-CM

## 2020-08-25 LAB
ALBUMIN SERPL-MCNC: 4.4 G/DL (ref 3.5–5.2)
ALBUMIN/GLOB SERPL: 1.7 G/DL
ALP SERPL-CCNC: 84 U/L (ref 39–117)
ALT SERPL W P-5'-P-CCNC: 28 U/L (ref 1–33)
ANION GAP SERPL CALCULATED.3IONS-SCNC: 8.4 MMOL/L (ref 5–15)
AST SERPL-CCNC: 19 U/L (ref 1–32)
BASOPHILS # BLD AUTO: 0.02 10*3/MM3 (ref 0–0.2)
BASOPHILS NFR BLD AUTO: 0.3 % (ref 0–1.5)
BILIRUB BLD-MCNC: NEGATIVE MG/DL
BILIRUB SERPL-MCNC: 0.2 MG/DL (ref 0–1.2)
BUN SERPL-MCNC: 17 MG/DL (ref 6–20)
BUN/CREAT SERPL: 27.4 (ref 7–25)
CALCIUM SPEC-SCNC: 9.4 MG/DL (ref 8.6–10.5)
CHLORIDE SERPL-SCNC: 103 MMOL/L (ref 98–107)
CHOLEST SERPL-MCNC: 163 MG/DL (ref 0–200)
CLARITY, POC: ABNORMAL
CO2 SERPL-SCNC: 27.6 MMOL/L (ref 22–29)
COLOR UR: YELLOW
CREAT SERPL-MCNC: 0.62 MG/DL (ref 0.57–1)
DEPRECATED RDW RBC AUTO: 42.6 FL (ref 37–54)
EOSINOPHIL # BLD AUTO: 0.14 10*3/MM3 (ref 0–0.4)
EOSINOPHIL NFR BLD AUTO: 2.3 % (ref 0.3–6.2)
ERYTHROCYTE [DISTWIDTH] IN BLOOD BY AUTOMATED COUNT: 13.8 % (ref 12.3–15.4)
EXPIRATION DATE: ABNORMAL
GFR SERPL CREATININE-BSD FRML MDRD: 100 ML/MIN/1.73
GLOBULIN UR ELPH-MCNC: 2.6 GM/DL
GLUCOSE SERPL-MCNC: 108 MG/DL (ref 65–99)
GLUCOSE UR STRIP-MCNC: NEGATIVE MG/DL
HCT VFR BLD AUTO: 40.4 % (ref 34–46.6)
HDLC SERPL-MCNC: 53 MG/DL (ref 40–60)
HGB BLD-MCNC: 13 G/DL (ref 12–15.9)
KETONES UR QL: NEGATIVE
LDLC SERPL CALC-MCNC: 88 MG/DL (ref 0–100)
LDLC/HDLC SERPL: 1.66 {RATIO}
LEUKOCYTE EST, POC: ABNORMAL
LYMPHOCYTES # BLD AUTO: 1.7 10*3/MM3 (ref 0.7–3.1)
LYMPHOCYTES NFR BLD AUTO: 28.5 % (ref 19.6–45.3)
Lab: ABNORMAL
MCH RBC QN AUTO: 26.9 PG (ref 26.6–33)
MCHC RBC AUTO-ENTMCNC: 32.2 G/DL (ref 31.5–35.7)
MCV RBC AUTO: 83.6 FL (ref 79–97)
MONOCYTES # BLD AUTO: 0.41 10*3/MM3 (ref 0.1–0.9)
MONOCYTES NFR BLD AUTO: 6.9 % (ref 5–12)
NEUTROPHILS NFR BLD AUTO: 3.69 10*3/MM3 (ref 1.7–7)
NEUTROPHILS NFR BLD AUTO: 61.8 % (ref 42.7–76)
NITRITE UR-MCNC: NEGATIVE MG/ML
PH UR: 5 [PH] (ref 5–8)
PLATELET # BLD AUTO: 139 10*3/MM3 (ref 140–450)
PMV BLD AUTO: 14.9 FL (ref 6–12)
POTASSIUM SERPL-SCNC: 4.1 MMOL/L (ref 3.5–5.2)
PROT SERPL-MCNC: 7 G/DL (ref 6–8.5)
PROT UR STRIP-MCNC: NEGATIVE MG/DL
RBC # BLD AUTO: 4.83 10*6/MM3 (ref 3.77–5.28)
RBC # UR STRIP: NEGATIVE /UL
SODIUM SERPL-SCNC: 139 MMOL/L (ref 136–145)
SP GR UR: 1.01 (ref 1–1.03)
TRIGL SERPL-MCNC: 109 MG/DL (ref 0–150)
TSH SERPL DL<=0.05 MIU/L-ACNC: 4.75 UIU/ML (ref 0.27–4.2)
UROBILINOGEN UR QL: NORMAL
VLDLC SERPL-MCNC: 21.8 MG/DL (ref 5–40)
WBC # BLD AUTO: 5.97 10*3/MM3 (ref 3.4–10.8)

## 2020-08-25 PROCEDURE — 84443 ASSAY THYROID STIM HORMONE: CPT | Performed by: NURSE PRACTITIONER

## 2020-08-25 PROCEDURE — 80053 COMPREHEN METABOLIC PANEL: CPT | Performed by: NURSE PRACTITIONER

## 2020-08-25 PROCEDURE — 80061 LIPID PANEL: CPT | Performed by: NURSE PRACTITIONER

## 2020-08-25 PROCEDURE — 99214 OFFICE O/P EST MOD 30 MIN: CPT | Performed by: NURSE PRACTITIONER

## 2020-08-25 PROCEDURE — 87086 URINE CULTURE/COLONY COUNT: CPT | Performed by: NURSE PRACTITIONER

## 2020-08-25 PROCEDURE — 85025 COMPLETE CBC W/AUTO DIFF WBC: CPT | Performed by: NURSE PRACTITIONER

## 2020-08-25 NOTE — PATIENT INSTRUCTIONS
MyPlate from USDA    MyPlate is an outline of a general healthy diet based on the 2010 Dietary Guidelines for Americans, from the U.S. Department of Agriculture (USDA). It sets guidelines for how much food you should eat from each food group based on your age, sex, and level of physical activity.  What are tips for following MyPlate?  To follow MyPlate recommendations:  · Eat a wide variety of fruits and vegetables, grains, and protein foods.  · Serve smaller portions and eat less food throughout the day.  · Limit portion sizes to avoid overeating.  · Enjoy your food.  · Get at least 150 minutes of exercise every week. This is about 30 minutes each day, 5 or more days per week.  It can be difficult to have every meal look like MyPlate. Think about MyPlate as eating guidelines for an entire day, rather than each individual meal.  Fruits and vegetables  · Make half of your plate fruits and vegetables.  · Eat many different colors of fruits and vegetables each day.  · For a 2,000 calorie daily food plan, eat:  ? 2½ cups of vegetables every day.  ? 2 cups of fruit every day.  · 1 cup is equal to:  ? 1 cup raw or cooked vegetables.  ? 1 cup raw fruit.  ? 1 medium-sized orange, apple, or banana.  ? 1 cup 100% fruit or vegetable juice.  ? 2 cups raw leafy greens, such as lettuce, spinach, or kale.  ? ½ cup dried fruit.  Grains  · One fourth of your plate should be grains.  · Make at least half of the grains you eat each day whole grains.  · For a 2,000 calorie daily food plan, eat 6 oz of grains every day.  · 1 oz is equal to:  ? 1 slice bread.  ? 1 cup cereal.  ? ½ cup cooked rice, cereal, or pasta.  Protein  · One fourth of your plate should be protein.  · Eat a wide variety of protein foods, including meat, poultry, fish, eggs, beans, nuts, and tofu.  · For a 2,000 calorie daily food plan, eat 5½ oz of protein every day.  · 1 oz is equal to:  ? 1 oz meat, poultry, or fish.  ? ¼ cup cooked beans.  ? 1 egg.  ? ½ oz nuts  or seeds.  ? 1 Tbsp peanut butter.  Dairy  · Drink fat-free or low-fat (1%) milk.  · Eat or drink dairy as a side to meals.  · For a 2,000 calorie daily food plan, eat or drink 3 cups of dairy every day.  · 1 cup is equal to:  ? 1 cup milk, yogurt, cottage cheese, or soy milk (soy beverage).  ? 2 oz processed cheese.  ? 1½ oz natural cheese.  Fats, oils, salt, and sugars  · Only small amounts of oils are recommended.  · Avoid foods that are high in calories and low in nutritional value (empty calories), like foods high in fat or added sugars.  · Choose foods that are low in salt (sodium). Choose foods that have less than 140 milligrams (mg) of sodium per serving.  · Drink water instead of sugary drinks. Drink enough water each day to keep your urine pale yellow.  Where to find support  · Work with your health care provider or a nutrition specialist (dietitian) to develop a customized eating plan that is right for you.  · Download an farhan (mobile application) to help you track your daily food intake.  Where to find more information  · Go to ChooseMyPlate.gov for more information.  Summary  · MyPlate is a general guideline for healthy eating from the USDA. It is based on the 2010 Dietary Guidelines for Americans.  · In general, fruits and vegetables should take up ½ of your plate, grains should take up ¼ of your plate, and protein should take up ¼ of your plate.  This information is not intended to replace advice given to you by your health care provider. Make sure you discuss any questions you have with your health care provider.  Document Released: 01/06/2009 Document Revised: 05/21/2020 Document Reviewed: 03/19/2018  Elsevier Patient Education © 2020 Elsevier Inc.      MyPlate from USDA    MyPlate is an outline of a general healthy diet based on the 2010 Dietary Guidelines for Americans, from the U.S. Department of Agriculture (USDA). It sets guidelines for how much food you should eat from each food group based on  your age, sex, and level of physical activity.  What are tips for following MyPlate?  To follow MyPlate recommendations:  · Eat a wide variety of fruits and vegetables, grains, and protein foods.  · Serve smaller portions and eat less food throughout the day.  · Limit portion sizes to avoid overeating.  · Enjoy your food.  · Get at least 150 minutes of exercise every week. This is about 30 minutes each day, 5 or more days per week.  It can be difficult to have every meal look like MyPlate. Think about MyPlate as eating guidelines for an entire day, rather than each individual meal.  Fruits and vegetables  · Make half of your plate fruits and vegetables.  · Eat many different colors of fruits and vegetables each day.  · For a 2,000 calorie daily food plan, eat:  ? 2½ cups of vegetables every day.  ? 2 cups of fruit every day.  · 1 cup is equal to:  ? 1 cup raw or cooked vegetables.  ? 1 cup raw fruit.  ? 1 medium-sized orange, apple, or banana.  ? 1 cup 100% fruit or vegetable juice.  ? 2 cups raw leafy greens, such as lettuce, spinach, or kale.  ? ½ cup dried fruit.  Grains  · One fourth of your plate should be grains.  · Make at least half of the grains you eat each day whole grains.  · For a 2,000 calorie daily food plan, eat 6 oz of grains every day.  · 1 oz is equal to:  ? 1 slice bread.  ? 1 cup cereal.  ? ½ cup cooked rice, cereal, or pasta.  Protein  · One fourth of your plate should be protein.  · Eat a wide variety of protein foods, including meat, poultry, fish, eggs, beans, nuts, and tofu.  · For a 2,000 calorie daily food plan, eat 5½ oz of protein every day.  · 1 oz is equal to:  ? 1 oz meat, poultry, or fish.  ? ¼ cup cooked beans.  ? 1 egg.  ? ½ oz nuts or seeds.  ? 1 Tbsp peanut butter.  Dairy  · Drink fat-free or low-fat (1%) milk.  · Eat or drink dairy as a side to meals.  · For a 2,000 calorie daily food plan, eat or drink 3 cups of dairy every day.  · 1 cup is equal to:  ? 1 cup milk, yogurt,  cottage cheese, or soy milk (soy beverage).  ? 2 oz processed cheese.  ? 1½ oz natural cheese.  Fats, oils, salt, and sugars  · Only small amounts of oils are recommended.  · Avoid foods that are high in calories and low in nutritional value (empty calories), like foods high in fat or added sugars.  · Choose foods that are low in salt (sodium). Choose foods that have less than 140 milligrams (mg) of sodium per serving.  · Drink water instead of sugary drinks. Drink enough water each day to keep your urine pale yellow.  Where to find support  · Work with your health care provider or a nutrition specialist (dietitian) to develop a customized eating plan that is right for you.  · Download an farhan (mobile application) to help you track your daily food intake.  Where to find more information  · Go to ChooseMyPlate.gov for more information.  Summary  · MyPlate is a general guideline for healthy eating from the USDA. It is based on the 2010 Dietary Guidelines for Americans.  · In general, fruits and vegetables should take up ½ of your plate, grains should take up ¼ of your plate, and protein should take up ¼ of your plate.  This information is not intended to replace advice given to you by your health care provider. Make sure you discuss any questions you have with your health care provider.  Document Released: 01/06/2009 Document Revised: 05/21/2020 Document Reviewed: 03/19/2018  Elsevier Patient Education © 2020 Elsevier Inc.

## 2020-08-25 NOTE — PROGRESS NOTES
Chief Complaint   Patient presents with   • Follow-up     Chronic Medical Conditions         Subjective     History of Present Illness     Patient is here today for follow-up of chronic problems.    Patient has class III severe obesity is up 9 pounds.  She is not following a specific diet or exercises and is not exercising.    Patient has insomnia and takes trazodone for sleep which is helpful.    Patient has hypertension blood pressure stable today.  Denies chest pain shortness of breath swelling.    Patient has a chronic low back pain.  No exacerbation of her symptoms.    Patient has acquired hypothyroidism takes levothyroxine on empty stomach an hour apart from other medications.    Patient has hyperlipidemia and takes simvastatin at bedtime without abdominal pain or myalgia notes limiting Tylenol alcohol avoid grapefruit-containing products.    Patient has GERD denies exacerbation of symptoms on Prevacid.  She takes medication 30 minutes prior to a meal.    Patient has constipation which is controlled at present.    Patient has seasonal allergies and takes fluticasone.  No exacerbation of symptoms.    Patient complains of acne.  Is used over-the-counter acne topical treatment without success.    Patient has glaucoma without change in vision.  Last visit with ophthalmology 12/10/2019 notes open angle glaucoma both eyes moderate stage combined form of age-related cataracts.  Patient does have upcoming appointment.    Patient has last mammogram 819 at [facility] normal          The following portions of the patient's history were reviewed and updated as appropriate: allergies, current medications, past family history, past medical history, past social history, past surgical history and problem list.      Current Outpatient Medications:   •  albuterol sulfate  (90 Base) MCG/ACT inhaler, Inhale 2 puffs Every 4 (Four) Hours As Needed for Wheezing., Disp: 1 inhaler, Rfl: 0  •  B Complex Vitamins (VITAMIN B  COMPLEX PO), Take 1 tablet by mouth Daily As Needed., Disp: , Rfl:   •  benzoyl peroxide 5 % gel, Apply 1 application topically to the appropriate area as directed 2 (Two) Times a Day. APPLY TO AFFECTED AREA, Disp: , Rfl:   •  buPROPion XL (WELLBUTRIN XL) 300 MG 24 hr tablet, TAKE 1 TABLET BY MOUTH DAILY, Disp: 30 tablet, Rfl: 5  •  cetirizine (zyrTEC) 10 MG tablet, TAKE 1 TABLET BY MOUTH ONCE DAILY, Disp: 30 tablet, Rfl: 4  •  Clindamycin Phos-Benzoyl Perox 1.2-5 % gel, Apply 1 dose topically to the appropriate area as directed Daily., Disp: 60 g, Rfl: 11  •  clindamycin-benzoyl peroxide (BENZACLIN) 1-5 % gel, Apply  topically to the appropriate area as directed 2 (Two) Times a Day., Disp: 50 g, Rfl: 3  •  fluticasone (FLONASE) 50 MCG/ACT nasal spray, 1 spray by Each Nare route Daily., Disp: 16 g, Rfl: 11  •  gabapentin (NEURONTIN) 400 MG capsule, Take 1 capsule by mouth 3 (Three) Times a Day., Disp: 90 capsule, Rfl: 0  •  hydrOXYzine (ATARAX) 25 MG tablet, take 1 tablet by mouth at bedtime if needed, Disp: 30 tablet, Rfl: 1  •  ketoconazole (NIZORAL) 2 % shampoo, USE AS DIRECTED TWICE A DAY, Disp: 120 mL, Rfl: 0  •  lansoprazole (PREVACID) 30 MG capsule, TAKE 1 CAPSULE BY MOUTH DAILY, Disp: 30 capsule, Rfl: 0  •  latanoprost (XALATAN) 0.005 % ophthalmic solution, Administer 1 drop to both eyes every night., Disp: , Rfl:   •  linaclotide (Linzess) 72 MCG capsule capsule, Take 1 capsule by mouth Every Morning., Disp: 30 capsule, Rfl: 0  •  lisinopril (PRINIVIL,ZESTRIL) 30 MG tablet, TAKE 1 TABLET BY MOUTH EVERYDAY AT BEDTIME, Disp: 30 tablet, Rfl: 0  •  meloxicam (MOBIC) 7.5 MG tablet, Take 1 tablet by mouth Daily., Disp: 30 tablet, Rfl: 5  •  mometasone-formoterol (DULERA) 100-5 MCG/ACT inhaler, , Disp: , Rfl:   •  Multiple Vitamins-Minerals (HAIR/SKIN/NAILS/BIOTIN) tablet, Take 1 tablet by mouth Daily As Needed., Disp: , Rfl:   •  polyethylene glycol (MIRALAX) packet, Take 17 g by mouth Daily., Disp: 72 packet,  Rfl: 2  •  RA FIBER THERAPY 625 MG tablet, , Disp: , Rfl: 1  •  sertraline (ZOLOFT) 100 MG tablet, TAKE 1 TABLET BY MOUTH DAILY, Disp: 30 tablet, Rfl: 0  •  simvastatin (ZOCOR) 20 MG tablet, Take 1 tablet by mouth every night at bedtime., Disp: 30 tablet, Rfl: 3  •  tiotropium (SPIRIVA HANDIHALER) 18 MCG per inhalation capsule, INHALE CONTENTS OF 1 CAPSULE BY MOUTH ONCE DAILY, Disp: 30 capsule, Rfl: 0  •  traZODone (DESYREL) 50 MG tablet, TAKE 1 TABLET BY MOUTH EVERY DAY AT NIGHT, Disp: 30 tablet, Rfl: 0  •  vitamin B-12 (CYANOCOBALAMIN) 1000 MCG tablet, Take 5,000 mcg by mouth Daily As Needed., Disp: , Rfl:   •  levothyroxine (SYNTHROID, LEVOTHROID) 75 MCG tablet, Take 1 tablet by mouth Daily., Disp: 30 tablet, Rfl: 0    Vitals:    08/25/20 1125   BP: 126/80   Pulse: 78   Resp: 18   Temp: 98.9 °F (37.2 °C)   SpO2: 97%       Body mass index is 41.92 kg/m².        Review of Systems   Constitutional: Negative for activity change, appetite change, chills, fatigue and fever.   HENT: Negative for congestion, postnasal drip and sore throat.    Eyes: Negative for visual disturbance.   Respiratory: Negative for cough and shortness of breath.    Cardiovascular: Negative for chest pain, palpitations and leg swelling.   Gastrointestinal: Negative for abdominal pain, constipation, diarrhea, nausea and GERD.   Musculoskeletal: Negative for arthralgias and myalgias.   Skin: Negative for rash.   Allergic/Immunologic: Negative for environmental allergies.   Neurological: Negative for dizziness.   Psychiatric/Behavioral: Negative for sleep disturbance.   All other systems reviewed and are negative.      Objective   Physical Exam   Constitutional: She is oriented to person, place, and time. She appears well-developed and well-nourished.   HENT:   Head: Normocephalic and atraumatic.   Right Ear: External ear normal.   Left Ear: External ear normal.   Nose: Nose normal.   Mouth/Throat: Oropharynx is clear and moist.   Neck: No  thyromegaly present.   Cardiovascular: Normal rate, regular rhythm and intact distal pulses.   Pulmonary/Chest: Effort normal and breath sounds normal.   Abdominal: Soft. Bowel sounds are normal. She exhibits no distension. There is no tenderness.   Musculoskeletal: She exhibits no edema.   Lymphadenopathy:     She has no cervical adenopathy.   Neurological: She is alert and oriented to person, place, and time.   Skin: Capillary refill takes 2 to 3 seconds.   Psychiatric: She has a normal mood and affect. Her behavior is normal.   Nursing note and vitals reviewed.        Assessment/Plan   Adrianne was seen today for follow-up.    Diagnoses and all orders for this visit:    Class 3 severe obesity due to excess calories with serious comorbidity and body mass index (BMI) of 40.0 to 44.9 in adult (CMS/[facility])    Insomnia, unspecified type  -     CBC & Differential  -     CBC Auto Differential  -     Manual Differential; Future  -     Cancel: Manual Differential    Essential hypertension  -     Comprehensive Metabolic Panel  -     POC Urinalysis Dipstick, Automated    Chronic low back pain without sciatica, unspecified back pain laterality    Acquired hypothyroidism  -     TSH    Hyperlipidemia, unspecified hyperlipidemia type  -     Comprehensive Metabolic Panel  -     Lipid Panel    Gastroesophageal reflux disease without esophagitis  -     CBC & Differential  -     CBC Auto Differential  -     Manual Differential; Future  -     Cancel: Manual Differential    Other constipation    Anxiety with depression    Seasonal allergic rhinitis due to other allergic trigger  -     CBC & Differential  -     CBC Auto Differential  -     Manual Differential; Future  -     Cancel: Manual Differential    Acne, unspecified acne type    Glaucoma of both eyes, unspecified glaucoma type    Leukocytes in urine  -     Urine Culture - Urine, Urine, Clean Catch    Breast cancer screening by mammogram    Mammogram is ordered and upcoming    Wants  to hold shingrix until post covid    Continue diet and exercise  Discussed the patient's BMI with her. BMI is above normal parameters. Recommendations include: educational material.    Return in about 6 months (around 2/25/2021) for fasting, Next scheduled follow up.  RTC/call  If symptoms worsen  Meds MOA and SE's reviewed and pt v/u

## 2020-08-26 LAB — BACTERIA SPEC AEROBE CULT: NO GROWTH

## 2020-08-30 DIAGNOSIS — E03.9 ACQUIRED HYPOTHYROIDISM: ICD-10-CM

## 2020-08-30 RX ORDER — LEVOTHYROXINE SODIUM 0.07 MG/1
75 TABLET ORAL DAILY
Qty: 30 TABLET | Refills: 0 | Status: SHIPPED | OUTPATIENT
Start: 2020-08-30 | End: 2020-09-28

## 2020-09-01 DIAGNOSIS — G89.29 CHRONIC LOW BACK PAIN WITHOUT SCIATICA, UNSPECIFIED BACK PAIN LATERALITY: ICD-10-CM

## 2020-09-01 DIAGNOSIS — M54.50 CHRONIC LOW BACK PAIN WITHOUT SCIATICA, UNSPECIFIED BACK PAIN LATERALITY: ICD-10-CM

## 2020-09-01 RX ORDER — MELOXICAM 7.5 MG/1
TABLET ORAL
Qty: 30 TABLET | Refills: 2 | Status: SHIPPED | OUTPATIENT
Start: 2020-09-01 | End: 2020-12-21

## 2020-09-07 DIAGNOSIS — E78.5 HYPERLIPIDEMIA, UNSPECIFIED HYPERLIPIDEMIA TYPE: ICD-10-CM

## 2020-09-07 DIAGNOSIS — G89.29 CHRONIC LOW BACK PAIN WITHOUT SCIATICA, UNSPECIFIED BACK PAIN LATERALITY: ICD-10-CM

## 2020-09-07 DIAGNOSIS — M54.50 CHRONIC LOW BACK PAIN WITHOUT SCIATICA, UNSPECIFIED BACK PAIN LATERALITY: ICD-10-CM

## 2020-09-08 ENCOUNTER — TELEPHONE (OUTPATIENT)
Dept: INTERNAL MEDICINE | Facility: CLINIC | Age: 55
End: 2020-09-08

## 2020-09-10 RX ORDER — SIMVASTATIN 20 MG
TABLET ORAL
Qty: 30 TABLET | Refills: 0 | OUTPATIENT
Start: 2020-09-10

## 2020-09-10 RX ORDER — GABAPENTIN 400 MG/1
CAPSULE ORAL
Qty: 90 CAPSULE | Refills: 0 | OUTPATIENT
Start: 2020-09-10

## 2020-09-10 RX ORDER — SIMVASTATIN 20 MG
20 TABLET ORAL
Qty: 30 TABLET | Refills: 3 | Status: SHIPPED | OUTPATIENT
Start: 2020-09-10 | End: 2021-01-13

## 2020-09-10 NOTE — TELEPHONE ENCOUNTER
Julia-Please call patient to review with patient regarding her gabapentin.  The patient has had a refill by Dr. Moe and Dr. Motta most recently.  I do not see that she is taking this medication every month.  Please clarify with patient how often she is taking this medication if she is planning on being this medication consistently she will need to return to clinic for UDS and will need to run a Jose.

## 2020-09-10 NOTE — TELEPHONE ENCOUNTER
Per Telephone message from Elvis today:  Please let the patient know that we are not able to complete the gabapentin due to her history of illicit drug use and use of Suboxone.  She will need to be set up with pain management and followed if she would like to continue receiving gabapentin.  We did have this conversation when I initially took over her care.

## 2020-09-11 DIAGNOSIS — I10 ESSENTIAL HYPERTENSION: ICD-10-CM

## 2020-09-11 DIAGNOSIS — G47.00 INSOMNIA, UNSPECIFIED TYPE: ICD-10-CM

## 2020-09-11 RX ORDER — TRAZODONE HYDROCHLORIDE 50 MG/1
TABLET ORAL
Qty: 30 TABLET | Refills: 0 | Status: SHIPPED | OUTPATIENT
Start: 2020-09-11 | End: 2020-10-12

## 2020-09-11 RX ORDER — LISINOPRIL 30 MG/1
TABLET ORAL
Qty: 30 TABLET | Refills: 0 | Status: SHIPPED | OUTPATIENT
Start: 2020-09-11 | End: 2020-10-12

## 2020-09-27 DIAGNOSIS — E03.9 ACQUIRED HYPOTHYROIDISM: ICD-10-CM

## 2020-09-28 RX ORDER — LEVOTHYROXINE SODIUM 0.07 MG/1
TABLET ORAL
Qty: 30 TABLET | Refills: 0 | Status: SHIPPED | OUTPATIENT
Start: 2020-09-28 | End: 2021-03-08 | Stop reason: SDUPTHER

## 2020-10-04 RX ORDER — CETIRIZINE HYDROCHLORIDE 10 MG/1
TABLET ORAL
Qty: 30 TABLET | Refills: 3 | Status: SHIPPED | OUTPATIENT
Start: 2020-10-04 | End: 2021-03-08 | Stop reason: SDUPTHER

## 2020-10-12 DIAGNOSIS — G47.00 INSOMNIA, UNSPECIFIED TYPE: ICD-10-CM

## 2020-10-12 DIAGNOSIS — I10 ESSENTIAL HYPERTENSION: ICD-10-CM

## 2020-10-12 RX ORDER — LISINOPRIL 30 MG/1
TABLET ORAL
Qty: 30 TABLET | Refills: 0 | Status: SHIPPED | OUTPATIENT
Start: 2020-10-12 | End: 2020-11-12

## 2020-10-12 RX ORDER — TRAZODONE HYDROCHLORIDE 50 MG/1
TABLET ORAL
Qty: 30 TABLET | Refills: 3 | Status: SHIPPED | OUTPATIENT
Start: 2020-10-12 | End: 2021-02-10

## 2020-10-13 ENCOUNTER — TELEPHONE (OUTPATIENT)
Dept: INTERNAL MEDICINE | Facility: CLINIC | Age: 55
End: 2020-10-13

## 2020-10-13 NOTE — TELEPHONE ENCOUNTER
S/w patient informed her that she was past due on getting her lab work done for her Thyroid levels.  She gave verbal understanding and stated would be in this week , lab hours given.

## 2020-10-20 RX ORDER — KETOCONAZOLE 20 MG/ML
SHAMPOO TOPICAL
Qty: 120 ML | Refills: 1 | Status: SHIPPED | OUTPATIENT
Start: 2020-10-20 | End: 2021-04-26

## 2020-11-05 ENCOUNTER — TELEPHONE (OUTPATIENT)
Dept: INTERNAL MEDICINE | Facility: CLINIC | Age: 55
End: 2020-11-05

## 2020-11-05 NOTE — TELEPHONE ENCOUNTER
LVM for patient informed her that she was past due on getting her lab work done for her Thyroid levels.  She gave verbal understanding and stated would be in this week , lab hours given.

## 2020-11-11 DIAGNOSIS — I10 ESSENTIAL HYPERTENSION: ICD-10-CM

## 2020-11-12 RX ORDER — LISINOPRIL 30 MG/1
TABLET ORAL
Qty: 30 TABLET | Refills: 4 | Status: SHIPPED | OUTPATIENT
Start: 2020-11-12 | End: 2021-03-08 | Stop reason: SDUPTHER

## 2020-11-12 NOTE — TELEPHONE ENCOUNTER
Last Office Visit: 08/25/2020  Next Office Visit:02/25/2021    Labs completed in past 6 months? yes  Labs completed in past year? yes    Last Refill Date:10/12/2020  Quantity:30  Refills:0    Pharmacy: [facility]/pharmacy #[id number] - [place] - [address] AT [facility] [phone] [facility] [phone] FX

## 2020-11-28 DIAGNOSIS — E03.9 ACQUIRED HYPOTHYROIDISM: ICD-10-CM

## 2020-12-01 RX ORDER — LEVOTHYROXINE SODIUM 0.07 MG/1
TABLET ORAL
Qty: 30 TABLET | Refills: 0 | OUTPATIENT
Start: 2020-12-01

## 2020-12-03 NOTE — TELEPHONE ENCOUNTER
Spoke to patient and gave her your message and recommendations. Patient stated that she has only been taking it every so often not everyday. Patient stated that she will start taking it everyday and will return for labs in a month

## 2020-12-19 DIAGNOSIS — G89.29 CHRONIC LOW BACK PAIN WITHOUT SCIATICA, UNSPECIFIED BACK PAIN LATERALITY: ICD-10-CM

## 2020-12-19 DIAGNOSIS — M54.50 CHRONIC LOW BACK PAIN WITHOUT SCIATICA, UNSPECIFIED BACK PAIN LATERALITY: ICD-10-CM

## 2020-12-21 RX ORDER — MELOXICAM 7.5 MG/1
TABLET ORAL
Qty: 30 TABLET | Refills: 2 | Status: SHIPPED | OUTPATIENT
Start: 2020-12-21 | End: 2021-03-17

## 2020-12-21 NOTE — TELEPHONE ENCOUNTER
Last Office Visit: 8/25/2020  Next Office Visit: 2/25/2021    Labs completed in past 6 months? yes  Labs completed in past year? yes    Last Refill Date: 9/1/2020  Quantity: 30  Refills: 2    Pharmacy: [facility]/pharmacy #[id number] - [place], [place] - [address] [facility] AT [facility] [phone] [facility] [phone] FX

## 2021-01-07 ENCOUNTER — TELEPHONE (OUTPATIENT)
Dept: INTERNAL MEDICINE | Facility: CLINIC | Age: 56
End: 2021-01-07

## 2021-01-07 NOTE — TELEPHONE ENCOUNTER
S/w patient informed her that she was late on getting her labs drawn.  Lab hours were given she gave verbal understanding.

## 2021-01-09 DIAGNOSIS — E78.5 HYPERLIPIDEMIA, UNSPECIFIED HYPERLIPIDEMIA TYPE: ICD-10-CM

## 2021-01-11 NOTE — PROGRESS NOTES
Subjective   Adrianne Lowe is a 51 y.o. female.   Chief Complaint   Patient presents with   • Hand Pain     Follow up on R hand        History of Present Illness     Pt here for 1 month follow up of carpal tunnel syndrome in the Right hand.  She was instructed to wear a carpal tunnel wrist brace at night for at least 4 weeks which she has been fairly compliant with.  She says that she continues to have pain in the right hand and has pain extending up to her R elbow. She also has numbness at times in her thumb, pointer and middle fingers of the right hand.     Has trouble sleeping.  Sometimes wont fall asleep until 3-5am.  She has taken tylenol PM without much help.      The following portions of the patient's history were reviewed and updated as appropriate: allergies, current medications, past family history, past medical history, past social history, past surgical history and problem list.    Review of Systems   Constitutional: Negative.    HENT: Negative.    Cardiovascular: Negative.    Gastrointestinal: Negative.    Musculoskeletal: Negative.         As noted in HPI   Neurological: Positive for numbness.       Objective   Physical Exam   Constitutional: She appears well-developed and well-nourished.   Cardiovascular: Normal rate, regular rhythm and normal heart sounds.    Pulmonary/Chest: Effort normal and breath sounds normal.   Musculoskeletal:   Positive phalens on right.   Psychiatric: She has a normal mood and affect. Her behavior is normal. Judgment and thought content normal.       Assessment/Plan   Adrianne was seen today for hand pain.    Diagnoses and all orders for this visit:    Carpal tunnel syndrome on right    Seasonal allergic rhinitis due to other allergic trigger  -     cetirizine (zyrTEC) 10 MG tablet; Take 1 tablet by mouth Daily.    Insomnia, unspecified type  -     hydrOXYzine (ATARAX) 25 MG tablet; Take 1 tablet by mouth At Night As Needed (insomnia).      Discussed referral to ortho  for further management of carpal tunnel, however, patient would like to wear the wrist brace for a couple of months to see if her symptoms will gradually improve.  She will let us know if she wants referral.           Detail Level: Zone Dupixent Monitoring Guidelines: There is no laboratory monitoring requirement with Dupixent. Is Methotrexate Contraindicated?: No Dupixent Dosing: 600 mg SC day 0 then 300 mg SC every other week Diagnosis (Required): Atopic Dermatitis/Eczematous Dermatitis Pregnancy And Lactation Warning Text: There have not been adverse fetal risks in women taking Dupixent while pregnant. It is unknown if this medication is excreted in breast milk.

## 2021-01-14 RX ORDER — SIMVASTATIN 20 MG
TABLET ORAL
Qty: 30 TABLET | Refills: 1 | Status: SHIPPED | OUTPATIENT
Start: 2021-01-14 | End: 2021-03-04

## 2021-02-08 DIAGNOSIS — G47.00 INSOMNIA, UNSPECIFIED TYPE: ICD-10-CM

## 2021-02-08 NOTE — TELEPHONE ENCOUNTER
LOV:08/25/2020  NOV:02/25/2021  Labs:08/30/2020     Return in about 6 months (around 2/25/2021) for fasting, Next scheduled follow up.

## 2021-02-10 RX ORDER — TRAZODONE HYDROCHLORIDE 50 MG/1
TABLET ORAL
Qty: 30 TABLET | Refills: 1 | Status: SHIPPED | OUTPATIENT
Start: 2021-02-10 | End: 2021-03-08 | Stop reason: SDUPTHER

## 2021-02-15 ENCOUNTER — TELEPHONE (OUTPATIENT)
Dept: INTERNAL MEDICINE | Facility: CLINIC | Age: 56
End: 2021-02-15

## 2021-02-15 NOTE — TELEPHONE ENCOUNTER
Pt stated her nose is burning and hurting causing a headache. Pt added she has not been around anyone Dx with covid-19.  Pt is scheduled at 2:30 pm today for VV with PCP.

## 2021-02-15 NOTE — TELEPHONE ENCOUNTER
PATIENT IS REQUESTING IF AN ANTIBIOTIC CAN BE CALLED INTO THE PHARMACY FOR A SINUS INFECTION.    PLEASE ADVISE    PATIENT USES [facility] PHARMACY [facility]    CALL BACK NUMBER [phone]

## 2021-02-17 ENCOUNTER — TELEPHONE (OUTPATIENT)
Dept: INTERNAL MEDICINE | Facility: CLINIC | Age: 56
End: 2021-02-17

## 2021-02-17 PROCEDURE — U0004 COV-19 TEST NON-CDC HGH THRU: HCPCS | Performed by: FAMILY MEDICINE

## 2021-02-17 NOTE — TELEPHONE ENCOUNTER
Caller: Adrianne Lowe    Relationship: Self    Best call back number: [phone]     What medication are you requesting: ANTIBIOTICS     What are your current symptoms: BAD TASTE IN MOUTH, NASAL DRAINAGE    How long have you been experiencing symptoms: 6 DAYS    Have you had these symptoms before:    [x] Yes  [] No    Have you been treated for these symptoms before:   [x] Yes  [] No    If a prescription is needed, what is your preferred pharmacy and phone number:  [facility]/pharmacy #[id number] - [place], [place] - [address] [place] [address] AT [facility] - [phone] [facility] [phone]

## 2021-02-17 NOTE — TELEPHONE ENCOUNTER
Though symptoms could be concerning for COVID-19 or other viral infection.  Recommend she make a video visit with available provider for appropriate testing and medications to be prescribed as indicated.

## 2021-02-18 ENCOUNTER — TELEPHONE (OUTPATIENT)
Dept: URGENT CARE | Facility: CLINIC | Age: 56
End: 2021-02-18

## 2021-02-19 ENCOUNTER — TELEMEDICINE (OUTPATIENT)
Dept: INTERNAL MEDICINE | Facility: CLINIC | Age: 56
End: 2021-02-19

## 2021-02-19 DIAGNOSIS — H92.03 OTALGIA OF BOTH EARS: ICD-10-CM

## 2021-02-19 DIAGNOSIS — U07.1 COVID-19 VIRUS INFECTION: ICD-10-CM

## 2021-02-19 DIAGNOSIS — J06.9 VIRAL URI: Primary | ICD-10-CM

## 2021-02-19 PROCEDURE — 99213 OFFICE O/P EST LOW 20 MIN: CPT | Performed by: NURSE PRACTITIONER

## 2021-02-19 RX ORDER — AZELASTINE 1 MG/ML
2 SPRAY, METERED NASAL DAILY
Qty: 1 EACH | Refills: 12 | Status: SHIPPED | OUTPATIENT
Start: 2021-02-19 | End: 2022-03-01

## 2021-02-19 RX ORDER — AMOXICILLIN AND CLAVULANATE POTASSIUM 875; 125 MG/1; MG/1
1 TABLET, FILM COATED ORAL 2 TIMES DAILY
Qty: 20 TABLET | Refills: 0 | Status: SHIPPED | OUTPATIENT
Start: 2021-02-19 | End: 2021-03-08

## 2021-02-19 RX ORDER — BENZONATATE 100 MG/1
100 CAPSULE ORAL 3 TIMES DAILY PRN
Qty: 30 CAPSULE | Refills: 0 | Status: SHIPPED | OUTPATIENT
Start: 2021-02-19 | End: 2021-03-08

## 2021-02-19 NOTE — PROGRESS NOTES
Chief Complaint  Earache    Subjective          Adrianne Lowe presents to [facility] MEDIC[facility] P[facility]ATRICS  History of Present Illness  This was an audio and video enabled telemedicine encounter.  [email or web address] due to pandemic  She was seen at the walk in clinic 2/17 for covid and she was given medication including medrol and doxycycline.   She has runny stuffy nose cough productive green and yellow St and ear pain B.   she has a different taste and smell -somewhat malodorous no recent fever sweats chills no nausea vomiting diarrhea no chest pain possibly mild shortness of breath with activity she thinks.        Review of Systems   Constitutional: Positive for fatigue. Negative for activity change, appetite change, chills and diaphoresis.   HENT: Positive for congestion, ear pain and sinus pressure.    Respiratory: Positive for cough. Negative for shortness of breath.    Cardiovascular: Negative for chest pain.     Objective   Vital Signs: unabel to check VS  There were no vitals taken for this visit.    Physical Exam   Result Review :                 Assessment and Plan    Diagnoses and all orders for this visit:    1. Viral URI (Primary)  -     azelastine (ASTELIN) 0.1 % nasal spray; 2 sprays into the nostril(s) as directed by provider Daily. Use in each nostril as directed  Dispense: 1 each; Refill: 12  -     benzonatate (Tessalon Perles) 100 MG capsule; Take 1 capsule by mouth 3 (Three) Times a Day As Needed for Cough.  Dispense: 30 capsule; Refill: 0  -     amoxicillin-clavulanate (Augmentin) 875-125 MG per tablet; Take 1 tablet by mouth 2 (Two) Times a Day.  Dispense: 20 tablet; Refill: 0    2. COVID-19 virus infection  -     azelastine (ASTELIN) 0.1 % nasal spray; 2 sprays into the nostril(s) as directed by provider Daily. Use in each nostril as directed  Dispense: 1 each; Refill: 12  -     benzonatate (Tessalon Perles) 100 MG capsule; Take 1 capsule by mouth 3 (Three)  Times a Day As Needed for Cough.  Dispense: 30 capsule; Refill: 0  -     amoxicillin-clavulanate (Augmentin) 875-125 MG per tablet; Take 1 tablet by mouth 2 (Two) Times a Day.  Dispense: 20 tablet; Refill: 0    3. Otalgia of both ears  -     azelastine (ASTELIN) 0.1 % nasal spray; 2 sprays into the nostril(s) as directed by provider Daily. Use in each nostril as directed  Dispense: 1 each; Refill: 12  -     benzonatate (Tessalon Perles) 100 MG capsule; Take 1 capsule by mouth 3 (Three) Times a Day As Needed for Cough.  Dispense: 30 capsule; Refill: 0  -     amoxicillin-clavulanate (Augmentin) 875-125 MG per tablet; Take 1 tablet by mouth 2 (Two) Times a Day.  Dispense: 20 tablet; Refill: 0           Total visit time 10 minutes  UpperAdd Augmentin in case doxycycline is not sufficiently covering.  She does have a malodor to her taste and smell and ear pain.  Will also have her continue the Doxy and will add over-the-counter Afrin as directed and Astelin daily for most likely what is eustachian tube dysfunction.  If things she is not getting better she will let me know Monday at any point if she is worse she will go to the ER    If develops SOB CP go to ER.   Follow Up   Return for one month, fasting, Next scheduled follow up.   Start afrin 5days only then next week if needed 3 additional days.   Patient was given instructions and counseling regarding her condition or for health maintenance advice. Please see specific information pulled into the AVS if appropriate.

## 2021-02-23 DIAGNOSIS — J43.9 ACUTE EXACERBATION OF EMPHYSEMA (HCC): ICD-10-CM

## 2021-02-23 DIAGNOSIS — J01.00 ACUTE MAXILLARY SINUSITIS, RECURRENCE NOT SPECIFIED: ICD-10-CM

## 2021-02-23 NOTE — TELEPHONE ENCOUNTER
Caller: Adrianne Lowe    Relationship: Self    Best call back number: [phone]    Medication needed:   Requested Prescriptions     Pending Prescriptions Disp Refills   • doxycycline (MONODOX) 100 MG capsule 20 capsule 0     Sig: Take 1 capsule by mouth 2 (Two) Times a Day.   • predniSONE (DELTASONE) 20 MG tablet 14 tablet 0     Sig: Take 1 tablet by mouth 2 (Two) Times a Day.       When do you need the refill by: 02/23/21    What details did the patient provide when requesting the medication: patient will be out of medication tomorrow     Does the patient have less than a 3 day supply:  [x] Yes  [] No    What is the patient's preferred pharmacy: [facility]/PHARMACY #[id number] - [place], [place] - [address] [place] [address] AT [facility] - [phone]  - [phone]

## 2021-02-24 ENCOUNTER — TELEPHONE (OUTPATIENT)
Dept: INTERNAL MEDICINE | Facility: CLINIC | Age: 56
End: 2021-02-24

## 2021-02-24 RX ORDER — PREDNISONE 20 MG/1
20 TABLET ORAL
Qty: 14 TABLET | Refills: 0 | OUTPATIENT
Start: 2021-02-24

## 2021-02-24 RX ORDER — DOXYCYCLINE 100 MG/1
100 CAPSULE ORAL 2 TIMES DAILY
Qty: 20 CAPSULE | Refills: 0 | OUTPATIENT
Start: 2021-02-24

## 2021-02-24 NOTE — TELEPHONE ENCOUNTER
Pt wants to know if Covid medicine got sent tp pharmacy please advise cancelled appointment she tested positive on the 17th.

## 2021-02-24 NOTE — TELEPHONE ENCOUNTER
Spoke with Pt. Ms. Silver stated she is feeling okay at this time. Please advise if video visit need to be rescheduled for tomorrow.

## 2021-02-24 NOTE — TELEPHONE ENCOUNTER
----- Message from ROMI Penny sent at 2/19/2021  3:09 PM EST -----  One month fasting f/u chronic problems

## 2021-02-24 NOTE — TELEPHONE ENCOUNTER
Patient was seen in the walk-in clinic approximately 10 days ago given doxycycline and Medrol Dosepak.  5 days ago patient had a video visit with me and Augmentin was added.  The patient is continuing to have symptoms she will need to have an additional video visit.  Please schedule

## 2021-03-04 DIAGNOSIS — E78.5 HYPERLIPIDEMIA, UNSPECIFIED HYPERLIPIDEMIA TYPE: ICD-10-CM

## 2021-03-04 RX ORDER — SIMVASTATIN 20 MG
TABLET ORAL
Qty: 30 TABLET | Refills: 0 | Status: SHIPPED | OUTPATIENT
Start: 2021-03-04 | End: 2021-04-03

## 2021-03-07 NOTE — PROGRESS NOTES
Chief Complaint  Allergies    Subjective          Adrianne Fernandez Lowe presents to [facility] MEDICINE [facility] PED[facility]ATRICS  History of Present Illness  Patient recently seen in the [facility] 2/17/2021 with URI.  She followed up with me by telehealth-Doxy for continued symptoms on Medrol and doxycycline.  She was prescribed Astelin and Tessalon as well as Augmentin. Symptoms near resolved she is a little stuffy.    She has seasonal allergies-taking Zyrtec and Astelin-not taking.    She has hypertension. Blood pressure at home-not checking patient denies chest pain shortness of breath headache dizziness passing out.  Patient taking lisinopril 30 mg daily.    Patient has COPD/asthma on Spiriva and not taking Dulera due to cost.    Patient has hyperlipidemia on simvastatin at bedtime.  Denies abdominal pain or myalgias.  She knows to limit Tylenol or alcohol.  She knows to avoid grapefruit.  She is not specifically following a diet or exercising.     Patient has class III severe obesity.  Weight today-243    Patient has acquired hypothyroidism she knows to take this  Synthroid daily on empty stomach an hour apart from med occasions.  She is inconsistent  With taking takes every other day due to making her feels like she has tachycardia.     Patient has insomnia taking trazodone for sleep.  No exacerbations?    pt has anxiety and depression- takes wellbutrin.    The patient has GERD takes Prevacid without exacerbated symptoms.    Patient has chronic constipation on Linzess takes every day and Bm every day. Uses miralax prn too.    Patient has Mobic prescribed via Dr Russell last month.  Takes for pain in shoulder. LBP , and  hips and legs  On a 1-10 scale can be high has 8. Mostly hips and legs.  Has not doen PT in awhile.    Reviewed with patient importance of avoiding daily NSAIDs and over-the-counter NSAID combination.  on gabapentin rx by myself.      Patient has primary open-angle glaucoma and  cataracts.  Followed by ophthalmology last visit-due patient will schedule      Review of systems no headache dizziness passing out no chest pain shortness of breath swelling no abdominal symptoms no change in bowel bladder habits no new lumps or bumps or rashes.  Patient continues with chronic low back pain bilateral hips legs.      Objective   Vital Signs:   There were no vitals taken for this visit.    Physical Exam  Vitals and nursing note reviewed.   Constitutional:       Appearance: She is well-developed.   HENT:      Head: Normocephalic and atraumatic.      Right Ear: External ear normal.      Left Ear: External ear normal.      Nose: Nose normal.   Neck:      Thyroid: No thyromegaly.   Cardiovascular:      Rate and Rhythm: Normal rate and regular rhythm.   Pulmonary:      Effort: Pulmonary effort is normal.      Breath sounds: Normal breath sounds.   Abdominal:      General: Bowel sounds are normal. There is no distension.      Palpations: Abdomen is soft.      Tenderness: There is no abdominal tenderness.   Lymphadenopathy:      Cervical: No cervical adenopathy.   Skin:     Capillary Refill: Capillary refill takes 2 to 3 seconds.   Neurological:      Mental Status: She is alert and oriented to person, place, and time.   Psychiatric:         Behavior: Behavior normal.        Result Review :                 Assessment and Plan    Diagnoses and all orders for this visit:    1. COVID-19 virus infection (Primary)    2. Seasonal allergies  -     CBC & Differential; Future  -     mometasone (Nasonex) 50 MCG/ACT nasal spray; 2 sprays into the nostril(s) as directed by provider Daily.  Dispense: 1 each; Refill: 12    3. Essential hypertension  -     Comprehensive Metabolic Panel; Future  -     POC Urinalysis Dipstick, Automated; Future  -     lisinopril (PRINIVIL,ZESTRIL) 30 MG tablet; Take 1 tablet by mouth every night at bedtime.  Dispense: 90 tablet; Refill: 1    4. Chronic obstructive pulmonary disease,  unspecified COPD type (CMS/[facility])  -     tiotropium (Spiriva HandiHaler) 18 MCG per inhalation capsule; INHALE CONTENTS OF 1 CAPSULE BY MOUTH ONCE DAILY  Dispense: 90 capsule; Refill: 1    5. Hyperlipidemia, unspecified hyperlipidemia type  -     Comprehensive Metabolic Panel; Future  -     Lipid Panel; Future    6. Class 3 severe obesity due to excess calories with serious comorbidity and body mass index (BMI) of 40.0 to 44.9 in adult (CMS/[facility])    7. Acquired hypothyroidism  -     TSH; Future  -     levothyroxine (SYNTHROID, LEVOTHROID) 50 MCG tablet; Take 1 tablet by mouth Daily.  Dispense: 90 tablet; Refill: 1    8. Insomnia, unspecified type  -     traZODone (DESYREL) 50 MG tablet; Take 1 tablet by mouth Every Night.  Dispense: 90 tablet; Refill: 1    9. Anxiety with depression    10. Gastroesophageal reflux disease without esophagitis  -     CBC & Differential; Future  -     Comprehensive Metabolic Panel; Future  -     POC Urinalysis Dipstick, Automated; Future  -     lansoprazole (PREVACID) 30 MG capsule; Take 1 capsule by mouth Daily.  Dispense: 90 capsule; Refill: 1    11. Other constipation    12. Chronic low back pain without sciatica, unspecified back pain laterality  -     Ambulatory Referral to Physical Therapy Evaluate and treat  -     gabapentin (NEURONTIN) 400 MG capsule; Take 1 capsule by mouth 3 (Three) Times a Day.  Dispense: 90 capsule; Refill: 0    13. Constipation, unspecified constipation type  -     linaclotide (Linzess) 72 MCG capsule capsule; Take 1 capsule by mouth Every Morning.  Dispense: 90 capsule; Refill: 1    14. Anxiety  -     buPROPion XL (WELLBUTRIN XL) 300 MG 24 hr tablet; Take 1 tablet by mouth Daily.  Dispense: 90 tablet; Refill: 0    15. Depression, unspecified depression type  -     buPROPion XL (WELLBUTRIN XL) 300 MG 24 hr tablet; Take 1 tablet by mouth Daily.  Dispense: 90 tablet; Refill: 0    16. High risk medication use  -     Urine Drug Screen - Urine, Clean Catch;  Future    Other orders  -     polyethylene glycol (MIRALAX) 17 g packet; Take 17 g by mouth Daily.  Dispense: 90 packet; Refill: 1  -     cetirizine (zyrTEC) 10 MG tablet; Take 1 tablet by mouth Daily.  Dispense: 90 tablet; Refill: 1      Decreased pt synthroid from 75mcg to 50mcg in hopes that she willl take every day and have rechecked in 1 month since she was having tachycardia.  Time 1036 the morning how are you  1056  Next one month will rtc for labs recheck tsh and sign csa and uds          Follow Up   Return in about 3 months (around 6/8/2021) for fasting, Next scheduled follow up.  Patient was given instructions and counseling regarding her condition or for health maintenance advice. Please see specific information pulled into the AVS if appropriate.

## 2021-03-08 ENCOUNTER — TELEMEDICINE (OUTPATIENT)
Dept: INTERNAL MEDICINE | Facility: CLINIC | Age: 56
End: 2021-03-08

## 2021-03-08 DIAGNOSIS — G89.29 CHRONIC LOW BACK PAIN WITHOUT SCIATICA, UNSPECIFIED BACK PAIN LATERALITY: ICD-10-CM

## 2021-03-08 DIAGNOSIS — Z79.899 HIGH RISK MEDICATION USE: ICD-10-CM

## 2021-03-08 DIAGNOSIS — I10 ESSENTIAL HYPERTENSION: ICD-10-CM

## 2021-03-08 DIAGNOSIS — F41.8 ANXIETY WITH DEPRESSION: ICD-10-CM

## 2021-03-08 DIAGNOSIS — F32.A DEPRESSION, UNSPECIFIED DEPRESSION TYPE: ICD-10-CM

## 2021-03-08 DIAGNOSIS — K59.00 CONSTIPATION, UNSPECIFIED CONSTIPATION TYPE: ICD-10-CM

## 2021-03-08 DIAGNOSIS — M54.50 CHRONIC LOW BACK PAIN WITHOUT SCIATICA, UNSPECIFIED BACK PAIN LATERALITY: ICD-10-CM

## 2021-03-08 DIAGNOSIS — E78.5 HYPERLIPIDEMIA, UNSPECIFIED HYPERLIPIDEMIA TYPE: ICD-10-CM

## 2021-03-08 DIAGNOSIS — K59.09 OTHER CONSTIPATION: ICD-10-CM

## 2021-03-08 DIAGNOSIS — F41.9 ANXIETY: ICD-10-CM

## 2021-03-08 DIAGNOSIS — E66.01 CLASS 3 SEVERE OBESITY DUE TO EXCESS CALORIES WITH SERIOUS COMORBIDITY AND BODY MASS INDEX (BMI) OF 40.0 TO 44.9 IN ADULT (HCC): ICD-10-CM

## 2021-03-08 DIAGNOSIS — E03.9 ACQUIRED HYPOTHYROIDISM: ICD-10-CM

## 2021-03-08 DIAGNOSIS — J44.9 CHRONIC OBSTRUCTIVE PULMONARY DISEASE, UNSPECIFIED COPD TYPE (HCC): ICD-10-CM

## 2021-03-08 DIAGNOSIS — J30.2 SEASONAL ALLERGIES: ICD-10-CM

## 2021-03-08 DIAGNOSIS — G47.00 INSOMNIA, UNSPECIFIED TYPE: ICD-10-CM

## 2021-03-08 DIAGNOSIS — K21.9 GASTROESOPHAGEAL REFLUX DISEASE WITHOUT ESOPHAGITIS: ICD-10-CM

## 2021-03-08 DIAGNOSIS — U07.1 COVID-19 VIRUS INFECTION: Primary | ICD-10-CM

## 2021-03-08 PROCEDURE — 99213 OFFICE O/P EST LOW 20 MIN: CPT | Performed by: NURSE PRACTITIONER

## 2021-03-08 RX ORDER — LISINOPRIL 30 MG/1
30 TABLET ORAL
Qty: 90 TABLET | Refills: 1 | Status: SHIPPED | OUTPATIENT
Start: 2021-03-08 | End: 2021-11-01

## 2021-03-08 RX ORDER — POLYETHYLENE GLYCOL 3350 17 G/17G
17 POWDER, FOR SOLUTION ORAL DAILY
Qty: 90 PACKET | Refills: 1 | Status: SHIPPED | OUTPATIENT
Start: 2021-03-08 | End: 2022-05-16 | Stop reason: SDUPTHER

## 2021-03-08 RX ORDER — MOMETASONE FUROATE 50 UG/1
2 SPRAY, METERED NASAL DAILY
Qty: 1 EACH | Refills: 12 | Status: SHIPPED | OUTPATIENT
Start: 2021-03-08 | End: 2021-03-15 | Stop reason: CLARIF

## 2021-03-08 RX ORDER — CETIRIZINE HYDROCHLORIDE 10 MG/1
10 TABLET ORAL DAILY
Qty: 90 TABLET | Refills: 1 | Status: SHIPPED | OUTPATIENT
Start: 2021-03-08 | End: 2021-09-01

## 2021-03-08 RX ORDER — LEVOTHYROXINE SODIUM 0.05 MG/1
50 TABLET ORAL DAILY
Qty: 90 TABLET | Refills: 1 | Status: SHIPPED | OUTPATIENT
Start: 2021-03-08 | End: 2021-07-09 | Stop reason: SDUPTHER

## 2021-03-08 RX ORDER — LANSOPRAZOLE 30 MG/1
30 CAPSULE, DELAYED RELEASE ORAL DAILY
Qty: 90 CAPSULE | Refills: 1 | Status: SHIPPED | OUTPATIENT
Start: 2021-03-08 | End: 2021-09-01

## 2021-03-08 RX ORDER — TRAZODONE HYDROCHLORIDE 50 MG/1
50 TABLET ORAL NIGHTLY
Qty: 90 TABLET | Refills: 1 | Status: SHIPPED | OUTPATIENT
Start: 2021-03-08 | End: 2021-06-16

## 2021-03-08 RX ORDER — GABAPENTIN 400 MG/1
400 CAPSULE ORAL 3 TIMES DAILY
Qty: 90 CAPSULE | Refills: 0 | Status: SHIPPED | OUTPATIENT
Start: 2021-03-08 | End: 2021-04-26 | Stop reason: SDUPTHER

## 2021-03-08 RX ORDER — BUPROPION HYDROCHLORIDE 300 MG/1
300 TABLET ORAL DAILY
Qty: 90 TABLET | Refills: 0 | Status: SHIPPED | OUTPATIENT
Start: 2021-03-08 | End: 2021-04-26

## 2021-03-11 ENCOUNTER — TELEPHONE (OUTPATIENT)
Dept: INTERNAL MEDICINE | Facility: CLINIC | Age: 56
End: 2021-03-11

## 2021-03-11 NOTE — TELEPHONE ENCOUNTER
LARRY BROWN (Key: [id number])  Your demographic data has been sent to the plan successfully. They will respond with your clinical questions and you will be notified by email when available within the next business day. You can also check for an update later by opening this request from your dashboard. Please do not fax or call the plan to resubmit this request.    If you need assistance, please chat with [employer]MyMeds or call us at [phone].

## 2021-03-11 NOTE — TELEPHONE ENCOUNTER
PHARMACY CALLED TO REPORTS THAT THE mometasone (Nasonex) 50 MCG/ACT nasal spray IS NOT COVERED ON PATIENTS INSURANCE AND THEY ARE ASKING FOR A PRIOR AUTHORIZATION OR A DIFFERENT MEDICATION PLEASE RETURN CALL TO [facility]/pharmacy #[id number] - [place], [place] - [address] [facility] AT [facility] - [phone] [facility] [phone] FX

## 2021-03-11 NOTE — TELEPHONE ENCOUNTER
Your information has been submitted to [facility]. To check for an updated outcome later, reopen this PA request from your dashboard.    If [facility] has not responded to your request within 24 hours, contact [facility] at [phone]. If you think there may be a problem with your PA request, use our live chat feature at the bottom right.

## 2021-03-15 RX ORDER — FLUTICASONE PROPIONATE 50 MCG
2 SPRAY, SUSPENSION (ML) NASAL DAILY
Qty: 16 G | Refills: 11 | Status: SHIPPED | OUTPATIENT
Start: 2021-03-15 | End: 2022-05-16 | Stop reason: SDUPTHER

## 2021-03-15 NOTE — TELEPHONE ENCOUNTER
Called pt's pharmacy and informed of med change. Voiced understanding. Stated med went through without any problems. Called pt and informed as well. Voiced understanding. Had no further questions/concerns at this time.

## 2021-03-15 NOTE — TELEPHONE ENCOUNTER
LARRY BROWN (Key: [id number])  This request has received a N/A outcome.    Please note any additional information provided by [employer] at the bottom of this request.  Is there an alternate option.

## 2021-03-16 ENCOUNTER — TREATMENT (OUTPATIENT)
Dept: PHYSICAL THERAPY | Facility: CLINIC | Age: 56
End: 2021-03-16

## 2021-03-16 DIAGNOSIS — G89.29 CHRONIC BILATERAL LOW BACK PAIN WITH RIGHT-SIDED SCIATICA: Primary | ICD-10-CM

## 2021-03-16 DIAGNOSIS — M54.41 CHRONIC BILATERAL LOW BACK PAIN WITH RIGHT-SIDED SCIATICA: Primary | ICD-10-CM

## 2021-03-16 PROCEDURE — 97162 PT EVAL MOD COMPLEX 30 MIN: CPT | Performed by: PHYSICAL THERAPIST

## 2021-03-16 NOTE — PROGRESS NOTES
Physical Therapy Initial Evaluation and Plan of Care      Patient: Adrianne Lowe   : 1965  Diagnosis/ICD-10 Code:  The encounter diagnosis was Chronic bilateral low back pain with right-sided sciatica.   Referring practitioner: ROMI Penny  Date of Initial Visit: Type: THERAPY  Noted: 3/16/2021  Today's Date: 3/16/2021  Patient seen for 1 sessions         Adrianne Lowe reports:  Low back pain and intermittent B LE pain  Subjective Questionnaire: Oswestry: 51%    Subjective Evaluation    History of Present Illness  Onset date: more than 1 year ago.  Mechanism of injury: Pt does not know what brought on the symptoms.  She says symptoms are worse since onset.    Subjective comment: low back pain and B LE pain (R LE to foot at times, L anterior LE)  Patient Occupation: Pt does not work.  She likes to be at home, cleaning, cooking. Pain  Current pain ratin  At best pain ratin (when laying down after taking medication)  At worst pain rating: 10 (sitting/standing)  Location: low back, R LE more pronounced today, sometimes L anterior LE pain  Relieving factors: rest, medications and ice (ice was helpful in clinic today, pt will apply at home)  Aggravating factors: ambulation, standing and lifting (sitting)  Progression: worsening    Social Support  Lives in: one-story house  Lives with: alone    Diagnostic Tests  No diagnostic tests performed  Abnormal x-ray: no recent imaging.    Treatments  No previous or current treatments  Patient Goals  Patient goals for therapy: decreased pain           Treatment            Functional Testing  Functional Tests Options: Modified Toño   Objective          Postural Observations  Seated posture: fair  Standing posture: fair    Additional Postural Observation Details  Flattened lumbar lordosis    Palpation     Additional Palpation Details  Generalized tenderness in lumbar region.    Neurological Testing     Sensation     Lumbar   Left    Intact: light touch    Right   Intact: light touch    Reflexes   Left   Patellar (L4): normal (2+)  Achilles (S1): absent (0)    Right   Patellar (L4): normal (2+)  Achilles (S1): absent (0)    Active Range of Motion     Additional Active Range of Motion Details  FIS: increases back and R LE pain  SAVITA: increases LBP  EIS: increases back and left buttock pain  Prone lying: no LE pain    Strength/Myotome Testing     Left Knee   Flexion: 4+  Extension: 5    Right Knee   Flexion: 4+  Extension: 5    Left Ankle/Foot   Dorsiflexion: 5  Great toe extension: 5    Right Ankle/Foot   Dorsiflexion: 5  Great toe extension: 5    Tests     Additional Tests Details  Slump and SLR:  Pt notices calf pain, but difficult to say if it is stretching vs radicular pain initially.          Assessment & Plan     Assessment  Impairments: abnormal or restricted ROM, activity intolerance and lacks appropriate home exercise program  Assessment details: Pt presents with chronic LBP and now reports B LE pain intermittently as well.  Symptoms improved in clinic today with prone lying and prone on elbows, as well as with ice application to reduce general irritability.  Pt will benefit from PT intervention to address pain and noted deficits in mobility and functional activities.  Prognosis: good  Functional Limitations: carrying objects, lifting, sleeping, walking, pulling, pushing, uncomfortable because of pain, sitting, standing and stooping  Goals  Plan Goals: 4 weeks  Pt. demonstrates independence and compliance with initial HEP.  Pt. reports reduction in pain intensity to no worse than 5/10 on NPRS.  AROM of lumbar spine shows improvement over baseline measures without increase in pain.  Functional outcome measure is improved by 10% or more.    8 weeks  Pt. demonstrates independence in advanced HEP for ongoing improvement.  AROM of lumbar spine is sufficient for performance of daily household activities.  Pt demonstrates knowledge of correct  posture and body mechanics.  Functional outcome measure is improved to </= 30%.          Plan  Therapy options: will be seen for skilled physical therapy services  Planned modality interventions: cryotherapy  Planned therapy interventions: abdominal trunk stabilization, manual therapy, body mechanics training, neuromuscular re-education, postural training, functional ROM exercises, home exercise program, joint mobilization, therapeutic activities, stretching, strengthening and spinal/joint mobilization  Frequency: 1x week  Duration in visits: 8  Treatment plan discussed with: patient  Plan details: PT weekly per POC, addressing pain, posture, body mechanics,and functional mobility deficits.        Timed:  Manual Therapy:         mins  58443;  Therapeutic Exercise:         mins  16870;     Neuromuscular Diamond:        mins  42701;    Therapeutic Activity:          mins  43326;     Gait Training:           mins  50791;     Ultrasound:          mins  56331;    Electrical Stimulation:         mins  40724 ( );    Untimed:  Electrical Stimulation:         mins  75289 ( );  Mechanical Traction:         mins  29839;     Timed Treatment:      mins   Total Treatment:     40   mins    PT SIGNATURE: Ruthie Howard, PT   DATE TREATMENT INITIATED: 3/16/2021    Initial Certification  Certification Period: 6/14/2021  I certify that the therapy services are furnished while this patient is under my care.  The services outlined above are required by this patient, and will be reviewed every 90 days.     PHYSICIAN: Jamee Leo, APRN      DATE:     Please sign and return via fax to [phone].. Thank you, [facility].

## 2021-03-17 DIAGNOSIS — M54.50 CHRONIC LOW BACK PAIN WITHOUT SCIATICA, UNSPECIFIED BACK PAIN LATERALITY: ICD-10-CM

## 2021-03-17 DIAGNOSIS — G89.29 CHRONIC LOW BACK PAIN WITHOUT SCIATICA, UNSPECIFIED BACK PAIN LATERALITY: ICD-10-CM

## 2021-03-17 RX ORDER — MELOXICAM 7.5 MG/1
TABLET ORAL
Qty: 30 TABLET | Refills: 0 | OUTPATIENT
Start: 2021-03-17 | End: 2021-04-02

## 2021-03-23 ENCOUNTER — TREATMENT (OUTPATIENT)
Dept: PHYSICAL THERAPY | Facility: CLINIC | Age: 56
End: 2021-03-23

## 2021-03-23 DIAGNOSIS — G89.29 CHRONIC BILATERAL LOW BACK PAIN WITH RIGHT-SIDED SCIATICA: Primary | ICD-10-CM

## 2021-03-23 DIAGNOSIS — M54.41 CHRONIC BILATERAL LOW BACK PAIN WITH RIGHT-SIDED SCIATICA: Primary | ICD-10-CM

## 2021-03-23 PROCEDURE — 97530 THERAPEUTIC ACTIVITIES: CPT | Performed by: PHYSICAL THERAPIST

## 2021-03-23 PROCEDURE — 97110 THERAPEUTIC EXERCISES: CPT | Performed by: PHYSICAL THERAPIST

## 2021-03-23 NOTE — PROGRESS NOTES
Physical Therapy Daily Progress Note  VISIT: 2          Subjective    Adrianne Lowe reports: moderate to severe low back pain and B leg pain.      Pre-treatment pain:  6 (back and legs)  Post-treatment pain:  4 (low back)      Objective    Treatment    Exercise 1  Exercise Name 1: treadmill walk  Equipment/Resistance 1: 1 mph  Time: 2 minutes  Exercise 2  Exercise Name 2: wall slide shallow squats  Sets/Reps 2: 10x  Exercise 3  Exercise Name 3: heel raises facing wall  Sets/Reps 3: 20x  Exercise 4  Exercise Name 4: prone on elbows  Exercise 5  Exercise Name 5: prone press ups  Sets/Reps 5: 2x10  Exercise 6  Exercise Name 6: standing lumbar extension at countertop  Exercise 7  Exercise Name 7: reinforcement of posture  Exercise 8  Exercise Name 8: encouraged gradually increasing walking tolerance, reducing time in sitting                 Assessment & Plan     Assessment  Assessment details: Pt became quickly short of breath on treadmill.  She as very low activity tolerance.  Back and leg pain respond well to press ups with centralization to low back and reduction in intensity of pain.               Timed:  Manual Therapy:         mins  48066;  Therapeutic Exercise:    20     mins  19035;     Neuromuscular Diamond:    10    mins  51540;    Therapeutic Activity:          mins  24235;     Gait Training:           mins  65633;     Ultrasound:          mins  13805;    Electrical Stimulation:         mins  93617 ( );    Untimed:  Electrical Stimulation:         mins  46790 ( );  Mechanical Traction:         mins  65972;     Timed Treatment:   30   mins   Total Treatment:     30   mins      Ruthie Howard, PT  Physical Therapist

## 2021-03-30 ENCOUNTER — TREATMENT (OUTPATIENT)
Dept: PHYSICAL THERAPY | Facility: CLINIC | Age: 56
End: 2021-03-30

## 2021-03-30 DIAGNOSIS — M54.41 CHRONIC BILATERAL LOW BACK PAIN WITH RIGHT-SIDED SCIATICA: Primary | ICD-10-CM

## 2021-03-30 DIAGNOSIS — G89.29 CHRONIC BILATERAL LOW BACK PAIN WITH RIGHT-SIDED SCIATICA: Primary | ICD-10-CM

## 2021-03-30 PROCEDURE — 97110 THERAPEUTIC EXERCISES: CPT | Performed by: PHYSICAL THERAPIST

## 2021-03-30 PROCEDURE — 97530 THERAPEUTIC ACTIVITIES: CPT | Performed by: PHYSICAL THERAPIST

## 2021-03-30 NOTE — PROGRESS NOTES
"   Physical Therapy Daily Progress Note  VISIT: 3          Subjective    Adrianne Lowe reports: her exercises seem to help when she does them.  She has been running errands today and her legs feel tired.      Pre-treatment pain:  5  Post-treatment pain:  0      Objective    Treatment    Exercise 1  Exercise Name 1: treadmill walk  Equipment/Resistance 1: 1 mph  Time: 3'30\"  Exercise 2  Exercise Name 2: incline board calf stretch  Sets/Reps 2: 2x30\"  Exercise 3  Exercise Name 3: heel raises at countertop  Exercise 4  Exercise Name 4: education regarding sleeping posture  Exercise 5  Exercise Name 5: education in construction of night roll for home use  Exercise 6  Exercise Name 6: practice in use of night roll (pt noted decrease in pain)  Exercise 7  Exercise Name 7: advice regarding frequent changes of position and use of back support when sitting                 Assessment & Plan     Assessment  Assessment details: Pt responded well to trial use of night roll in clinic today.  She notices elimination of initially reported symptoms during performance of prone extension exercises.    Plan  Plan details: Continue exercise and activity progression as tolerated and as symptoms indicate.               Timed:  Manual Therapy:         mins  53234;  Therapeutic Exercise:    25     mins  43883;     Neuromuscular Diamond:        mins  41451;    Therapeutic Activity:     15     mins  21668;     Gait Training:           mins  32665;     Ultrasound:          mins  94096;    Electrical Stimulation:         mins  33164 ( );    Untimed:  Electrical Stimulation:         mins  02591 ( );  Mechanical Traction:         mins  74063;     Timed Treatment:   40   mins   Total Treatment:     40   mins      Ruthie Howard, PT  Physical Therapist                    "

## 2021-03-31 DIAGNOSIS — I10 ESSENTIAL HYPERTENSION: ICD-10-CM

## 2021-04-02 ENCOUNTER — TELEPHONE (OUTPATIENT)
Dept: INTERNAL MEDICINE | Facility: CLINIC | Age: 56
End: 2021-04-02

## 2021-04-02 DIAGNOSIS — E78.5 HYPERLIPIDEMIA, UNSPECIFIED HYPERLIPIDEMIA TYPE: ICD-10-CM

## 2021-04-02 RX ORDER — LISINOPRIL 30 MG/1
TABLET ORAL
Qty: 30 TABLET | Refills: 4 | OUTPATIENT
Start: 2021-04-02

## 2021-04-02 NOTE — TELEPHONE ENCOUNTER
Caller: Adrianne Lowe    Relationship: Self    Best call back number: [phone]     What medication are you requesting:     What are your current symptoms: EAR PAIN    How long have you been experiencing symptoms: A FEW DAYS     Have you had these symptoms before:    [x] Yes  [] No    Have you been treated for these symptoms before:   [x] Yes  [] No    If a prescription is needed, what is your preferred pharmacy and phone number:  [id number] - [place], [place] - [address] [place] [address] - [phone] [facility] [phone]     Additional notes:

## 2021-04-02 NOTE — TELEPHONE ENCOUNTER
Called and s/w patient informed her of the information below. She didn't want to wait until the first of the week so she is just going to go to [facility] she was appreciative for the phone call back and gave verbal understanding.

## 2021-04-02 NOTE — TELEPHONE ENCOUNTER
Please advise pt that she needs to be seen in office. Would recommend [facility] as we are going into the weekend, or schedule with one of us early next week.

## 2021-04-03 RX ORDER — SIMVASTATIN 20 MG
TABLET ORAL
Qty: 30 TABLET | Refills: 2 | Status: SHIPPED | OUTPATIENT
Start: 2021-04-03 | End: 2021-07-02

## 2021-04-05 ENCOUNTER — TELEPHONE (OUTPATIENT)
Dept: INTERNAL MEDICINE | Facility: CLINIC | Age: 56
End: 2021-04-05

## 2021-04-05 NOTE — TELEPHONE ENCOUNTER
S/w patient informed her that she needed to come into the office and complete her blood work that Jamee Leo had ordered.  She stated will try to come in tomorrow.

## 2021-04-06 ENCOUNTER — LAB (OUTPATIENT)
Dept: INTERNAL MEDICINE | Facility: CLINIC | Age: 56
End: 2021-04-06

## 2021-04-06 ENCOUNTER — TREATMENT (OUTPATIENT)
Dept: PHYSICAL THERAPY | Facility: CLINIC | Age: 56
End: 2021-04-06

## 2021-04-06 DIAGNOSIS — M54.41 CHRONIC BILATERAL LOW BACK PAIN WITH RIGHT-SIDED SCIATICA: Primary | ICD-10-CM

## 2021-04-06 DIAGNOSIS — E03.9 ACQUIRED HYPOTHYROIDISM: ICD-10-CM

## 2021-04-06 DIAGNOSIS — I10 ESSENTIAL HYPERTENSION: ICD-10-CM

## 2021-04-06 DIAGNOSIS — K21.9 GASTROESOPHAGEAL REFLUX DISEASE WITHOUT ESOPHAGITIS: ICD-10-CM

## 2021-04-06 DIAGNOSIS — J30.2 SEASONAL ALLERGIES: ICD-10-CM

## 2021-04-06 DIAGNOSIS — E78.5 HYPERLIPIDEMIA, UNSPECIFIED HYPERLIPIDEMIA TYPE: ICD-10-CM

## 2021-04-06 DIAGNOSIS — G89.29 CHRONIC BILATERAL LOW BACK PAIN WITH RIGHT-SIDED SCIATICA: Primary | ICD-10-CM

## 2021-04-06 LAB
ALBUMIN SERPL-MCNC: 4.4 G/DL (ref 3.5–5.2)
ALBUMIN/GLOB SERPL: 1.4 G/DL
ALP SERPL-CCNC: 93 U/L (ref 39–117)
ALT SERPL W P-5'-P-CCNC: 45 U/L (ref 1–33)
ANION GAP SERPL CALCULATED.3IONS-SCNC: 9.6 MMOL/L (ref 5–15)
AST SERPL-CCNC: 31 U/L (ref 1–32)
BASOPHILS # BLD AUTO: 0.05 10*3/MM3 (ref 0–0.2)
BASOPHILS NFR BLD AUTO: 0.6 % (ref 0–1.5)
BILIRUB BLD-MCNC: NEGATIVE MG/DL
BILIRUB SERPL-MCNC: 0.2 MG/DL (ref 0–1.2)
BUN SERPL-MCNC: 9 MG/DL (ref 6–20)
BUN/CREAT SERPL: 15.3 (ref 7–25)
CALCIUM SPEC-SCNC: 9.5 MG/DL (ref 8.6–10.5)
CHLORIDE SERPL-SCNC: 102 MMOL/L (ref 98–107)
CHOLEST SERPL-MCNC: 146 MG/DL (ref 0–200)
CLARITY, POC: CLEAR
CO2 SERPL-SCNC: 29.4 MMOL/L (ref 22–29)
COLOR UR: YELLOW
CREAT SERPL-MCNC: 0.59 MG/DL (ref 0.57–1)
DEPRECATED RDW RBC AUTO: 42 FL (ref 37–54)
EOSINOPHIL # BLD AUTO: 0.19 10*3/MM3 (ref 0–0.4)
EOSINOPHIL NFR BLD AUTO: 2.1 % (ref 0.3–6.2)
ERYTHROCYTE [DISTWIDTH] IN BLOOD BY AUTOMATED COUNT: 14.5 % (ref 12.3–15.4)
EXPIRATION DATE: NORMAL
GFR SERPL CREATININE-BSD FRML MDRD: 105 ML/MIN/1.73
GLOBULIN UR ELPH-MCNC: 3.2 GM/DL
GLUCOSE SERPL-MCNC: 98 MG/DL (ref 65–99)
GLUCOSE UR STRIP-MCNC: NEGATIVE MG/DL
HCT VFR BLD AUTO: 40.2 % (ref 34–46.6)
HDLC SERPL-MCNC: 42 MG/DL (ref 40–60)
HGB BLD-MCNC: 13.5 G/DL (ref 12–15.9)
KETONES UR QL: NEGATIVE
LDLC SERPL CALC-MCNC: 80 MG/DL (ref 0–100)
LDLC/HDLC SERPL: 1.82 {RATIO}
LEUKOCYTE EST, POC: NEGATIVE
LYMPHOCYTES # BLD AUTO: 2.93 10*3/MM3 (ref 0.7–3.1)
LYMPHOCYTES NFR BLD AUTO: 33.1 % (ref 19.6–45.3)
Lab: NORMAL
MCH RBC QN AUTO: 26.9 PG (ref 26.6–33)
MCHC RBC AUTO-ENTMCNC: 33.6 G/DL (ref 31.5–35.7)
MCV RBC AUTO: 80.1 FL (ref 79–97)
MONOCYTES # BLD AUTO: 0.57 10*3/MM3 (ref 0.1–0.9)
MONOCYTES NFR BLD AUTO: 6.4 % (ref 5–12)
NEUTROPHILS NFR BLD AUTO: 5.04 10*3/MM3 (ref 1.7–7)
NEUTROPHILS NFR BLD AUTO: 57 % (ref 42.7–76)
NITRITE UR-MCNC: NEGATIVE MG/ML
PH UR: 5 [PH] (ref 5–8)
PLATELET # BLD AUTO: 196 10*3/MM3 (ref 140–450)
PMV BLD AUTO: 14 FL (ref 6–12)
POTASSIUM SERPL-SCNC: 4.4 MMOL/L (ref 3.5–5.2)
PROT SERPL-MCNC: 7.6 G/DL (ref 6–8.5)
PROT UR STRIP-MCNC: NEGATIVE MG/DL
RBC # BLD AUTO: 5.02 10*6/MM3 (ref 3.77–5.28)
RBC # UR STRIP: NEGATIVE /UL
SODIUM SERPL-SCNC: 141 MMOL/L (ref 136–145)
SP GR UR: 1.01 (ref 1–1.03)
TRIGL SERPL-MCNC: 137 MG/DL (ref 0–150)
TSH SERPL DL<=0.05 MIU/L-ACNC: 3.39 UIU/ML (ref 0.27–4.2)
UROBILINOGEN UR QL: NORMAL
VLDLC SERPL-MCNC: 24 MG/DL (ref 5–40)
WBC # BLD AUTO: 8.85 10*3/MM3 (ref 3.4–10.8)

## 2021-04-06 PROCEDURE — 80061 LIPID PANEL: CPT | Performed by: NURSE PRACTITIONER

## 2021-04-06 PROCEDURE — 85025 COMPLETE CBC W/AUTO DIFF WBC: CPT | Performed by: NURSE PRACTITIONER

## 2021-04-06 PROCEDURE — 80053 COMPREHEN METABOLIC PANEL: CPT | Performed by: NURSE PRACTITIONER

## 2021-04-06 PROCEDURE — 97110 THERAPEUTIC EXERCISES: CPT | Performed by: PHYSICAL THERAPIST

## 2021-04-06 PROCEDURE — 84443 ASSAY THYROID STIM HORMONE: CPT | Performed by: NURSE PRACTITIONER

## 2021-04-06 PROCEDURE — 36415 COLL VENOUS BLD VENIPUNCTURE: CPT | Performed by: NURSE PRACTITIONER

## 2021-04-06 PROCEDURE — 81003 URINALYSIS AUTO W/O SCOPE: CPT | Performed by: NURSE PRACTITIONER

## 2021-04-06 NOTE — PROGRESS NOTES
Physical Therapy Daily Progress Note  VISIT: 4          Subjective    Adrianne Lowe reports: she is feeling better (but she rates her pain higher than at last session).      Pre-treatment pain:  7  Post-treatment pain:  5      Objective    Treatment    Exercise 1  Exercise Name 1: treadmill walk  Time: 5'  Exercise 2  Exercise Name 2: prone lying  Exercise 3  Exercise Name 3: prone press ups  Exercise 4  Exercise Name 4: seated forward bend after treadmill walk                 Assessment & Plan     Assessment  Assessment details: Pt has difficulty discerning what makes her pain better or worse.  She seemed to feel better after walking in clinic today and with seated forward bend.  She is gradually increasing walking tolerance in clinic, and she is encouraged to try to walk for at least a few minutes each day.    Plan  Plan details: Continue PT.  Continue to assess symptom response to treatment/exercise/movement.               Timed:  Manual Therapy:         mins  33493;  Therapeutic Exercise:    30     mins  44064;     Neuromuscular Diamond:        mins  44000;    Therapeutic Activity:          mins  18859;     Gait Training:           mins  72500;     Ultrasound:          mins  01488;    Electrical Stimulation:         mins  05257 ( );    Untimed:  Electrical Stimulation:         mins  17485 ( );  Mechanical Traction:         mins  65639;     Timed Treatment:   30   mins   Total Treatment:     30   mins      Ruthie Howard PT  Physical Therapist

## 2021-04-10 DIAGNOSIS — M54.50 CHRONIC LOW BACK PAIN WITHOUT SCIATICA, UNSPECIFIED BACK PAIN LATERALITY: ICD-10-CM

## 2021-04-10 DIAGNOSIS — G89.29 CHRONIC LOW BACK PAIN WITHOUT SCIATICA, UNSPECIFIED BACK PAIN LATERALITY: ICD-10-CM

## 2021-04-12 RX ORDER — GABAPENTIN 400 MG/1
CAPSULE ORAL
Qty: 90 CAPSULE | Refills: 2 | OUTPATIENT
Start: 2021-04-12

## 2021-04-12 NOTE — TELEPHONE ENCOUNTER
Please check with patient to see what pain she is having in regards to taking her gabapentin?  Also reviewed with patient and she completed the physical therapy that I ordered at her last visit.  Please ask her if there is a reason we have not gotten her into pain management.  It looks as though I ordered it but she did not keep appointment.  She is on high enough dose of gabapentin that I would like for her to be followed by pain management to continue refills.  Please see if she is agreeable.

## 2021-04-13 ENCOUNTER — TELEPHONE (OUTPATIENT)
Dept: INTERNAL MEDICINE | Facility: CLINIC | Age: 56
End: 2021-04-13

## 2021-04-13 ENCOUNTER — TREATMENT (OUTPATIENT)
Dept: PHYSICAL THERAPY | Facility: CLINIC | Age: 56
End: 2021-04-13

## 2021-04-13 DIAGNOSIS — G89.29 CHRONIC BILATERAL LOW BACK PAIN WITH RIGHT-SIDED SCIATICA: Primary | ICD-10-CM

## 2021-04-13 DIAGNOSIS — M25.561 CHRONIC PAIN OF RIGHT KNEE: ICD-10-CM

## 2021-04-13 DIAGNOSIS — G89.29 CHRONIC PAIN OF RIGHT KNEE: ICD-10-CM

## 2021-04-13 DIAGNOSIS — M54.41 CHRONIC BILATERAL LOW BACK PAIN WITH RIGHT-SIDED SCIATICA: Primary | ICD-10-CM

## 2021-04-13 PROCEDURE — 97110 THERAPEUTIC EXERCISES: CPT | Performed by: PHYSICAL THERAPIST

## 2021-04-13 NOTE — PROGRESS NOTES
Physical Therapy Daily Progress Note  VISIT: 5          Subjective    Adrianne Lowe reports: B knee and low back pain upon arrival today.  She has not been able to walk as regularly as she would like since her last PT visit.      Pre-treatment pain:  9  Post-treatment pain:  6      Objective    Treatment    Exercise 1  Exercise Name 1: Nu-Step  Equipment/Resistance 1: level 3  Time: 8'  Exercise 2  Exercise Name 2: seated knee flexion  Equipment/Resistance 2: green band  Exercise 3  Exercise Name 3: LAQ  Exercise 4  Exercise Name 4: seated forward bend  Exercise 5  Exercise Name 5: side stepping at countertop  Exercise 6  Exercise Name 6: standing hamstring curls  Exercise 7  Exercise Name 7: marching  Exercise 8  Exercise Name 8: heel raises  Exercise 9  Exercise Name 9: additions to HEP after practice in clinic today                 Assessment & Plan     Assessment  Assessment details: Pt reported decreasing pain after basic mobility exercises in clinic today.      Plan  Plan details: Continue PT.  Progress exercise as tolerated to decrease pain and increase tolerance for daily activities.               Timed:  Manual Therapy:         mins  41789;  Therapeutic Exercise:    30     mins  07568;     Neuromuscular Diamond:        mins  96380;    Therapeutic Activity:          mins  95833;     Gait Training:           mins  59358;     Ultrasound:          mins  24336;    Electrical Stimulation:         mins  51052 ( );    Untimed:  Electrical Stimulation:         mins  13178 ( );  Mechanical Traction:         mins  08230;     Timed Treatment:   30   mins   Total Treatment:     30   mins      Ruthie Howard, PT  Physical Therapist

## 2021-04-13 NOTE — TELEPHONE ENCOUNTER
Left voice mail message for Ms. Silver requesting call to office for message from PCP. Office number given.    Hub okay to relay message  Please call patient to see when her last dose of gabapentin was taken.  Also please asked patient to reschedule her appointment no further refills of medications can be given to the patient is seen in office.  I see she did come get her blood work but did not keep her office visit I have not seen patient since August.

## 2021-04-13 NOTE — TELEPHONE ENCOUNTER
----- Message from ROMI Penny sent at 4/13/2021 12:33 PM EDT -----  Please call patient to see when her last dose of gabapentin was taken.  Also please asked patient to reschedule her appointment no further refills of medications can be given to the patient is seen in office.  I see she did come get her blood work but did not keep her office visit I have not seen patient since August.

## 2021-04-15 ENCOUNTER — TELEPHONE (OUTPATIENT)
Dept: INTERNAL MEDICINE | Facility: CLINIC | Age: 56
End: 2021-04-15

## 2021-04-15 NOTE — TELEPHONE ENCOUNTER
Left voice mail message requesting Pt to return call for message from PCP. Office number given.    Hub okay to relay message  To Pt.    Please call patient to see when her last dose of gabapentin was taken.  Also please asked patient to reschedule her appointment no further refills of medications can be given to the patient is seen in office.  I see she did come get her blood work but did not keep her office visit I have not seen patient since August.

## 2021-04-20 ENCOUNTER — TREATMENT (OUTPATIENT)
Dept: PHYSICAL THERAPY | Facility: CLINIC | Age: 56
End: 2021-04-20

## 2021-04-20 DIAGNOSIS — M54.50 CHRONIC LOW BACK PAIN WITHOUT SCIATICA, UNSPECIFIED BACK PAIN LATERALITY: ICD-10-CM

## 2021-04-20 DIAGNOSIS — G89.29 CHRONIC LOW BACK PAIN WITHOUT SCIATICA, UNSPECIFIED BACK PAIN LATERALITY: ICD-10-CM

## 2021-04-20 DIAGNOSIS — G89.29 CHRONIC BILATERAL LOW BACK PAIN WITH RIGHT-SIDED SCIATICA: Primary | ICD-10-CM

## 2021-04-20 DIAGNOSIS — M25.561 CHRONIC PAIN OF RIGHT KNEE: ICD-10-CM

## 2021-04-20 DIAGNOSIS — M54.41 CHRONIC BILATERAL LOW BACK PAIN WITH RIGHT-SIDED SCIATICA: Primary | ICD-10-CM

## 2021-04-20 DIAGNOSIS — G89.29 CHRONIC PAIN OF RIGHT KNEE: ICD-10-CM

## 2021-04-20 PROCEDURE — 97140 MANUAL THERAPY 1/> REGIONS: CPT | Performed by: PHYSICAL THERAPIST

## 2021-04-20 PROCEDURE — 97110 THERAPEUTIC EXERCISES: CPT | Performed by: PHYSICAL THERAPIST

## 2021-04-20 NOTE — PROGRESS NOTES
Physical Therapy Daily Progress Note  VISIT: 6          Subjective    Adrianne Lowe reports: her back and her legs are hurting.      Pre-treatment pain:  7  Post-treatment pain:  0      Objective    Treatment    Exercise 1  Exercise Name 1: Reassessment completed  Exercise 2  Exercise Name 2: flexion in standing-increases pain  Exercise 3  Exercise Name 3: flexion in sitting-increases pain  Exercise 4  Exercise Name 4: prone lying-pain decreasing  Exercise 5  Exercise Name 5: prone press ups-pain decreasing and centralizing  Exercise 6  Exercise Name 6: prone press ups with PT overpressure-pain continues to decrease anad centralize       Plan Goals: 4 weeks  Pt. demonstrates independence and compliance with initial HEP-partially met.  Pt. reports reduction in pain intensity to no worse than 5/10 on NPRS-ongoing.  AROM of lumbar spine shows improvement over baseline measures without increase in pain-ongoing.  Functional outcome measure is improved by 10% or more-not met, ongoing.    8 weeks  Pt. demonstrates independence in advanced HEP for ongoing improvement-ongoing.  AROM of lumbar spine is sufficient for performance of daily household activities-ongoing.  Pt demonstrates knowledge of correct posture and body mechanics-ongoing.  Functional outcome measure is improved to </= 30%-not met, ongoing.              Assessment & Plan     Assessment  Assessment details: Multiple repetitions and overpressure required, but pt responded well to extension exercises in clinic today.  She was surprised and pleased when she returned to standing and did not have pain.    Plan  Plan details: Continue PT.  Pt is encouraged to continue with exercises which were helpful in clinic today.  She should avoid prolonged sitting, and should be attentive to posture when sitting.               Timed:  Manual Therapy:    8     mins  46681;  Therapeutic Exercise:    30     mins  35664;     Neuromuscular Diamond:        mins  09722;     Therapeutic Activity:          mins  16850;     Gait Training:           mins  87745;     Ultrasound:          mins  24816;    Electrical Stimulation:         mins  85722 ( );    Untimed:  Electrical Stimulation:         mins  95721 ( );  Mechanical Traction:         mins  41940;     Timed Treatment:   38   mins   Total Treatment:     38   mins      Ruthie Howard PT  Physical Therapist

## 2021-04-21 RX ORDER — MELOXICAM 7.5 MG/1
TABLET ORAL
Qty: 30 TABLET | Refills: 5 | Status: SHIPPED | OUTPATIENT
Start: 2021-04-21 | End: 2021-11-05

## 2021-04-26 ENCOUNTER — TELEPHONE (OUTPATIENT)
Dept: INTERNAL MEDICINE | Facility: CLINIC | Age: 56
End: 2021-04-26

## 2021-04-26 ENCOUNTER — OFFICE VISIT (OUTPATIENT)
Dept: INTERNAL MEDICINE | Facility: CLINIC | Age: 56
End: 2021-04-26

## 2021-04-26 VITALS
HEART RATE: 91 BPM | BODY MASS INDEX: 45.47 KG/M2 | WEIGHT: 248.6 LBS | OXYGEN SATURATION: 92 % | TEMPERATURE: 98.7 F | DIASTOLIC BLOOD PRESSURE: 78 MMHG | RESPIRATION RATE: 18 BRPM | SYSTOLIC BLOOD PRESSURE: 124 MMHG

## 2021-04-26 DIAGNOSIS — G89.29 CHRONIC LOW BACK PAIN WITHOUT SCIATICA, UNSPECIFIED BACK PAIN LATERALITY: ICD-10-CM

## 2021-04-26 DIAGNOSIS — H40.1132 PRIMARY OPEN ANGLE GLAUCOMA (POAG) OF BOTH EYES, MODERATE STAGE: ICD-10-CM

## 2021-04-26 DIAGNOSIS — K21.9 GASTROESOPHAGEAL REFLUX DISEASE WITHOUT ESOPHAGITIS: ICD-10-CM

## 2021-04-26 DIAGNOSIS — J44.9 CHRONIC OBSTRUCTIVE PULMONARY DISEASE, UNSPECIFIED COPD TYPE (HCC): ICD-10-CM

## 2021-04-26 DIAGNOSIS — M54.50 CHRONIC LOW BACK PAIN WITHOUT SCIATICA, UNSPECIFIED BACK PAIN LATERALITY: ICD-10-CM

## 2021-04-26 DIAGNOSIS — E78.5 HYPERLIPIDEMIA, UNSPECIFIED HYPERLIPIDEMIA TYPE: ICD-10-CM

## 2021-04-26 DIAGNOSIS — F41.8 ANXIETY WITH DEPRESSION: ICD-10-CM

## 2021-04-26 DIAGNOSIS — K59.09 OTHER CONSTIPATION: ICD-10-CM

## 2021-04-26 DIAGNOSIS — E66.01 CLASS 3 SEVERE OBESITY DUE TO EXCESS CALORIES WITH SERIOUS COMORBIDITY AND BODY MASS INDEX (BMI) OF 40.0 TO 44.9 IN ADULT (HCC): ICD-10-CM

## 2021-04-26 DIAGNOSIS — I10 ESSENTIAL HYPERTENSION: ICD-10-CM

## 2021-04-26 DIAGNOSIS — J30.2 SEASONAL ALLERGIES: Primary | ICD-10-CM

## 2021-04-26 DIAGNOSIS — G47.09 OTHER INSOMNIA: ICD-10-CM

## 2021-04-26 PROCEDURE — 99214 OFFICE O/P EST MOD 30 MIN: CPT | Performed by: NURSE PRACTITIONER

## 2021-04-26 RX ORDER — GABAPENTIN 400 MG/1
400 CAPSULE ORAL 3 TIMES DAILY
Qty: 90 CAPSULE | Refills: 1 | Status: SHIPPED | OUTPATIENT
Start: 2021-04-26 | End: 2021-09-16

## 2021-04-26 NOTE — TELEPHONE ENCOUNTER
Refill medication 1 month.  Please let patient know she will need to be seen in office for evaluation prior to further medication refills in 1 month.  If she cannot keep appointment then she will not be able to have further refills.

## 2021-04-26 NOTE — TELEPHONE ENCOUNTER
Spoke to Ms. Lowe. Pt stated she may have taken one yesterday, not sure. Pt stated that was the last of the pill. Pt does not remember when before she had taken and added she has no more.  Pt is scheduled to see PCP 04/26/21.

## 2021-05-14 DIAGNOSIS — F41.9 ANXIETY: ICD-10-CM

## 2021-05-14 DIAGNOSIS — F32.A DEPRESSION, UNSPECIFIED DEPRESSION TYPE: ICD-10-CM

## 2021-05-14 RX ORDER — BUPROPION HYDROCHLORIDE 300 MG/1
TABLET ORAL
Qty: 30 TABLET | Refills: 0 | Status: SHIPPED | OUTPATIENT
Start: 2021-05-14 | End: 2021-08-02

## 2021-05-19 ENCOUNTER — DOCUMENTATION (OUTPATIENT)
Dept: PHYSICAL THERAPY | Facility: CLINIC | Age: 56
End: 2021-05-19

## 2021-05-19 NOTE — PROGRESS NOTES
Discharge Summary  Discharge Summary from Physical Therapy Report      Patient: Adrianne Lowe   : 1965  Diagnosis/ICD-10 Code:  There were no encounter diagnoses.   Referring practitioner: No ref. provider found  Date of Initial Visit: No linked episodes  Today's Date: 2021  Date of Last Visit: 2021     Number of Visits: 6    Discharge Status of Patient: Pt did not show for last scheduled PT appointment and has not called to reschedule.    Goals: Partially Met    Discharge Plan: MD referred pt to pain management.    Comments:  Did not complete PT treatment.    Date of Discharge: 2021        Ruthie Howard, PT

## 2021-05-20 ENCOUNTER — DOCUMENTATION (OUTPATIENT)
Dept: INTERNAL MEDICINE | Facility: CLINIC | Age: 56
End: 2021-05-20

## 2021-06-16 DIAGNOSIS — G47.00 INSOMNIA, UNSPECIFIED TYPE: ICD-10-CM

## 2021-06-16 RX ORDER — TRAZODONE HYDROCHLORIDE 50 MG/1
TABLET ORAL
Qty: 30 TABLET | Refills: 1 | Status: SHIPPED | OUTPATIENT
Start: 2021-06-16 | End: 2021-10-20

## 2021-07-01 DIAGNOSIS — E78.5 HYPERLIPIDEMIA, UNSPECIFIED HYPERLIPIDEMIA TYPE: ICD-10-CM

## 2021-07-02 RX ORDER — SIMVASTATIN 20 MG
TABLET ORAL
Qty: 30 TABLET | Refills: 2 | Status: SHIPPED | OUTPATIENT
Start: 2021-07-02 | End: 2021-09-22

## 2021-07-05 DIAGNOSIS — G89.29 CHRONIC LOW BACK PAIN WITHOUT SCIATICA, UNSPECIFIED BACK PAIN LATERALITY: ICD-10-CM

## 2021-07-05 DIAGNOSIS — M54.50 CHRONIC LOW BACK PAIN WITHOUT SCIATICA, UNSPECIFIED BACK PAIN LATERALITY: ICD-10-CM

## 2021-07-08 ENCOUNTER — OFFICE VISIT (OUTPATIENT)
Dept: INTERNAL MEDICINE | Facility: CLINIC | Age: 56
End: 2021-07-08

## 2021-07-08 VITALS
HEART RATE: 69 BPM | BODY MASS INDEX: 44.45 KG/M2 | SYSTOLIC BLOOD PRESSURE: 128 MMHG | TEMPERATURE: 97.3 F | OXYGEN SATURATION: 98 % | DIASTOLIC BLOOD PRESSURE: 82 MMHG | RESPIRATION RATE: 18 BRPM | WEIGHT: 243 LBS

## 2021-07-08 DIAGNOSIS — J30.2 SEASONAL ALLERGIES: ICD-10-CM

## 2021-07-08 DIAGNOSIS — E66.01 CLASS 3 SEVERE OBESITY DUE TO EXCESS CALORIES WITH SERIOUS COMORBIDITY AND BODY MASS INDEX (BMI) OF 40.0 TO 44.9 IN ADULT (HCC): ICD-10-CM

## 2021-07-08 DIAGNOSIS — Z79.899 HIGH RISK MEDICATION USE: ICD-10-CM

## 2021-07-08 DIAGNOSIS — F41.9 ANXIETY: ICD-10-CM

## 2021-07-08 DIAGNOSIS — K21.9 GASTROESOPHAGEAL REFLUX DISEASE WITHOUT ESOPHAGITIS: ICD-10-CM

## 2021-07-08 DIAGNOSIS — G47.00 INSOMNIA, UNSPECIFIED TYPE: ICD-10-CM

## 2021-07-08 DIAGNOSIS — G47.09 OTHER INSOMNIA: ICD-10-CM

## 2021-07-08 DIAGNOSIS — E78.5 HYPERLIPIDEMIA, UNSPECIFIED HYPERLIPIDEMIA TYPE: ICD-10-CM

## 2021-07-08 DIAGNOSIS — H40.1132 PRIMARY OPEN ANGLE GLAUCOMA (POAG) OF BOTH EYES, MODERATE STAGE: ICD-10-CM

## 2021-07-08 DIAGNOSIS — F41.8 ANXIETY WITH DEPRESSION: ICD-10-CM

## 2021-07-08 DIAGNOSIS — M54.50 CHRONIC LOW BACK PAIN WITHOUT SCIATICA, UNSPECIFIED BACK PAIN LATERALITY: Primary | ICD-10-CM

## 2021-07-08 DIAGNOSIS — I10 ESSENTIAL HYPERTENSION: ICD-10-CM

## 2021-07-08 DIAGNOSIS — J44.9 CHRONIC OBSTRUCTIVE PULMONARY DISEASE, UNSPECIFIED COPD TYPE (HCC): ICD-10-CM

## 2021-07-08 DIAGNOSIS — Z12.31 BREAST CANCER SCREENING BY MAMMOGRAM: ICD-10-CM

## 2021-07-08 DIAGNOSIS — F32.A DEPRESSION, UNSPECIFIED DEPRESSION TYPE: ICD-10-CM

## 2021-07-08 DIAGNOSIS — K59.09 OTHER CONSTIPATION: ICD-10-CM

## 2021-07-08 DIAGNOSIS — E03.9 ACQUIRED HYPOTHYROIDISM: ICD-10-CM

## 2021-07-08 DIAGNOSIS — G89.29 CHRONIC LOW BACK PAIN WITHOUT SCIATICA, UNSPECIFIED BACK PAIN LATERALITY: Primary | ICD-10-CM

## 2021-07-08 LAB
ALBUMIN SERPL-MCNC: 4.4 G/DL (ref 3.5–5.2)
ALBUMIN/GLOB SERPL: 1.8 G/DL
ALP SERPL-CCNC: 84 U/L (ref 39–117)
ALT SERPL W P-5'-P-CCNC: 50 U/L (ref 1–33)
ANION GAP SERPL CALCULATED.3IONS-SCNC: 12.6 MMOL/L (ref 5–15)
AST SERPL-CCNC: 33 U/L (ref 1–32)
BASOPHILS # BLD AUTO: 0.04 10*3/MM3 (ref 0–0.2)
BASOPHILS NFR BLD AUTO: 0.6 % (ref 0–1.5)
BILIRUB SERPL-MCNC: 0.3 MG/DL (ref 0–1.2)
BUN SERPL-MCNC: 7 MG/DL (ref 6–20)
BUN/CREAT SERPL: 10.4 (ref 7–25)
CALCIUM SPEC-SCNC: 9.6 MG/DL (ref 8.6–10.5)
CHLORIDE SERPL-SCNC: 102 MMOL/L (ref 98–107)
CHOLEST SERPL-MCNC: 167 MG/DL (ref 0–200)
CO2 SERPL-SCNC: 28.4 MMOL/L (ref 22–29)
CREAT SERPL-MCNC: 0.67 MG/DL (ref 0.57–1)
DEPRECATED RDW RBC AUTO: 44.9 FL (ref 37–54)
EOSINOPHIL # BLD AUTO: 0.21 10*3/MM3 (ref 0–0.4)
EOSINOPHIL NFR BLD AUTO: 3.3 % (ref 0.3–6.2)
ERYTHROCYTE [DISTWIDTH] IN BLOOD BY AUTOMATED COUNT: 14.8 % (ref 12.3–15.4)
GFR SERPL CREATININE-BSD FRML MDRD: 91 ML/MIN/1.73
GLOBULIN UR ELPH-MCNC: 2.5 GM/DL
GLUCOSE SERPL-MCNC: 95 MG/DL (ref 65–99)
HCT VFR BLD AUTO: 43.3 % (ref 34–46.6)
HDLC SERPL-MCNC: 47 MG/DL (ref 40–60)
HGB BLD-MCNC: 14 G/DL (ref 12–15.9)
LDLC SERPL CALC-MCNC: 86 MG/DL (ref 0–100)
LDLC/HDLC SERPL: 1.69 {RATIO}
LYMPHOCYTES # BLD AUTO: 1.92 10*3/MM3 (ref 0.7–3.1)
LYMPHOCYTES NFR BLD AUTO: 30.3 % (ref 19.6–45.3)
MCH RBC QN AUTO: 26.9 PG (ref 26.6–33)
MCHC RBC AUTO-ENTMCNC: 32.3 G/DL (ref 31.5–35.7)
MCV RBC AUTO: 83.3 FL (ref 79–97)
MONOCYTES # BLD AUTO: 0.55 10*3/MM3 (ref 0.1–0.9)
MONOCYTES NFR BLD AUTO: 8.7 % (ref 5–12)
NEUTROPHILS NFR BLD AUTO: 3.6 10*3/MM3 (ref 1.7–7)
NEUTROPHILS NFR BLD AUTO: 56.8 % (ref 42.7–76)
PLATELET # BLD AUTO: 132 10*3/MM3 (ref 140–450)
PMV BLD AUTO: 14.3 FL (ref 6–12)
POTASSIUM SERPL-SCNC: 4 MMOL/L (ref 3.5–5.2)
PROT SERPL-MCNC: 6.9 G/DL (ref 6–8.5)
RBC # BLD AUTO: 5.2 10*6/MM3 (ref 3.77–5.28)
SODIUM SERPL-SCNC: 143 MMOL/L (ref 136–145)
TRIGL SERPL-MCNC: 202 MG/DL (ref 0–150)
TSH SERPL DL<=0.05 MIU/L-ACNC: 4.97 UIU/ML (ref 0.27–4.2)
VLDLC SERPL-MCNC: 34 MG/DL (ref 5–40)
WBC # BLD AUTO: 6.34 10*3/MM3 (ref 3.4–10.8)

## 2021-07-08 PROCEDURE — 80053 COMPREHEN METABOLIC PANEL: CPT | Performed by: NURSE PRACTITIONER

## 2021-07-08 PROCEDURE — 99214 OFFICE O/P EST MOD 30 MIN: CPT | Performed by: NURSE PRACTITIONER

## 2021-07-08 PROCEDURE — 85025 COMPLETE CBC W/AUTO DIFF WBC: CPT | Performed by: NURSE PRACTITIONER

## 2021-07-08 PROCEDURE — 80061 LIPID PANEL: CPT | Performed by: NURSE PRACTITIONER

## 2021-07-08 PROCEDURE — 84443 ASSAY THYROID STIM HORMONE: CPT | Performed by: NURSE PRACTITIONER

## 2021-07-08 NOTE — PROGRESS NOTES
Chief Complaint  Hyperlipidemia (fasting) and Back Pain    Subjective          Adrianne Lowe presents to [facility]  History of Present Illness    Patient today for follow-up of chronic problems.    Patient has chronic low back pain on gabapentin prefers further treatment however unable to get the doctor's appointment such as pain management or other therapy such as PT due to no transit.    Hyperlipidemia take medication as prescribed.  Down 5 pounds.    Insomnia without exacerbation taking prescribed meds as directed    Anxiety and depression taking meds as directed no SI HI no exacerbation    Seasonal allergies taking current prescribed meds no exacerbation    Essential hypertension blood pressure normal today no chest pain shortness of breath swelling no headache dizziness palpitations visual changes blood pressure normal    Class III severe obesity-Down 5 pounds    Primary open angle glaucoma regular follow-up with ophthalmology up-to-date    GERD constipation without exacerbation of symptoms prefers Amitiza denied Linzess not as helpful.  Lactulose not helpful.    Hypothyroidism takes levothyroxine as directed    High risk med use-gabapentin CSA UDS today.  Jose up-to-date and appropriate.  Note previous Jose without gabapentin and urine.  Recheck today.  Reviewed with patient if gabapentin is not present.              Objective   Vital Signs:   /82 (BP Location: Right arm, Patient Position: Sitting, Cuff Size: Adult)   Pulse 69   Temp 97.3 °F (36.3 °C) (Temporal)   Resp 18   Wt 110 kg (243 lb)   SpO2 98%   BMI 44.45 kg/m²     Physical Exam  Vitals and nursing note reviewed.   Constitutional:       Appearance: She is well-developed.   HENT:      Head: Normocephalic and atraumatic.      Right Ear: External ear normal.      Left Ear: External ear normal.      Nose: Nose normal.   Neck:      Thyroid: No thyromegaly.   Cardiovascular:       Rate and Rhythm: Normal rate and regular rhythm.   Pulmonary:      Effort: Pulmonary effort is normal.      Breath sounds: Normal breath sounds.   Abdominal:      General: Bowel sounds are normal. There is no distension.      Palpations: Abdomen is soft.      Tenderness: There is no abdominal tenderness.   Lymphadenopathy:      Cervical: No cervical adenopathy.   Skin:     Capillary Refill: Capillary refill takes 2 to 3 seconds.   Neurological:      Mental Status: She is alert and oriented to person, place, and time.   Psychiatric:         Behavior: Behavior normal.        Result Review :                 Assessment and Plan    Diagnoses and all orders for this visit:    1. Chronic low back pain without sciatica, unspecified back pain laterality (Primary)  -     Ambulatory Referral to Social Care Services (Amb Case Mgmt)    2. Hyperlipidemia, unspecified hyperlipidemia type  -     Comprehensive Metabolic Panel  -     Lipid Panel  -     Ambulatory Referral to Social Care Services (Amb Case Mgmt)    3. Insomnia, unspecified type  -     Ambulatory Referral to Social Care Services (Amb Case Mgmt)    4. Anxiety  -     Ambulatory Referral to Social Care Services (Amb Case Mgmt)    5. Depression, unspecified depression type  -     Ambulatory Referral to Social Care Services (Amb Case Mgmt)    6. Seasonal allergies  -     CBC & Differential  -     Ambulatory Referral to Social Care Services (Amb Case Mgmt)    7. Essential hypertension  -     Comprehensive Metabolic Panel  -     POC Urinalysis Dipstick, Automated  -     Ambulatory Referral to Social Care Services (Amb Case Mgmt)    8. Class 3 severe obesity due to excess calories with serious comorbidity and body mass index (BMI) of 40.0 to 44.9 in adult (CMS/[facility])  -     Ambulatory Referral to Social Care Services (Amb Case Mgmt)    9. Primary open angle glaucoma (POAG) of both eyes, moderate stage  -     Ambulatory Referral to Social Care Services (Amb Case Mgmt)    10.  Other constipation  -     Ambulatory Referral to Social Care Services (Amb Case Mgmt)    11. Gastroesophageal reflux disease without esophagitis  -     Ambulatory Referral to Social Care Services (Amb Case Mgmt)    12. Chronic obstructive pulmonary disease, unspecified COPD type (CMS/HCC)  -     Ambulatory Referral to Social Care Services (Amb Case Mgmt)    13. Other insomnia  -     Ambulatory Referral to Social Care Services (Amb Case Mgmt)    14. Anxiety with depression  -     Ambulatory Referral to Social Care Services (Amb Case Mgmt)    15. Acquired hypothyroidism  -     TSH  -     Ambulatory Referral to Social Care Services (Amb Case Mgmt)    16. High risk medication use  -     Cancel: Compliance Drug Analysis, Ur - Urine, Clean Catch; Future  -     Cancel: Compliance Drug Analysis, Ur - Urine, Clean Catch  -     Compliance Drug Analysis, Ur - Urine, Clean Catch; Future  -     Compliance Drug Analysis, Ur - Urine, Clean Catch  -     Ambulatory Referral to Social Care Services (Amb Case Mgmt)    17. Breast cancer screening by mammogram  -     Mammo Screening Digital Tomosynthesis Bilateral With CAD; Future  -     Ambulatory Referral to Social Care Services (Amb Case Mgmt)          Discussed the patient's BMI with her. BMI is above normal parameters. Recommendations include: educational material.    AWV: na  A1C: No results found for: HGBA1C   ACP: Advance Care Planning   ACP discussion was held with the patient during this visit. Patient does not have an advance directive, information provided.   Mammogram: Ordered   colonoscopy: 12/20 Colon polyp hemorrhoid recheck 5-year    Follow Up   Return in about 6 months (around 1/8/2022) for fasting, Annual.  Patient was given instructions and counseling regarding her condition or for health maintenance advice. Please see specific information pulled into the AVS if appropriate.   Social service to help with bus transit.  RTC/call  If symptoms worsen  Meds MOA and SE's  reviewed and pt v/u

## 2021-07-08 NOTE — PATIENT INSTRUCTIONS
MyPlate from USDA    MyPlate is an outline of a general healthy diet based on the 2010 Dietary Guidelines for Americans, from the U.S. Department of Agriculture (USDA). It sets guidelines for how much food you should eat from each food group based on your age, sex, and level of physical activity.  What are tips for following MyPlate?  To follow MyPlate recommendations:  · Eat a wide variety of fruits and vegetables, grains, and protein foods.  · Serve smaller portions and eat less food throughout the day.  · Limit portion sizes to avoid overeating.  · Enjoy your food.  · Get at least 150 minutes of exercise every week. This is about 30 minutes each day, 5 or more days per week.  It can be difficult to have every meal look like MyPlate. Think about MyPlate as eating guidelines for an entire day, rather than each individual meal.  Fruits and vegetables  · Make half of your plate fruits and vegetables.  · Eat many different colors of fruits and vegetables each day.  · For a 2,000 calorie daily food plan, eat:  ? 2½ cups of vegetables every day.  ? 2 cups of fruit every day.  · 1 cup is equal to:  ? 1 cup raw or cooked vegetables.  ? 1 cup raw fruit.  ? 1 medium-sized orange, apple, or banana.  ? 1 cup 100% fruit or vegetable juice.  ? 2 cups raw leafy greens, such as lettuce, spinach, or kale.  ? ½ cup dried fruit.  Grains  · One fourth of your plate should be grains.  · Make at least half of the grains you eat each day whole grains.  · For a 2,000 calorie daily food plan, eat 6 oz of grains every day.  · 1 oz is equal to:  ? 1 slice bread.  ? 1 cup cereal.  ? ½ cup cooked rice, cereal, or pasta.  Protein  · One fourth of your plate should be protein.  · Eat a wide variety of protein foods, including meat, poultry, fish, eggs, beans, nuts, and tofu.  · For a 2,000 calorie daily food plan, eat 5½ oz of protein every day.  · 1 oz is equal to:  ? 1 oz meat, poultry, or fish.  ? ¼ cup cooked beans.  ? 1 egg.  ? ½ oz nuts  or seeds.  ? 1 Tbsp peanut butter.  Dairy  · Drink fat-free or low-fat (1%) milk.  · Eat or drink dairy as a side to meals.  · For a 2,000 calorie daily food plan, eat or drink 3 cups of dairy every day.  · 1 cup is equal to:  ? 1 cup milk, yogurt, cottage cheese, or soy milk (soy beverage).  ? 2 oz processed cheese.  ? 1½ oz natural cheese.  Fats, oils, salt, and sugars  · Only small amounts of oils are recommended.  · Avoid foods that are high in calories and low in nutritional value (empty calories), like foods high in fat or added sugars.  · Choose foods that are low in salt (sodium). Choose foods that have less than 140 milligrams (mg) of sodium per serving.  · Drink water instead of sugary drinks. Drink enough water each day to keep your urine pale yellow.  Where to find support  · Work with your health care provider or a nutrition specialist (dietitian) to develop a customized eating plan that is right for you.  · Download an farhan (mobile application) to help you track your daily food intake.  Where to find more information  · Go to ChooseMyPlate.gov for more information.  Summary  · MyPlate is a general guideline for healthy eating from the USDA. It is based on the 2010 Dietary Guidelines for Americans.  · In general, fruits and vegetables should take up ½ of your plate, grains should take up ¼ of your plate, and protein should take up ¼ of your plate.  This information is not intended to replace advice given to you by your health care provider. Make sure you discuss any questions you have with your health care provider.  Document Revised: 05/21/2020 Document Reviewed: 03/19/2018  Elsevier Patient Education © 2021 Elsevier Inc.      Advance Directive    Advance directives are legal documents that let you make choices ahead of time about your health care and medical treatment in case you become unable to communicate for yourself. Advance directives are a way for you to make known your wishes to family,  friends, and health care providers. This can let others know about your end-of-life care if you become unable to communicate.  Discussing and writing advance directives should happen over time rather than all at once. Advance directives can be changed depending on your situation and what you want, even after you have signed the advance directives.  There are different types of advance directives, such as:  · Medical power of .  · Living will.  · Do not resuscitate (DNR) or do not attempt resuscitation (DNAR) order.  Health care proxy and medical power of   A health care proxy is also called a health care agent. This is a person who is appointed to make medical decisions for you in cases where you are unable to make the decisions yourself. Generally, people choose someone they know well and trust to represent their preferences. Make sure to ask this person for an agreement to act as your proxy. A proxy may have to exercise judgment in the event of a medical decision for which your wishes are not known.  A medical power of  is a legal document that names your health care proxy. Depending on the laws in your state, after the document is written, it may also need to be:  · Signed.  · Notarized.  · Dated.  · Copied.  · Witnessed.  · Incorporated into your medical record.  You may also want to appoint someone to manage your money in a situation in which you are unable to do so. This is called a durable power of  for finances. It is a separate legal document from the durable power of  for health care. You may choose the same person or someone different from your health care proxy to act as your agent in money matters.  If you do not appoint a proxy, or if there is a concern that the proxy is not acting in your best interests, a court may appoint a guardian to act on your behalf.  Living will  A living will is a set of instructions that state your wishes about medical care when you  cannot express them yourself. Health care providers should keep a copy of your living will in your medical record. You may want to give a copy to family members or friends. To alert caregivers in case of an emergency, you can place a card in your wallet to let them know that you have a living will and where they can find it. A living will is used if you become:  · Terminally ill.  · Disabled.  · Unable to communicate or make decisions.  Items to consider in your living will include:  · To use or not to use life-support equipment, such as dialysis machines and breathing machines (ventilators).  · A DNR or DNAR order. This tells health care providers not to use cardiopulmonary resuscitation (CPR) if breathing or heartbeat stops.  · To use or not to use tube feeding.  · To be given or not to be given food and fluids.  · Comfort (palliative) care when the goal becomes comfort rather than a cure.  · Donation of organs and tissues.  A living will does not give instructions for distributing your money and property if you should pass away.  DNR or DNAR  A DNR or DNAR order is a request not to have CPR in the event that your heart stops beating or you stop breathing. If a DNR or DNAR order has not been made and shared, a health care provider will try to help any patient whose heart has stopped or who has stopped breathing. If you plan to have surgery, talk with your health care provider about how your DNR or DNAR order will be followed if problems occur.  What if I do not have an advance directive?  If you do not have an advance directive, some states assign family decision makers to act on your behalf based on how closely you are related to them. Each state has its own laws about advance directives. You may want to check with your health care provider, , or state representative about the laws in your state.  Summary  · Advance directives are the legal documents that allow you to make choices ahead of time about your  health care and medical treatment in case you become unable to tell others about your care.  · The process of discussing and writing advance directives should happen over time. You can change the advance directives, even after you have signed them.  · Advance directives include DNR or DNAR orders, living aguila, and designating an agent as your medical power of .  This information is not intended to replace advice given to you by your health care provider. Make sure you discuss any questions you have with your health care provider.  Document Revised: 01/28/2021 Document Reviewed: 07/16/2020  Elsevier Patient Education © 2021 Elsevier Inc.

## 2021-07-09 ENCOUNTER — REFERRAL TRIAGE (OUTPATIENT)
Dept: CASE MANAGEMENT | Facility: OTHER | Age: 56
End: 2021-07-09

## 2021-07-09 DIAGNOSIS — E03.9 ACQUIRED HYPOTHYROIDISM: ICD-10-CM

## 2021-07-09 RX ORDER — LEVOTHYROXINE SODIUM 0.07 MG/1
75 TABLET ORAL DAILY
Qty: 30 TABLET | Refills: 0 | Status: SHIPPED | OUTPATIENT
Start: 2021-07-09 | End: 2021-08-02

## 2021-07-09 RX ORDER — GABAPENTIN 400 MG/1
CAPSULE ORAL
Qty: 90 CAPSULE | Refills: 1 | OUTPATIENT
Start: 2021-07-09

## 2021-07-09 RX ORDER — LEVOTHYROXINE SODIUM 0.05 MG/1
50 TABLET ORAL DAILY
Qty: 30 TABLET | Refills: 0 | Status: SHIPPED | OUTPATIENT
Start: 2021-07-09 | End: 2021-07-09 | Stop reason: SDUPTHER

## 2021-07-16 ENCOUNTER — PATIENT OUTREACH (OUTPATIENT)
Dept: CASE MANAGEMENT | Facility: OTHER | Age: 56
End: 2021-07-16

## 2021-07-16 NOTE — OUTREACH NOTE
Patient Outreach    SW reached out to pt to follow up on referral regarding transportation issues. Pt reported she would like some information on transportation. SW told pt about the [employer] [facility] and provided her the phone number to call and register. SDOH was completed and no other resources were requested at this time. SW will close case.   SDOH updated and reviewed with the patient during this program:     Transportation Needs: Unmet Transportation Needs   • Lack of Transportation (Medical): Yes   • Lack of Transportation (Non-Medical): Yes     Care Evaluation    Questions/Answers      Most Recent Value   Other Patient Education/Resources   Transportation   Transportation Education Method  Verbal        GHANSHYAM Perkins   , Ambulatory Case Management    7/16/2021 15:48 EDT

## 2021-08-02 DIAGNOSIS — F32.A DEPRESSION, UNSPECIFIED DEPRESSION TYPE: ICD-10-CM

## 2021-08-02 DIAGNOSIS — F41.9 ANXIETY: ICD-10-CM

## 2021-08-02 DIAGNOSIS — E03.9 ACQUIRED HYPOTHYROIDISM: ICD-10-CM

## 2021-08-02 RX ORDER — LEVOTHYROXINE SODIUM 0.07 MG/1
TABLET ORAL
Qty: 30 TABLET | Refills: 3 | Status: SHIPPED | OUTPATIENT
Start: 2021-08-02 | End: 2021-12-06

## 2021-08-02 RX ORDER — BUPROPION HYDROCHLORIDE 300 MG/1
TABLET ORAL
Qty: 30 TABLET | Refills: 3 | Status: SHIPPED | OUTPATIENT
Start: 2021-08-02 | End: 2021-12-06

## 2021-08-30 DIAGNOSIS — J44.9 CHRONIC OBSTRUCTIVE PULMONARY DISEASE, UNSPECIFIED COPD TYPE (HCC): ICD-10-CM

## 2021-08-30 DIAGNOSIS — K21.9 GASTROESOPHAGEAL REFLUX DISEASE WITHOUT ESOPHAGITIS: ICD-10-CM

## 2021-09-01 RX ORDER — LANSOPRAZOLE 30 MG/1
CAPSULE, DELAYED RELEASE ORAL
Qty: 90 CAPSULE | Refills: 0 | Status: SHIPPED | OUTPATIENT
Start: 2021-09-01 | End: 2022-05-16 | Stop reason: SDUPTHER

## 2021-09-01 RX ORDER — CETIRIZINE HYDROCHLORIDE 10 MG/1
TABLET ORAL
Qty: 90 TABLET | Refills: 0 | Status: SHIPPED | OUTPATIENT
Start: 2021-09-01 | End: 2022-05-16 | Stop reason: SDUPTHER

## 2021-09-16 ENCOUNTER — LAB (OUTPATIENT)
Dept: LAB | Facility: HOSPITAL | Age: 56
End: 2021-09-16

## 2021-09-16 ENCOUNTER — OFFICE VISIT (OUTPATIENT)
Dept: INTERNAL MEDICINE | Facility: CLINIC | Age: 56
End: 2021-09-16

## 2021-09-16 VITALS
TEMPERATURE: 97.7 F | DIASTOLIC BLOOD PRESSURE: 76 MMHG | BODY MASS INDEX: 42.84 KG/M2 | OXYGEN SATURATION: 93 % | RESPIRATION RATE: 18 BRPM | SYSTOLIC BLOOD PRESSURE: 132 MMHG | WEIGHT: 234.2 LBS | HEART RATE: 74 BPM

## 2021-09-16 DIAGNOSIS — Z13.29 THYROID DISORDER SCREEN: ICD-10-CM

## 2021-09-16 DIAGNOSIS — K21.9 GASTROESOPHAGEAL REFLUX DISEASE WITHOUT ESOPHAGITIS: ICD-10-CM

## 2021-09-16 DIAGNOSIS — K59.09 OTHER CONSTIPATION: ICD-10-CM

## 2021-09-16 DIAGNOSIS — I10 ESSENTIAL HYPERTENSION: ICD-10-CM

## 2021-09-16 DIAGNOSIS — E03.9 ACQUIRED HYPOTHYROIDISM: ICD-10-CM

## 2021-09-16 DIAGNOSIS — R11.0 NAUSEA: ICD-10-CM

## 2021-09-16 DIAGNOSIS — G47.00 INSOMNIA, UNSPECIFIED TYPE: ICD-10-CM

## 2021-09-16 DIAGNOSIS — Z00.00 ANNUAL PHYSICAL EXAM: Primary | ICD-10-CM

## 2021-09-16 DIAGNOSIS — E66.01 CLASS 3 SEVERE OBESITY DUE TO EXCESS CALORIES WITH SERIOUS COMORBIDITY AND BODY MASS INDEX (BMI) OF 40.0 TO 44.9 IN ADULT (HCC): ICD-10-CM

## 2021-09-16 DIAGNOSIS — E78.5 HYPERLIPIDEMIA, UNSPECIFIED HYPERLIPIDEMIA TYPE: ICD-10-CM

## 2021-09-16 DIAGNOSIS — H40.1132 PRIMARY OPEN ANGLE GLAUCOMA (POAG) OF BOTH EYES, MODERATE STAGE: ICD-10-CM

## 2021-09-16 DIAGNOSIS — G47.09 OTHER INSOMNIA: ICD-10-CM

## 2021-09-16 DIAGNOSIS — J44.9 CHRONIC OBSTRUCTIVE PULMONARY DISEASE, UNSPECIFIED COPD TYPE (HCC): ICD-10-CM

## 2021-09-16 DIAGNOSIS — J30.2 SEASONAL ALLERGIES: ICD-10-CM

## 2021-09-16 DIAGNOSIS — Z12.4 CERVICAL CANCER SCREENING: ICD-10-CM

## 2021-09-16 DIAGNOSIS — F41.8 ANXIETY WITH DEPRESSION: ICD-10-CM

## 2021-09-16 LAB
ALBUMIN SERPL-MCNC: 4.8 G/DL (ref 3.5–5.2)
ALBUMIN/GLOB SERPL: 1.5 G/DL
ALP SERPL-CCNC: 101 U/L (ref 39–117)
ALT SERPL W P-5'-P-CCNC: 34 U/L (ref 1–33)
ANION GAP SERPL CALCULATED.3IONS-SCNC: 12.9 MMOL/L (ref 5–15)
ANISOCYTOSIS BLD QL: ABNORMAL
AST SERPL-CCNC: 23 U/L (ref 1–32)
BASOPHILS # BLD MANUAL: 0.09 10*3/MM3 (ref 0–0.2)
BASOPHILS NFR BLD AUTO: 1 % (ref 0–1.5)
BILIRUB BLD-MCNC: NEGATIVE MG/DL
BILIRUB SERPL-MCNC: 0.3 MG/DL (ref 0–1.2)
BUN SERPL-MCNC: 16 MG/DL (ref 6–20)
BUN/CREAT SERPL: 24.6 (ref 7–25)
CALCIUM SPEC-SCNC: 9.8 MG/DL (ref 8.6–10.5)
CHLORIDE SERPL-SCNC: 101 MMOL/L (ref 98–107)
CHOLEST SERPL-MCNC: 146 MG/DL (ref 0–200)
CLARITY, POC: CLEAR
CO2 SERPL-SCNC: 24.1 MMOL/L (ref 22–29)
COLOR UR: YELLOW
CREAT SERPL-MCNC: 0.65 MG/DL (ref 0.57–1)
DEPRECATED RDW RBC AUTO: 43.4 FL (ref 37–54)
EOSINOPHIL # BLD MANUAL: 0.09 10*3/MM3 (ref 0–0.4)
EOSINOPHIL NFR BLD MANUAL: 1 % (ref 0.3–6.2)
ERYTHROCYTE [DISTWIDTH] IN BLOOD BY AUTOMATED COUNT: 14.6 % (ref 12.3–15.4)
EXPIRATION DATE: ABNORMAL
GFR SERPL CREATININE-BSD FRML MDRD: 94 ML/MIN/1.73
GLOBULIN UR ELPH-MCNC: 3.1 GM/DL
GLUCOSE SERPL-MCNC: 113 MG/DL (ref 65–99)
GLUCOSE UR STRIP-MCNC: NEGATIVE MG/DL
HCT VFR BLD AUTO: 43.8 % (ref 34–46.6)
HDLC SERPL-MCNC: 46 MG/DL (ref 40–60)
HGB BLD-MCNC: 14.6 G/DL (ref 12–15.9)
KETONES UR QL: NEGATIVE
LDLC SERPL CALC-MCNC: 77 MG/DL (ref 0–100)
LDLC/HDLC SERPL: 1.6 {RATIO}
LEUKOCYTE EST, POC: ABNORMAL
LYMPHOCYTES # BLD MANUAL: 1.28 10*3/MM3 (ref 0.7–3.1)
LYMPHOCYTES NFR BLD MANUAL: 15 % (ref 19.6–45.3)
LYMPHOCYTES NFR BLD MANUAL: 5 % (ref 5–12)
Lab: ABNORMAL
MCH RBC QN AUTO: 27.2 PG (ref 26.6–33)
MCHC RBC AUTO-ENTMCNC: 33.3 G/DL (ref 31.5–35.7)
MCV RBC AUTO: 81.6 FL (ref 79–97)
MONOCYTES # BLD AUTO: 0.43 10*3/MM3 (ref 0.1–0.9)
NEUTROPHILS # BLD AUTO: 6.63 10*3/MM3 (ref 1.7–7)
NEUTROPHILS NFR BLD MANUAL: 78 % (ref 42.7–76)
NITRITE UR-MCNC: NEGATIVE MG/ML
PH UR: 5 [PH] (ref 5–8)
PLAT MORPH BLD: NORMAL
PLATELET # BLD AUTO: 153 10*3/MM3 (ref 140–450)
PMV BLD AUTO: 14.2 FL (ref 6–12)
POLYCHROMASIA BLD QL SMEAR: ABNORMAL
POTASSIUM SERPL-SCNC: 4.3 MMOL/L (ref 3.5–5.2)
PROT SERPL-MCNC: 7.9 G/DL (ref 6–8.5)
PROT UR STRIP-MCNC: NEGATIVE MG/DL
RBC # BLD AUTO: 5.37 10*6/MM3 (ref 3.77–5.28)
RBC # UR STRIP: NEGATIVE /UL
SODIUM SERPL-SCNC: 138 MMOL/L (ref 136–145)
SP GR UR: 1.01 (ref 1–1.03)
T4 FREE SERPL-MCNC: 1.09 NG/DL (ref 0.93–1.7)
TRIGL SERPL-MCNC: 133 MG/DL (ref 0–150)
TSH SERPL DL<=0.05 MIU/L-ACNC: 5.15 UIU/ML (ref 0.27–4.2)
UROBILINOGEN UR QL: NORMAL
VLDLC SERPL-MCNC: 23 MG/DL (ref 5–40)
WBC # BLD AUTO: 8.5 10*3/MM3 (ref 3.4–10.8)
WBC MORPH BLD: NORMAL

## 2021-09-16 PROCEDURE — 36415 COLL VENOUS BLD VENIPUNCTURE: CPT

## 2021-09-16 PROCEDURE — 99214 OFFICE O/P EST MOD 30 MIN: CPT | Performed by: NURSE PRACTITIONER

## 2021-09-16 PROCEDURE — 85025 COMPLETE CBC W/AUTO DIFF WBC: CPT

## 2021-09-16 PROCEDURE — 84439 ASSAY OF FREE THYROXINE: CPT

## 2021-09-16 PROCEDURE — 80053 COMPREHEN METABOLIC PANEL: CPT

## 2021-09-16 PROCEDURE — 85007 BL SMEAR W/DIFF WBC COUNT: CPT

## 2021-09-16 PROCEDURE — 84443 ASSAY THYROID STIM HORMONE: CPT

## 2021-09-16 PROCEDURE — 81003 URINALYSIS AUTO W/O SCOPE: CPT | Performed by: NURSE PRACTITIONER

## 2021-09-16 PROCEDURE — 99396 PREV VISIT EST AGE 40-64: CPT | Performed by: NURSE PRACTITIONER

## 2021-09-16 PROCEDURE — 80061 LIPID PANEL: CPT

## 2021-09-16 NOTE — PROGRESS NOTES
Chief Complaint  Hyperlipidemia (Fasting fu)    Subjective          Adrianne Lowe presents to [facility]  History of Present Illness  Patient is here today for annual physical--needs Pap- due and is agreeable.  Patient had follow-up of chronic problems 7/21.    Mammogram 5/21 no acute finding repeat 1 year   7/21 UDS notes THC with gabapentin and trazodone--  Also reviewed with patient UDS from 4/21 did not have her gabapentin that she was prescribed noted in the drug screen. No longer usin gabapentin.    Colonoscopy 12/20-[facility]-colon polyps-internal hemorrhoids  Encouraged shingles vaccination    F/U  bp normal and down 9#  Takes miralax qd and bm qd    Diet-eating less- more veggies  Exercise-not yet but plans to start t[place] to a local park  Ophthalmology-y  Dental-y  No problems  Labs completed at last visit due again 1/22    New problem N 2 mo intermittent no known causes that she is aware of.- psh lap choley bm qd states on med for stomach - not on list will call.         Review of systems-no headache dizziness passing out chest pain shortness of breath no new lumps bumps rashes no abdominal pain no change in bowel bladder habits no fevers chills change of appetite no vomiting positive nausea  Objective   Vital Signs:   /76 (BP Location: Right arm, Patient Position: Sitting, Cuff Size: Adult)   Pulse 74   Temp 97.7 °F (36.5 °C) (Temporal)   Resp 18   Wt 106 kg (234 lb 3.2 oz)   SpO2 93%   BMI 42.84 kg/m²     Physical Exam  Vitals and nursing note reviewed. Exam conducted with a chaperone present.   Constitutional:       Appearance: She is well-developed.   HENT:      Head: Normocephalic and atraumatic.      Right Ear: External ear normal.      Left Ear: External ear normal.      Nose: Nose normal.   Eyes:      General: No scleral icterus.        Right eye: No discharge.         Left eye: No discharge.       Conjunctiva/sclera: Conjunctivae normal.      Pupils: Pupils are equal, round, and reactive to light.   Neck:      Thyroid: No thyromegaly.      Vascular: No carotid bruit.   Cardiovascular:      Rate and Rhythm: Normal rate and regular rhythm.      Heart sounds: Normal heart sounds. No murmur heard.   No friction rub. No gallop.    Pulmonary:      Effort: Pulmonary effort is normal.      Breath sounds: Normal breath sounds.   Chest:      Breasts:         Right: Normal.         Left: Normal.   Abdominal:      General: Bowel sounds are normal. There is no distension.      Palpations: Abdomen is soft. There is no mass.      Tenderness: There is no abdominal tenderness. There is no guarding or rebound.      Hernia: No hernia is present. There is no hernia in the left inguinal area or right inguinal area.   Genitourinary:     Vagina: Normal.      Cervix: Normal.      Uterus: Normal.       Adnexa: Right adnexa normal and left adnexa normal.      Rectum: Normal.   Musculoskeletal:         General: Normal range of motion.      Cervical back: Normal range of motion.   Lymphadenopathy:      Head:      Right side of head: No submental, submandibular, tonsillar, preauricular, posterior auricular or occipital adenopathy.      Left side of head: No submental, submandibular, tonsillar, preauricular, posterior auricular or occipital adenopathy.      Cervical: No cervical adenopathy.      Upper Body:      Right upper body: No supraclavicular, axillary, pectoral or epitrochlear adenopathy.      Left upper body: No supraclavicular, axillary, pectoral or epitrochlear adenopathy.      Lower Body: No right inguinal adenopathy. No left inguinal adenopathy.   Skin:     General: Skin is warm and dry.      Capillary Refill: Capillary refill takes 2 to 3 seconds.   Neurological:      Mental Status: She is alert and oriented to person, place, and time.      Deep Tendon Reflexes: Reflexes normal.   Psychiatric:         Behavior: Behavior  normal.         Thought Content: Thought content normal.         Judgment: Judgment normal.        Result Review :            Patient education regarding the importance of avoidance of texting while driving and the need for wearing seatbelt.  Use of sunscreen, use of helmets while on bikes.  The appropriate amounts of sleep and H20 consumption per day was reviewed with pt.  The need for healthy well-balanced diet based off the food guide pyramid and avoidance of skipping meals along with following a NCS diet with appropriate amounts of fats, protein, and carbohydrate and low sodium diet. Encouraging 30minutes of cardio 5d weekly and muscle strengthening 3x weekly.        Assessment and Plan    Diagnoses and all orders for this visit:    1. Annual physical exam (Primary)    2. Gastroesophageal reflux disease without esophagitis  -     CBC & Differential; Future  -     Comprehensive Metabolic Panel; Future  -     POC Urinalysis Dipstick, Automated; Future    3. Chronic obstructive pulmonary disease, unspecified COPD type (CMS/[facility])    4. Anxiety with depression    5. Hyperlipidemia, unspecified hyperlipidemia type  -     Comprehensive Metabolic Panel; Future  -     Lipid Panel; Future    6. Insomnia, unspecified type    7. Seasonal allergies    8. Essential hypertension    9. Class 3 severe obesity due to excess calories with serious comorbidity and body mass index (BMI) of 40.0 to 44.9 in adult (CMS/[facility])    10. Primary open angle glaucoma (POAG) of both eyes, moderate stage    11. Other constipation    12. Other insomnia    13. Nausea  -     XR Abdomen KUB; Future    14. Thyroid disorder screen  -     TSH; Future    15. Cervical cancer screening  -     Liquid-based Pap Smear, Screening; Future        Nausea will check x-ray when available patient will call medication she is taking for symptoms from home.  Discussed the patient's BMI with her. BMI is above normal parameters. Recommendations include: educational  material.  Patient education regarding the importance of avoidance of texting while driving and the need for wearing seatbelt.  Use of sunscreen, use of helmets while on bikes.  The appropriate amounts of sleep and H20 consumption per day was reviewed with pt.  The need for healthy well-balanced diet based off the food guide pyramid and avoidance of skipping meals along with following a NCS diet with appropriate amounts of fats, protein, and carbohydrate and low sodium diet. Encouraging 30minutes of cardio 5d weekly and muscle strengthening 3x weekly.   Encouraged shingirx vaccine.   AWV: na  A1C: No results found for: HGBA1C   ACP: Advance Care Planning   ACP discussion was held with the patient during this visit. Patient does not have an advance directive, information provided.   Mammogram: 9/21  Colonoscopy: 12/20    Follow Up   No follow-ups on file.  Patient was given instructions and counseling regarding her condition or for health maintenance advice. Please see specific information pulled into the AVS if appropriate.     RTC/call  If symptoms worsen  Meds MOA and SE's reviewed and pt v/u

## 2021-09-16 NOTE — PATIENT INSTRUCTIONS
Advance Care Planning and Advance Directives     You make decisions on a daily basis - decisions about where you want to live, your career, your home, your life. Perhaps one of the most important decisions you face is your choice for future medical care. Take time to talk with your family and your healthcare team and start planning today.  Advance Care Planning is a process that can help you:  · Understand possible future healthcare decisions in light of your own experiences  · Reflect on those decision in light of your goals and values  · Discuss your decisions with those closest to you and the healthcare professionals that care for you  · Make a plan by creating a document that reflects your wishes    Surrogate Decision Maker  In the event of a medical emergency, which has left you unable to communicate or to make your own decisions, you would need someone to make decisions for you.  It is important to discuss your preferences for medical treatment with this person while you are in good health.     Qualities of a surrogate decision maker:  • Willing to take on this role and responsibility  • Knows what you want for future medical care  • Willing to follow your wishes even if they don't agree with them  • Able to make difficult medical decisions under stressful circumstances    Advance Directives  These are legal documents you can create that will guide your healthcare team and decision maker(s) when needed. These documents can be stored in the electronic medical record.    · Living Will - a legal document to guide your care if you have a terminal condition or a serious illness and are unable to communicate. States vary by statute in document names/types, but most forms may include one or more of the following:        -  Directions regarding life-prolonging treatments        -  Directions regarding artificially provided nutrition/hydration        -  Choosing a healthcare decision maker        -  Direction  regarding organ/tissue donation    · Durable Power of  for Healthcare - this document names an -in-fact to make medical decisions for you, but it may also allow this person to make personal and financial decisions for you. Please seek the advice of an  if you need this type of document.    **Advance Directives are not required and no one may discriminate against you if you do not sign one.    Medical Orders  Many states allow specific forms/orders signed by your physician to record your wishes for medical treatment in your current state of health. This form, signed in personal communication with your physician, addresses resuscitation and other medical interventions that you may or may not want.      For more information or to schedule a time with a [facility] Advance Care Planning Facilitator contact: [facility] or call [phone] and someone will contact you directly.    MyPlate from USDA    MyPlate is an outline of a general healthy diet based on the 2010 Dietary Guidelines for Americans, from the U.S. Department of Agriculture (USDA). It sets guidelines for how much food you should eat from each food group based on your age, sex, and level of physical activity.  What are tips for following MyPlate?  To follow MyPlate recommendations:  · Eat a wide variety of fruits and vegetables, grains, and protein foods.  · Serve smaller portions and eat less food throughout the day.  · Limit portion sizes to avoid overeating.  · Enjoy your food.  · Get at least 150 minutes of exercise every week. This is about 30 minutes each day, 5 or more days per week.  It can be difficult to have every meal look like MyPlate. Think about MyPlate as eating guidelines for an entire day, rather than each individual meal.  Fruits and vegetables  · Make half of your plate fruits and vegetables.  · Eat many different colors of fruits and vegetables each day.  · For a 2,000 calorie daily food plan,  eat:  ? 2½ cups of vegetables every day.  ? 2 cups of fruit every day.  · 1 cup is equal to:  ? 1 cup raw or cooked vegetables.  ? 1 cup raw fruit.  ? 1 medium-sized orange, apple, or banana.  ? 1 cup 100% fruit or vegetable juice.  ? 2 cups raw leafy greens, such as lettuce, spinach, or kale.  ? ½ cup dried fruit.  Grains  · One fourth of your plate should be grains.  · Make at least half of the grains you eat each day whole grains.  · For a 2,000 calorie daily food plan, eat 6 oz of grains every day.  · 1 oz is equal to:  ? 1 slice bread.  ? 1 cup cereal.  ? ½ cup cooked rice, cereal, or pasta.  Protein  · One fourth of your plate should be protein.  · Eat a wide variety of protein foods, including meat, poultry, fish, eggs, beans, nuts, and tofu.  · For a 2,000 calorie daily food plan, eat 5½ oz of protein every day.  · 1 oz is equal to:  ? 1 oz meat, poultry, or fish.  ? ¼ cup cooked beans.  ? 1 egg.  ? ½ oz nuts or seeds.  ? 1 Tbsp peanut butter.  Dairy  · Drink fat-free or low-fat (1%) milk.  · Eat or drink dairy as a side to meals.  · For a 2,000 calorie daily food plan, eat or drink 3 cups of dairy every day.  · 1 cup is equal to:  ? 1 cup milk, yogurt, cottage cheese, or soy milk (soy beverage).  ? 2 oz processed cheese.  ? 1½ oz natural cheese.  Fats, oils, salt, and sugars  · Only small amounts of oils are recommended.  · Avoid foods that are high in calories and low in nutritional value (empty calories), like foods high in fat or added sugars.  · Choose foods that are low in salt (sodium). Choose foods that have less than 140 milligrams (mg) of sodium per serving.  · Drink water instead of sugary drinks. Drink enough water each day to keep your urine pale yellow.  Where to find support  · Work with your health care provider or a nutrition specialist (dietitian) to develop a customized eating plan that is right for you.  · Download an farhan (mobile application) to help you track your daily food  intake.  Where to find more information  · Go to ChooseMyPlate.gov for more information.  Summary  · MyPlate is a general guideline for healthy eating from the USDA. It is based on the 2010 Dietary Guidelines for Americans.  · In general, fruits and vegetables should take up ½ of your plate, grains should take up ¼ of your plate, and protein should take up ¼ of your plate.  This information is not intended to replace advice given to you by your health care provider. Make sure you discuss any questions you have with your health care provider.  Document Revised: 05/21/2020 Document Reviewed: 03/19/2018  Elsevier Patient Education © 2021 Elsevier Inc.    ACP information pamphlet given to the patient.  ACP information provided on the AVS.

## 2021-09-20 ENCOUNTER — TELEPHONE (OUTPATIENT)
Dept: INTERNAL MEDICINE | Facility: CLINIC | Age: 56
End: 2021-09-20

## 2021-09-20 DIAGNOSIS — E03.9 ACQUIRED HYPOTHYROIDISM: Primary | ICD-10-CM

## 2021-09-21 PROBLEM — Z12.4 PAP SMEAR FOR CERVICAL CANCER SCREENING: Status: ACTIVE | Noted: 2021-09-21

## 2021-09-22 DIAGNOSIS — E78.5 HYPERLIPIDEMIA, UNSPECIFIED HYPERLIPIDEMIA TYPE: ICD-10-CM

## 2021-09-22 RX ORDER — SIMVASTATIN 20 MG
TABLET ORAL
Qty: 30 TABLET | Refills: 2 | Status: SHIPPED | OUTPATIENT
Start: 2021-09-22 | End: 2021-12-15

## 2021-10-19 DIAGNOSIS — G47.00 INSOMNIA, UNSPECIFIED TYPE: ICD-10-CM

## 2021-10-20 RX ORDER — TRAZODONE HYDROCHLORIDE 50 MG/1
TABLET ORAL
Qty: 30 TABLET | Refills: 1 | Status: SHIPPED | OUTPATIENT
Start: 2021-10-20 | End: 2021-12-15

## 2021-11-01 DIAGNOSIS — I10 ESSENTIAL HYPERTENSION: ICD-10-CM

## 2021-11-01 RX ORDER — LISINOPRIL 30 MG/1
TABLET ORAL
Qty: 90 TABLET | Refills: 1 | Status: SHIPPED | OUTPATIENT
Start: 2021-11-01 | End: 2021-11-24

## 2021-11-05 DIAGNOSIS — G89.29 CHRONIC LOW BACK PAIN WITHOUT SCIATICA, UNSPECIFIED BACK PAIN LATERALITY: ICD-10-CM

## 2021-11-05 DIAGNOSIS — M54.50 CHRONIC LOW BACK PAIN WITHOUT SCIATICA, UNSPECIFIED BACK PAIN LATERALITY: ICD-10-CM

## 2021-11-05 RX ORDER — MELOXICAM 7.5 MG/1
TABLET ORAL
Qty: 30 TABLET | Refills: 5 | Status: SHIPPED | OUTPATIENT
Start: 2021-11-05 | End: 2022-04-26

## 2021-11-24 ENCOUNTER — OFFICE VISIT (OUTPATIENT)
Dept: INTERNAL MEDICINE | Facility: CLINIC | Age: 56
End: 2021-11-24

## 2021-11-24 VITALS
HEART RATE: 64 BPM | SYSTOLIC BLOOD PRESSURE: 162 MMHG | BODY MASS INDEX: 42.56 KG/M2 | OXYGEN SATURATION: 99 % | WEIGHT: 225.4 LBS | RESPIRATION RATE: 18 BRPM | TEMPERATURE: 97.5 F | HEIGHT: 61 IN | DIASTOLIC BLOOD PRESSURE: 90 MMHG

## 2021-11-24 DIAGNOSIS — M54.50 ACUTE RIGHT-SIDED LOW BACK PAIN, UNSPECIFIED WHETHER SCIATICA PRESENT: ICD-10-CM

## 2021-11-24 DIAGNOSIS — E78.5 HYPERLIPIDEMIA, UNSPECIFIED HYPERLIPIDEMIA TYPE: ICD-10-CM

## 2021-11-24 DIAGNOSIS — G47.00 INSOMNIA, UNSPECIFIED TYPE: ICD-10-CM

## 2021-11-24 DIAGNOSIS — K21.9 GASTROESOPHAGEAL REFLUX DISEASE WITHOUT ESOPHAGITIS: ICD-10-CM

## 2021-11-24 DIAGNOSIS — E03.9 ACQUIRED HYPOTHYROIDISM: ICD-10-CM

## 2021-11-24 DIAGNOSIS — F41.8 ANXIETY WITH DEPRESSION: ICD-10-CM

## 2021-11-24 DIAGNOSIS — E66.01 CLASS 3 SEVERE OBESITY DUE TO EXCESS CALORIES WITH SERIOUS COMORBIDITY AND BODY MASS INDEX (BMI) OF 40.0 TO 44.9 IN ADULT (HCC): ICD-10-CM

## 2021-11-24 DIAGNOSIS — R73.09 BLOOD SUGAR INCREASED: ICD-10-CM

## 2021-11-24 DIAGNOSIS — H40.1132 PRIMARY OPEN ANGLE GLAUCOMA (POAG) OF BOTH EYES, MODERATE STAGE: ICD-10-CM

## 2021-11-24 DIAGNOSIS — R10.84 GENERALIZED ABDOMINAL PAIN: ICD-10-CM

## 2021-11-24 DIAGNOSIS — I10 ESSENTIAL HYPERTENSION: ICD-10-CM

## 2021-11-24 DIAGNOSIS — Z00.00 ANNUAL PHYSICAL EXAM: Primary | ICD-10-CM

## 2021-11-24 DIAGNOSIS — J44.9 CHRONIC OBSTRUCTIVE PULMONARY DISEASE, UNSPECIFIED COPD TYPE (HCC): ICD-10-CM

## 2021-11-24 DIAGNOSIS — J30.2 SEASONAL ALLERGIES: ICD-10-CM

## 2021-11-24 LAB
ALBUMIN SERPL-MCNC: 4.6 G/DL (ref 3.5–5.2)
ALBUMIN/GLOB SERPL: 1.6 G/DL
ALP SERPL-CCNC: 89 U/L (ref 39–117)
ALT SERPL W P-5'-P-CCNC: 37 U/L (ref 1–33)
ANION GAP SERPL CALCULATED.3IONS-SCNC: 8.8 MMOL/L (ref 5–15)
AST SERPL-CCNC: 29 U/L (ref 1–32)
BASOPHILS # BLD AUTO: 0.05 10*3/MM3 (ref 0–0.2)
BASOPHILS NFR BLD AUTO: 0.5 % (ref 0–1.5)
BILIRUB BLD-MCNC: NEGATIVE MG/DL
BILIRUB SERPL-MCNC: 0.2 MG/DL (ref 0–1.2)
BUN SERPL-MCNC: 8 MG/DL (ref 6–20)
BUN/CREAT SERPL: 11.8 (ref 7–25)
CALCIUM SPEC-SCNC: 9.6 MG/DL (ref 8.6–10.5)
CHLORIDE SERPL-SCNC: 100 MMOL/L (ref 98–107)
CHOLEST SERPL-MCNC: 133 MG/DL (ref 0–200)
CLARITY, POC: CLEAR
CO2 SERPL-SCNC: 28.2 MMOL/L (ref 22–29)
COLOR UR: YELLOW
CREAT SERPL-MCNC: 0.68 MG/DL (ref 0.57–1)
DEPRECATED RDW RBC AUTO: 42.1 FL (ref 37–54)
EOSINOPHIL # BLD AUTO: 0.2 10*3/MM3 (ref 0–0.4)
EOSINOPHIL NFR BLD AUTO: 2 % (ref 0.3–6.2)
ERYTHROCYTE [DISTWIDTH] IN BLOOD BY AUTOMATED COUNT: 14.2 % (ref 12.3–15.4)
EXPIRATION DATE: NORMAL
GFR SERPL CREATININE-BSD FRML MDRD: 89 ML/MIN/1.73
GLOBULIN UR ELPH-MCNC: 2.8 GM/DL
GLUCOSE SERPL-MCNC: 83 MG/DL (ref 65–99)
GLUCOSE UR STRIP-MCNC: NEGATIVE MG/DL
HCT VFR BLD AUTO: 42.8 % (ref 34–46.6)
HDLC SERPL-MCNC: 44 MG/DL (ref 40–60)
HGB BLD-MCNC: 14.3 G/DL (ref 12–15.9)
KETONES UR QL: NEGATIVE
LDLC SERPL CALC-MCNC: 66 MG/DL (ref 0–100)
LDLC/HDLC SERPL: 1.42 {RATIO}
LEUKOCYTE EST, POC: NEGATIVE
LYMPHOCYTES # BLD AUTO: 3.09 10*3/MM3 (ref 0.7–3.1)
LYMPHOCYTES NFR BLD AUTO: 30.8 % (ref 19.6–45.3)
Lab: NORMAL
MCH RBC QN AUTO: 27.6 PG (ref 26.6–33)
MCHC RBC AUTO-ENTMCNC: 33.4 G/DL (ref 31.5–35.7)
MCV RBC AUTO: 82.6 FL (ref 79–97)
MONOCYTES # BLD AUTO: 0.65 10*3/MM3 (ref 0.1–0.9)
MONOCYTES NFR BLD AUTO: 6.5 % (ref 5–12)
NEUTROPHILS NFR BLD AUTO: 59.9 % (ref 42.7–76)
NEUTROPHILS NFR BLD AUTO: 6 10*3/MM3 (ref 1.7–7)
NITRITE UR-MCNC: NEGATIVE MG/ML
PH UR: 5 [PH] (ref 5–8)
PLATELET # BLD AUTO: 168 10*3/MM3 (ref 140–450)
POTASSIUM SERPL-SCNC: 3.9 MMOL/L (ref 3.5–5.2)
PROT SERPL-MCNC: 7.4 G/DL (ref 6–8.5)
PROT UR STRIP-MCNC: NEGATIVE MG/DL
RBC # BLD AUTO: 5.18 10*6/MM3 (ref 3.77–5.28)
RBC # UR STRIP: NEGATIVE /UL
SODIUM SERPL-SCNC: 137 MMOL/L (ref 136–145)
SP GR UR: 1.01 (ref 1–1.03)
TRIGL SERPL-MCNC: 133 MG/DL (ref 0–150)
TSH SERPL DL<=0.05 MIU/L-ACNC: 3.3 UIU/ML (ref 0.27–4.2)
UROBILINOGEN UR QL: NORMAL
VLDLC SERPL-MCNC: 23 MG/DL (ref 5–40)
WBC NRBC COR # BLD: 10.02 10*3/MM3 (ref 3.4–10.8)

## 2021-11-24 PROCEDURE — 85025 COMPLETE CBC W/AUTO DIFF WBC: CPT | Performed by: NURSE PRACTITIONER

## 2021-11-24 PROCEDURE — 80061 LIPID PANEL: CPT | Performed by: NURSE PRACTITIONER

## 2021-11-24 PROCEDURE — 83036 HEMOGLOBIN GLYCOSYLATED A1C: CPT | Performed by: NURSE PRACTITIONER

## 2021-11-24 PROCEDURE — 99213 OFFICE O/P EST LOW 20 MIN: CPT | Performed by: NURSE PRACTITIONER

## 2021-11-24 PROCEDURE — 84443 ASSAY THYROID STIM HORMONE: CPT | Performed by: NURSE PRACTITIONER

## 2021-11-24 PROCEDURE — 80053 COMPREHEN METABOLIC PANEL: CPT | Performed by: NURSE PRACTITIONER

## 2021-11-24 RX ORDER — LISINOPRIL 40 MG/1
40 TABLET ORAL
Qty: 30 TABLET | Refills: 2 | Status: SHIPPED | OUTPATIENT
Start: 2021-11-24 | End: 2022-02-07

## 2021-11-25 LAB — HBA1C MFR BLD: 6.2 % (ref 4.8–5.6)

## 2021-11-29 ENCOUNTER — PRIOR AUTHORIZATION (OUTPATIENT)
Dept: INTERNAL MEDICINE | Facility: CLINIC | Age: 56
End: 2021-11-29

## 2021-11-29 DIAGNOSIS — J44.9 CHRONIC OBSTRUCTIVE PULMONARY DISEASE, UNSPECIFIED COPD TYPE (HCC): ICD-10-CM

## 2021-11-29 NOTE — TELEPHONE ENCOUNTER
Adrianne Lowe Key: [id number]  Drug:Dulera 100-5MCG/ACT aerosol  Outcome  Additional Information Required  Member should be able to get the drug/product without a PA at this time.

## 2021-12-01 DIAGNOSIS — F32.A DEPRESSION, UNSPECIFIED DEPRESSION TYPE: ICD-10-CM

## 2021-12-01 DIAGNOSIS — F41.9 ANXIETY: ICD-10-CM

## 2021-12-01 DIAGNOSIS — E03.9 ACQUIRED HYPOTHYROIDISM: ICD-10-CM

## 2021-12-06 RX ORDER — LEVOTHYROXINE SODIUM 0.07 MG/1
TABLET ORAL
Qty: 30 TABLET | Refills: 3 | Status: SHIPPED | OUTPATIENT
Start: 2021-12-06 | End: 2021-12-21

## 2021-12-06 RX ORDER — BUPROPION HYDROCHLORIDE 300 MG/1
TABLET ORAL
Qty: 30 TABLET | Refills: 3 | Status: SHIPPED | OUTPATIENT
Start: 2021-12-06 | End: 2022-03-29

## 2021-12-10 PROBLEM — R73.03 PREDIABETES: Status: ACTIVE | Noted: 2021-12-10

## 2021-12-15 DIAGNOSIS — E78.5 HYPERLIPIDEMIA, UNSPECIFIED HYPERLIPIDEMIA TYPE: ICD-10-CM

## 2021-12-15 DIAGNOSIS — G47.00 INSOMNIA, UNSPECIFIED TYPE: ICD-10-CM

## 2021-12-15 RX ORDER — TRAZODONE HYDROCHLORIDE 50 MG/1
TABLET ORAL
Qty: 30 TABLET | Refills: 1 | Status: SHIPPED | OUTPATIENT
Start: 2021-12-15 | End: 2022-02-07

## 2021-12-15 RX ORDER — SIMVASTATIN 20 MG
TABLET ORAL
Qty: 30 TABLET | Refills: 2 | Status: SHIPPED | OUTPATIENT
Start: 2021-12-15 | End: 2022-03-16

## 2021-12-21 ENCOUNTER — TELEPHONE (OUTPATIENT)
Dept: INTERNAL MEDICINE | Facility: CLINIC | Age: 56
End: 2021-12-21

## 2021-12-21 NOTE — TELEPHONE ENCOUNTER
It doesn't work this way.  She will need to be seen.  The insurance company will request a copy of the office note connected to the visit during which the test was ordered.  If we don't have a supporting note, the test will be denied.    Please schedule her to see Yin.  If the pain is severe, another option is to go to the ER.  Wilbur Land MD  16:27 EST  12/21/21

## 2021-12-21 NOTE — TELEPHONE ENCOUNTER
Caller: Adrianne Lowe    Relationship: Self    Best call back number: [phone]     What orders are you requesting (i.e. lab or imaging): IMAGING - MRI    In what timeframe would the patient need to come in: ASAP    Where will you receive your lab/imaging services: [facility]     Additional notes: THE PATIENT REPORTS SHE WENT TO [facility] AT [facility] FOR BACK PAIN NEAR RIGHT HIP. THE PATIENT REPORTS THAT THE URGENT CARE TOLD THE PATIENT TO CALL HER PCP AND REQUEST AN ORDER FOR THE PATIENT TO HAVE AN MRI FOR THE PAIN SHE IS EXPERIENCING. PLEASE CALL THE PATIENT AND ADVISE [phone].

## 2021-12-29 ENCOUNTER — LAB (OUTPATIENT)
Dept: INTERNAL MEDICINE | Facility: CLINIC | Age: 56
End: 2021-12-29

## 2021-12-29 ENCOUNTER — LAB (OUTPATIENT)
Dept: LAB | Facility: HOSPITAL | Age: 56
End: 2021-12-29

## 2021-12-29 ENCOUNTER — HOSPITAL ENCOUNTER (OUTPATIENT)
Dept: GENERAL RADIOLOGY | Facility: HOSPITAL | Age: 56
Discharge: HOME | End: 2021-12-29

## 2021-12-29 DIAGNOSIS — Z00.00 ANNUAL PHYSICAL EXAM: ICD-10-CM

## 2021-12-29 DIAGNOSIS — I10 ESSENTIAL HYPERTENSION: ICD-10-CM

## 2021-12-29 DIAGNOSIS — R10.84 GENERALIZED ABDOMINAL PAIN: ICD-10-CM

## 2021-12-29 DIAGNOSIS — R82.998 LEUKOCYTES IN URINE: Primary | ICD-10-CM

## 2021-12-29 DIAGNOSIS — E03.9 ACQUIRED HYPOTHYROIDISM: ICD-10-CM

## 2021-12-29 DIAGNOSIS — E78.5 HYPERLIPIDEMIA, UNSPECIFIED HYPERLIPIDEMIA TYPE: ICD-10-CM

## 2021-12-29 LAB
ALBUMIN SERPL-MCNC: 4.7 G/DL (ref 3.5–5.2)
ALBUMIN/GLOB SERPL: 1.9 G/DL
ALP SERPL-CCNC: 111 U/L (ref 39–117)
ALT SERPL W P-5'-P-CCNC: 39 U/L (ref 1–33)
ANION GAP SERPL CALCULATED.3IONS-SCNC: 10.2 MMOL/L (ref 5–15)
AST SERPL-CCNC: 26 U/L (ref 1–32)
BILIRUB BLD-MCNC: NEGATIVE MG/DL
BILIRUB SERPL-MCNC: 0.2 MG/DL (ref 0–1.2)
BUN SERPL-MCNC: 9 MG/DL (ref 6–20)
BUN/CREAT SERPL: 13.4 (ref 7–25)
CALCIUM SPEC-SCNC: 9.8 MG/DL (ref 8.6–10.5)
CHLORIDE SERPL-SCNC: 102 MMOL/L (ref 98–107)
CHOLEST SERPL-MCNC: 179 MG/DL (ref 0–200)
CLARITY, POC: ABNORMAL
CO2 SERPL-SCNC: 30.8 MMOL/L (ref 22–29)
COLOR UR: YELLOW
CREAT SERPL-MCNC: 0.67 MG/DL (ref 0.57–1)
EXPIRATION DATE: ABNORMAL
GFR SERPL CREATININE-BSD FRML MDRD: 91 ML/MIN/1.73
GLOBULIN UR ELPH-MCNC: 2.5 GM/DL
GLUCOSE SERPL-MCNC: 100 MG/DL (ref 65–99)
GLUCOSE UR STRIP-MCNC: NEGATIVE MG/DL
HDLC SERPL-MCNC: 45 MG/DL (ref 40–60)
KETONES UR QL: NEGATIVE
LDLC SERPL CALC-MCNC: 100 MG/DL (ref 0–100)
LDLC/HDLC SERPL: 2.11 {RATIO}
LEUKOCYTE EST, POC: ABNORMAL
Lab: ABNORMAL
NITRITE UR-MCNC: NEGATIVE MG/ML
PH UR: 6 [PH] (ref 5–8)
POTASSIUM SERPL-SCNC: 3.6 MMOL/L (ref 3.5–5.2)
PROT SERPL-MCNC: 7.2 G/DL (ref 6–8.5)
PROT UR STRIP-MCNC: NEGATIVE MG/DL
RBC # UR STRIP: NEGATIVE /UL
SODIUM SERPL-SCNC: 143 MMOL/L (ref 136–145)
SP GR UR: 1.02 (ref 1–1.03)
TRIGL SERPL-MCNC: 195 MG/DL (ref 0–150)
UROBILINOGEN UR QL: ABNORMAL
VLDLC SERPL-MCNC: 34 MG/DL (ref 5–40)

## 2021-12-29 PROCEDURE — 84443 ASSAY THYROID STIM HORMONE: CPT | Performed by: NURSE PRACTITIONER

## 2021-12-29 PROCEDURE — 80061 LIPID PANEL: CPT | Performed by: NURSE PRACTITIONER

## 2021-12-29 PROCEDURE — 87086 URINE CULTURE/COLONY COUNT: CPT | Performed by: NURSE PRACTITIONER

## 2021-12-29 PROCEDURE — 80053 COMPREHEN METABOLIC PANEL: CPT | Performed by: NURSE PRACTITIONER

## 2021-12-29 PROCEDURE — 81003 URINALYSIS AUTO W/O SCOPE: CPT | Performed by: NURSE PRACTITIONER

## 2021-12-29 PROCEDURE — 85025 COMPLETE CBC W/AUTO DIFF WBC: CPT | Performed by: NURSE PRACTITIONER

## 2021-12-29 PROCEDURE — 87186 SC STD MICRODIL/AGAR DIL: CPT | Performed by: NURSE PRACTITIONER

## 2021-12-29 PROCEDURE — 74018 RADEX ABDOMEN 1 VIEW: CPT

## 2021-12-30 ENCOUNTER — OFFICE VISIT (OUTPATIENT)
Dept: INTERNAL MEDICINE | Facility: CLINIC | Age: 56
End: 2021-12-30

## 2021-12-30 VITALS
TEMPERATURE: 97.1 F | HEIGHT: 62 IN | OXYGEN SATURATION: 96 % | HEART RATE: 79 BPM | DIASTOLIC BLOOD PRESSURE: 84 MMHG | WEIGHT: 224.8 LBS | BODY MASS INDEX: 41.37 KG/M2 | SYSTOLIC BLOOD PRESSURE: 160 MMHG

## 2021-12-30 DIAGNOSIS — J00 ACUTE NASOPHARYNGITIS: ICD-10-CM

## 2021-12-30 DIAGNOSIS — J44.1 COPD WITH EXACERBATION: Primary | ICD-10-CM

## 2021-12-30 LAB
BASOPHILS # BLD AUTO: 0.04 10*3/MM3 (ref 0–0.2)
BASOPHILS NFR BLD AUTO: 0.4 % (ref 0–1.5)
DEPRECATED RDW RBC AUTO: 41.8 FL (ref 37–54)
EOSINOPHIL # BLD AUTO: 0.18 10*3/MM3 (ref 0–0.4)
EOSINOPHIL NFR BLD AUTO: 1.9 % (ref 0.3–6.2)
ERYTHROCYTE [DISTWIDTH] IN BLOOD BY AUTOMATED COUNT: 13.8 % (ref 12.3–15.4)
HCT VFR BLD AUTO: 43.7 % (ref 34–46.6)
HGB BLD-MCNC: 14.1 G/DL (ref 12–15.9)
LYMPHOCYTES # BLD AUTO: 2.17 10*3/MM3 (ref 0.7–3.1)
LYMPHOCYTES NFR BLD AUTO: 23.1 % (ref 19.6–45.3)
MCH RBC QN AUTO: 27.2 PG (ref 26.6–33)
MCHC RBC AUTO-ENTMCNC: 32.3 G/DL (ref 31.5–35.7)
MCV RBC AUTO: 84.2 FL (ref 79–97)
MONOCYTES # BLD AUTO: 0.6 10*3/MM3 (ref 0.1–0.9)
MONOCYTES NFR BLD AUTO: 6.4 % (ref 5–12)
NEUTROPHILS NFR BLD AUTO: 6.39 10*3/MM3 (ref 1.7–7)
NEUTROPHILS NFR BLD AUTO: 67.9 % (ref 42.7–76)
PLATELET # BLD AUTO: 163 10*3/MM3 (ref 140–450)
PMV BLD AUTO: 14.1 FL (ref 6–12)
RBC # BLD AUTO: 5.19 10*6/MM3 (ref 3.77–5.28)
TSH SERPL DL<=0.05 MIU/L-ACNC: 6.22 UIU/ML (ref 0.27–4.2)
WBC NRBC COR # BLD: 9.41 10*3/MM3 (ref 3.4–10.8)

## 2021-12-30 PROCEDURE — 99214 OFFICE O/P EST MOD 30 MIN: CPT | Performed by: NURSE PRACTITIONER

## 2021-12-30 PROCEDURE — U0004 COV-19 TEST NON-CDC HGH THRU: HCPCS | Performed by: NURSE PRACTITIONER

## 2021-12-30 RX ORDER — METHYLPREDNISOLONE 4 MG/1
TABLET ORAL
Qty: 21 TABLET | Refills: 0 | Status: SHIPPED | OUTPATIENT
Start: 2021-12-30 | End: 2022-05-16

## 2021-12-30 RX ORDER — L.ACIDOPHIL/L.PLANTAR/BIFIDO 7 15B CELL
1 CAPSULE ORAL DAILY
Qty: 30 CAPSULE | Refills: 0 | Status: SHIPPED | OUTPATIENT
Start: 2021-12-30

## 2021-12-30 RX ORDER — DOXYCYCLINE HYCLATE 100 MG/1
100 CAPSULE ORAL 2 TIMES DAILY
Qty: 20 CAPSULE | Refills: 0 | OUTPATIENT
Start: 2021-12-30 | End: 2022-04-28

## 2021-12-31 DIAGNOSIS — E03.9 ACQUIRED HYPOTHYROIDISM: Primary | ICD-10-CM

## 2021-12-31 LAB — SARS-COV-2 RNA NOSE QL NAA+PROBE: NOT DETECTED

## 2021-12-31 RX ORDER — LEVOTHYROXINE SODIUM 0.1 MG/1
100 TABLET ORAL DAILY
Qty: 90 TABLET | Refills: 0 | Status: SHIPPED | OUTPATIENT
Start: 2021-12-31 | End: 2022-02-03 | Stop reason: SDUPTHER

## 2022-01-02 LAB
BACTERIA UR CULT: ABNORMAL
BACTERIA UR CULT: ABNORMAL
OTHER ANTIBIOTIC SUSC ISLT: ABNORMAL

## 2022-02-02 ENCOUNTER — LAB (OUTPATIENT)
Dept: LAB | Facility: HOSPITAL | Age: 57
End: 2022-02-02

## 2022-02-02 ENCOUNTER — LAB (OUTPATIENT)
Dept: INTERNAL MEDICINE | Facility: CLINIC | Age: 57
End: 2022-02-02

## 2022-02-02 DIAGNOSIS — E03.9 ACQUIRED HYPOTHYROIDISM: ICD-10-CM

## 2022-02-02 LAB
T4 FREE SERPL-MCNC: 1.18 NG/DL (ref 0.93–1.7)
TSH SERPL DL<=0.05 MIU/L-ACNC: 2.86 UIU/ML (ref 0.27–4.2)

## 2022-02-02 PROCEDURE — 84443 ASSAY THYROID STIM HORMONE: CPT | Performed by: NURSE PRACTITIONER

## 2022-02-02 PROCEDURE — 84439 ASSAY OF FREE THYROXINE: CPT | Performed by: NURSE PRACTITIONER

## 2022-02-03 DIAGNOSIS — E03.9 ACQUIRED HYPOTHYROIDISM: ICD-10-CM

## 2022-02-03 RX ORDER — LEVOTHYROXINE SODIUM 0.1 MG/1
100 TABLET ORAL DAILY
Qty: 90 TABLET | Refills: 3 | Status: SHIPPED | OUTPATIENT
Start: 2022-02-03 | End: 2022-05-16 | Stop reason: SDUPTHER

## 2022-02-06 DIAGNOSIS — J44.9 CHRONIC OBSTRUCTIVE PULMONARY DISEASE, UNSPECIFIED COPD TYPE: ICD-10-CM

## 2022-02-06 DIAGNOSIS — I10 ESSENTIAL HYPERTENSION: ICD-10-CM

## 2022-02-06 DIAGNOSIS — G47.00 INSOMNIA, UNSPECIFIED TYPE: ICD-10-CM

## 2022-02-07 RX ORDER — LISINOPRIL 40 MG/1
TABLET ORAL
Qty: 30 TABLET | Refills: 2 | Status: SHIPPED | OUTPATIENT
Start: 2022-02-07 | End: 2022-05-16 | Stop reason: SDUPTHER

## 2022-02-07 RX ORDER — TRAZODONE HYDROCHLORIDE 50 MG/1
TABLET ORAL
Qty: 30 TABLET | Refills: 1 | Status: SHIPPED | OUTPATIENT
Start: 2022-02-07 | End: 2022-04-04

## 2022-03-01 DIAGNOSIS — H92.03 OTALGIA OF BOTH EARS: ICD-10-CM

## 2022-03-01 DIAGNOSIS — J06.9 VIRAL URI: ICD-10-CM

## 2022-03-01 DIAGNOSIS — U07.1 COVID-19 VIRUS INFECTION: ICD-10-CM

## 2022-03-01 RX ORDER — AZELASTINE 1 MG/ML
SPRAY, METERED NASAL
Qty: 30 EACH | Refills: 12 | Status: SHIPPED | OUTPATIENT
Start: 2022-03-01 | End: 2022-05-16 | Stop reason: SDUPTHER

## 2022-03-16 DIAGNOSIS — E78.5 HYPERLIPIDEMIA, UNSPECIFIED HYPERLIPIDEMIA TYPE: ICD-10-CM

## 2022-03-16 RX ORDER — SIMVASTATIN 20 MG
TABLET ORAL
Qty: 30 TABLET | Refills: 2 | Status: SHIPPED | OUTPATIENT
Start: 2022-03-16 | End: 2022-05-16 | Stop reason: SDUPTHER

## 2022-03-28 DIAGNOSIS — F32.A DEPRESSION, UNSPECIFIED DEPRESSION TYPE: ICD-10-CM

## 2022-03-28 DIAGNOSIS — F41.9 ANXIETY: ICD-10-CM

## 2022-03-29 RX ORDER — BUPROPION HYDROCHLORIDE 300 MG/1
TABLET ORAL
Qty: 30 TABLET | Refills: 3 | Status: SHIPPED | OUTPATIENT
Start: 2022-03-29 | End: 2022-05-16 | Stop reason: SDUPTHER

## 2022-04-03 DIAGNOSIS — G47.00 INSOMNIA, UNSPECIFIED TYPE: ICD-10-CM

## 2022-04-04 RX ORDER — TRAZODONE HYDROCHLORIDE 50 MG/1
TABLET ORAL
Qty: 30 TABLET | Refills: 1 | Status: SHIPPED | OUTPATIENT
Start: 2022-04-04 | End: 2022-05-16 | Stop reason: SDUPTHER

## 2022-04-15 ENCOUNTER — TELEMEDICINE (OUTPATIENT)
Dept: INTERNAL MEDICINE | Facility: CLINIC | Age: 57
End: 2022-04-15

## 2022-04-15 ENCOUNTER — TELEPHONE (OUTPATIENT)
Dept: INTERNAL MEDICINE | Facility: CLINIC | Age: 57
End: 2022-04-15

## 2022-04-15 VITALS — HEIGHT: 62 IN | BODY MASS INDEX: 38.64 KG/M2 | WEIGHT: 210 LBS

## 2022-04-15 DIAGNOSIS — R05.9 COUGH: Primary | ICD-10-CM

## 2022-04-15 PROCEDURE — 99213 OFFICE O/P EST LOW 20 MIN: CPT | Performed by: NURSE PRACTITIONER

## 2022-04-15 RX ORDER — POLYETHYLENE GLYCOL 400 AND PROPYLENE GLYCOL 4; 3 MG/ML; MG/ML
1 SOLUTION/ DROPS OPHTHALMIC
COMMUNITY
Start: 2022-03-14 | End: 2023-03-14

## 2022-04-15 RX ORDER — AZITHROMYCIN 250 MG/1
TABLET, FILM COATED ORAL
Qty: 6 TABLET | Refills: 0 | OUTPATIENT
Start: 2022-04-15 | End: 2022-04-28

## 2022-04-15 NOTE — TELEPHONE ENCOUNTER
Caller: Adrianne Lowe    Relationship: Self    Best call back number: [phone]    What medication are you requesting: ANTI- BIOTIC    What are your current symptoms: CONGESTION, RUNNY NOSE, COUGH, YELLOW/GREEN GUNK    How long have you been experiencing symptoms: 4 DAYS    Have you had these symptoms before:    [x] Yes  [] No    Have you been treated for these symptoms before:   [x] Yes  [] No    If a prescription is needed, what is your preferred pharmacy and phone number:  [facility]/pharmacy #[id number] - [place], [place] - [address] [place] [address] AT [facility] - [phone]  - [phone]      Additional notes:

## 2022-04-15 NOTE — TELEPHONE ENCOUNTER
Please call patient and set up a video visit or office visit this afternoon with patient.  PacketSled message sent to Fauzia Little.

## 2022-04-26 DIAGNOSIS — M54.50 CHRONIC LOW BACK PAIN WITHOUT SCIATICA, UNSPECIFIED BACK PAIN LATERALITY: ICD-10-CM

## 2022-04-26 DIAGNOSIS — G89.29 CHRONIC LOW BACK PAIN WITHOUT SCIATICA, UNSPECIFIED BACK PAIN LATERALITY: ICD-10-CM

## 2022-04-26 RX ORDER — MELOXICAM 7.5 MG/1
TABLET ORAL
Qty: 30 TABLET | Refills: 3 | Status: SHIPPED | OUTPATIENT
Start: 2022-04-26 | End: 2022-05-16 | Stop reason: SDUPTHER

## 2022-04-28 ENCOUNTER — TELEPHONE (OUTPATIENT)
Dept: INTERNAL MEDICINE | Facility: CLINIC | Age: 57
End: 2022-04-28

## 2022-04-28 PROCEDURE — U0004 COV-19 TEST NON-CDC HGH THRU: HCPCS | Performed by: NURSE PRACTITIONER

## 2022-04-28 NOTE — TELEPHONE ENCOUNTER
Pt stated she is not having any other Sx at this time. Pt stated she has not been exposed to anyone wih covid or flu, but just returned from [place] from a .

## 2022-04-28 NOTE — TELEPHONE ENCOUNTER
Caller: Adrianne Lowe    Relationship: Self    Best call back number:[phone]    What medication are you requesting: ANTIBIOTICS     What are your current symptoms: HEADACHE ,EARACHE, RUNNY NOSE, COUGH AND CHEST CONGESTION    How long have you been experiencing symptoms:     Have you had these symptoms before:    [] Yes  [x] No    Have you been treated for these symptoms before:   [] Yes  [x] No    If a prescription is needed, what is your preferred pharmacy and phone number: [facility]/PHARMACY #[id number] - [address] AT [facility] - [phone]  - [phone]      Additional notes:

## 2022-05-08 DIAGNOSIS — J44.9 CHRONIC OBSTRUCTIVE PULMONARY DISEASE, UNSPECIFIED COPD TYPE: ICD-10-CM

## 2022-05-16 ENCOUNTER — LAB (OUTPATIENT)
Dept: LAB | Facility: HOSPITAL | Age: 57
End: 2022-05-16

## 2022-05-16 ENCOUNTER — OFFICE VISIT (OUTPATIENT)
Dept: INTERNAL MEDICINE | Facility: CLINIC | Age: 57
End: 2022-05-16

## 2022-05-16 VITALS
BODY MASS INDEX: 38.64 KG/M2 | RESPIRATION RATE: 22 BRPM | SYSTOLIC BLOOD PRESSURE: 112 MMHG | WEIGHT: 210 LBS | DIASTOLIC BLOOD PRESSURE: 70 MMHG | HEART RATE: 81 BPM | TEMPERATURE: 96.9 F | HEIGHT: 62 IN | OXYGEN SATURATION: 97 %

## 2022-05-16 DIAGNOSIS — E66.01 CLASS 2 SEVERE OBESITY DUE TO EXCESS CALORIES WITH SERIOUS COMORBIDITY AND BODY MASS INDEX (BMI) OF 38.0 TO 38.9 IN ADULT: ICD-10-CM

## 2022-05-16 DIAGNOSIS — F41.9 ANXIETY AND DEPRESSION: ICD-10-CM

## 2022-05-16 DIAGNOSIS — K59.00 CONSTIPATION, UNSPECIFIED CONSTIPATION TYPE: ICD-10-CM

## 2022-05-16 DIAGNOSIS — J30.2 SEASONAL ALLERGIES: ICD-10-CM

## 2022-05-16 DIAGNOSIS — F41.9 ANXIETY: ICD-10-CM

## 2022-05-16 DIAGNOSIS — E78.5 HYPERLIPIDEMIA, UNSPECIFIED HYPERLIPIDEMIA TYPE: ICD-10-CM

## 2022-05-16 DIAGNOSIS — F32.A ANXIETY AND DEPRESSION: ICD-10-CM

## 2022-05-16 DIAGNOSIS — I10 ESSENTIAL HYPERTENSION: ICD-10-CM

## 2022-05-16 DIAGNOSIS — E03.9 ACQUIRED HYPOTHYROIDISM: ICD-10-CM

## 2022-05-16 DIAGNOSIS — F32.A DEPRESSION, UNSPECIFIED DEPRESSION TYPE: ICD-10-CM

## 2022-05-16 DIAGNOSIS — J44.9 CHRONIC OBSTRUCTIVE PULMONARY DISEASE, UNSPECIFIED COPD TYPE: ICD-10-CM

## 2022-05-16 DIAGNOSIS — J06.9 VIRAL URI: ICD-10-CM

## 2022-05-16 DIAGNOSIS — K21.9 GASTROESOPHAGEAL REFLUX DISEASE WITHOUT ESOPHAGITIS: ICD-10-CM

## 2022-05-16 DIAGNOSIS — Z12.31 SCREENING MAMMOGRAM FOR BREAST CANCER: ICD-10-CM

## 2022-05-16 DIAGNOSIS — G89.29 CHRONIC LOW BACK PAIN WITHOUT SCIATICA, UNSPECIFIED BACK PAIN LATERALITY: ICD-10-CM

## 2022-05-16 DIAGNOSIS — G47.00 INSOMNIA, UNSPECIFIED TYPE: ICD-10-CM

## 2022-05-16 DIAGNOSIS — M54.50 CHRONIC LOW BACK PAIN WITHOUT SCIATICA, UNSPECIFIED BACK PAIN LATERALITY: ICD-10-CM

## 2022-05-16 DIAGNOSIS — R10.13 EPIGASTRIC PAIN: ICD-10-CM

## 2022-05-16 DIAGNOSIS — M54.31 RIGHT SIDED SCIATICA: Primary | ICD-10-CM

## 2022-05-16 LAB
ALBUMIN SERPL-MCNC: 4.8 G/DL (ref 3.5–5.2)
ALBUMIN/GLOB SERPL: 1.7 G/DL
ALP SERPL-CCNC: 118 U/L (ref 39–117)
ALT SERPL W P-5'-P-CCNC: 41 U/L (ref 1–33)
ANION GAP SERPL CALCULATED.3IONS-SCNC: 13.7 MMOL/L (ref 5–15)
AST SERPL-CCNC: 29 U/L (ref 1–32)
BASOPHILS # BLD AUTO: 0.04 10*3/MM3 (ref 0–0.2)
BASOPHILS NFR BLD AUTO: 0.5 % (ref 0–1.5)
BILIRUB BLD-MCNC: NEGATIVE MG/DL
BILIRUB SERPL-MCNC: 0.4 MG/DL (ref 0–1.2)
BUN SERPL-MCNC: 13 MG/DL (ref 6–20)
BUN/CREAT SERPL: 18.3 (ref 7–25)
CALCIUM SPEC-SCNC: 9.8 MG/DL (ref 8.6–10.5)
CHLORIDE SERPL-SCNC: 99 MMOL/L (ref 98–107)
CHOLEST SERPL-MCNC: 177 MG/DL (ref 0–200)
CLARITY, POC: CLEAR
CO2 SERPL-SCNC: 23.3 MMOL/L (ref 22–29)
COLOR UR: YELLOW
CREAT SERPL-MCNC: 0.71 MG/DL (ref 0.57–1)
DEPRECATED RDW RBC AUTO: 39.9 FL (ref 37–54)
EGFRCR SERPLBLD CKD-EPI 2021: 99.3 ML/MIN/1.73
EOSINOPHIL # BLD AUTO: 0.15 10*3/MM3 (ref 0–0.4)
EOSINOPHIL NFR BLD AUTO: 1.7 % (ref 0.3–6.2)
ERYTHROCYTE [DISTWIDTH] IN BLOOD BY AUTOMATED COUNT: 13.7 % (ref 12.3–15.4)
EXPIRATION DATE: NORMAL
GLOBULIN UR ELPH-MCNC: 2.8 GM/DL
GLUCOSE SERPL-MCNC: 88 MG/DL (ref 65–99)
GLUCOSE UR STRIP-MCNC: NEGATIVE MG/DL
HCT VFR BLD AUTO: 43.2 % (ref 34–46.6)
HDLC SERPL-MCNC: 49 MG/DL (ref 40–60)
HGB BLD-MCNC: 14.6 G/DL (ref 12–15.9)
IMM GRANULOCYTES # BLD AUTO: 0.02 10*3/MM3 (ref 0–0.05)
IMM GRANULOCYTES NFR BLD AUTO: 0.2 % (ref 0–0.5)
KETONES UR QL: NEGATIVE
LDLC SERPL CALC-MCNC: 104 MG/DL (ref 0–100)
LDLC/HDLC SERPL: 2.05 {RATIO}
LEUKOCYTE EST, POC: NEGATIVE
LYMPHOCYTES # BLD AUTO: 2.33 10*3/MM3 (ref 0.7–3.1)
LYMPHOCYTES NFR BLD AUTO: 26.9 % (ref 19.6–45.3)
Lab: NORMAL
MCH RBC QN AUTO: 27.6 PG (ref 26.6–33)
MCHC RBC AUTO-ENTMCNC: 33.8 G/DL (ref 31.5–35.7)
MCV RBC AUTO: 81.7 FL (ref 79–97)
MONOCYTES # BLD AUTO: 0.6 10*3/MM3 (ref 0.1–0.9)
MONOCYTES NFR BLD AUTO: 6.9 % (ref 5–12)
NEUTROPHILS NFR BLD AUTO: 5.51 10*3/MM3 (ref 1.7–7)
NEUTROPHILS NFR BLD AUTO: 63.8 % (ref 42.7–76)
NITRITE UR-MCNC: NEGATIVE MG/ML
NRBC BLD AUTO-RTO: 0 /100 WBC (ref 0–0.2)
PH UR: 5 [PH] (ref 5–8)
PLATELET # BLD AUTO: 140 10*3/MM3 (ref 140–450)
PMV BLD AUTO: 14.6 FL (ref 6–12)
POTASSIUM SERPL-SCNC: 4 MMOL/L (ref 3.5–5.2)
PROT SERPL-MCNC: 7.6 G/DL (ref 6–8.5)
PROT UR STRIP-MCNC: NEGATIVE MG/DL
RBC # BLD AUTO: 5.29 10*6/MM3 (ref 3.77–5.28)
RBC # UR STRIP: NEGATIVE /UL
SODIUM SERPL-SCNC: 136 MMOL/L (ref 136–145)
SP GR UR: 1 (ref 1–1.03)
TRIGL SERPL-MCNC: 137 MG/DL (ref 0–150)
TSH SERPL DL<=0.05 MIU/L-ACNC: 5.25 UIU/ML (ref 0.27–4.2)
UROBILINOGEN UR QL: NORMAL
VLDLC SERPL-MCNC: 24 MG/DL (ref 5–40)
WBC NRBC COR # BLD: 8.65 10*3/MM3 (ref 3.4–10.8)

## 2022-05-16 PROCEDURE — 85025 COMPLETE CBC W/AUTO DIFF WBC: CPT | Performed by: NURSE PRACTITIONER

## 2022-05-16 PROCEDURE — 84443 ASSAY THYROID STIM HORMONE: CPT | Performed by: NURSE PRACTITIONER

## 2022-05-16 PROCEDURE — 80053 COMPREHEN METABOLIC PANEL: CPT | Performed by: NURSE PRACTITIONER

## 2022-05-16 PROCEDURE — 99214 OFFICE O/P EST MOD 30 MIN: CPT | Performed by: NURSE PRACTITIONER

## 2022-05-16 PROCEDURE — 80061 LIPID PANEL: CPT | Performed by: NURSE PRACTITIONER

## 2022-05-16 RX ORDER — TRAZODONE HYDROCHLORIDE 50 MG/1
50 TABLET ORAL NIGHTLY
Qty: 90 TABLET | Refills: 1 | Status: SHIPPED | OUTPATIENT
Start: 2022-05-16 | End: 2022-10-24

## 2022-05-16 RX ORDER — SIMVASTATIN 20 MG
20 TABLET ORAL
Qty: 90 TABLET | Refills: 1 | Status: SHIPPED | OUTPATIENT
Start: 2022-05-16 | End: 2022-08-29

## 2022-05-16 RX ORDER — POLYETHYLENE GLYCOL 3350 17 G/17G
17 POWDER, FOR SOLUTION ORAL DAILY
Qty: 90 PACKET | Refills: 1 | Status: SHIPPED | OUTPATIENT
Start: 2022-05-16 | End: 2022-12-15

## 2022-05-16 RX ORDER — CETIRIZINE HYDROCHLORIDE 10 MG/1
10 TABLET ORAL DAILY
Qty: 90 TABLET | Refills: 1 | Status: SHIPPED | OUTPATIENT
Start: 2022-05-16 | End: 2022-11-08

## 2022-05-16 RX ORDER — LANSOPRAZOLE 30 MG/1
30 CAPSULE, DELAYED RELEASE ORAL DAILY
Qty: 90 CAPSULE | Refills: 1 | Status: SHIPPED | OUTPATIENT
Start: 2022-05-16 | End: 2023-01-11

## 2022-05-16 RX ORDER — LEVOTHYROXINE SODIUM 0.1 MG/1
100 TABLET ORAL DAILY
Qty: 90 TABLET | Refills: 3 | Status: SHIPPED | OUTPATIENT
Start: 2022-05-16 | End: 2022-05-16 | Stop reason: SDUPTHER

## 2022-05-16 RX ORDER — LEVOTHYROXINE SODIUM 112 UG/1
112 TABLET ORAL DAILY
Qty: 30 TABLET | Refills: 1 | Status: SHIPPED | OUTPATIENT
Start: 2022-05-16 | End: 2022-07-13 | Stop reason: SDUPTHER

## 2022-05-16 RX ORDER — FLUTICASONE PROPIONATE 50 MCG
2 SPRAY, SUSPENSION (ML) NASAL DAILY
Qty: 16 G | Refills: 11 | Status: SHIPPED | OUTPATIENT
Start: 2022-05-16

## 2022-05-16 RX ORDER — ALBUTEROL SULFATE 90 UG/1
2 AEROSOL, METERED RESPIRATORY (INHALATION) EVERY 4 HOURS PRN
Qty: 18 G | Refills: 2 | Status: SHIPPED | OUTPATIENT
Start: 2022-05-16

## 2022-05-16 RX ORDER — BUPROPION HYDROCHLORIDE 300 MG/1
300 TABLET ORAL DAILY
Qty: 90 TABLET | Refills: 1 | Status: SHIPPED | OUTPATIENT
Start: 2022-05-16 | End: 2022-11-23

## 2022-05-16 RX ORDER — LANSOPRAZOLE 30 MG/1
30 CAPSULE, DELAYED RELEASE ORAL DAILY
Qty: 90 CAPSULE | Refills: 1 | Status: SHIPPED | OUTPATIENT
Start: 2022-05-16 | End: 2022-05-16 | Stop reason: SDUPTHER

## 2022-05-16 RX ORDER — LISINOPRIL 40 MG/1
40 TABLET ORAL
Qty: 90 TABLET | Refills: 1 | Status: SHIPPED | OUTPATIENT
Start: 2022-05-16 | End: 2022-08-29

## 2022-05-16 RX ORDER — AZELASTINE 1 MG/ML
1 SPRAY, METERED NASAL DAILY
Qty: 30 EACH | Refills: 12 | Status: SHIPPED | OUTPATIENT
Start: 2022-05-16

## 2022-05-16 RX ORDER — MELOXICAM 7.5 MG/1
7.5 TABLET ORAL DAILY
Qty: 90 TABLET | Refills: 1 | Status: SHIPPED | OUTPATIENT
Start: 2022-05-16 | End: 2023-01-11

## 2022-05-16 NOTE — PATIENT INSTRUCTIONS
MyPlate from USDA    MyPlate is an outline of a general healthy diet based on the 2010 Dietary Guidelines for Americans, from the U.S. Department of Agriculture (USDA). It sets guidelines for how To follow MyPlate recommendations:  Eat a wide variety of fruits and vegetables, grains, and protein foods.  Serve smaller portions and eat less food throughout the day.  Limit portion sizes to avoid overeating.  Enjoy your food.  Get at least 150 minutes of exercise every week. This is about 30 minutes each day, 5 or more days per week.  It can be difficult to have every meal look like MyPlate. Think about MyPlate as eating guidelines for an entire day, rather than each individual meal.  Fruits and vegetables  Make half of your plate fruits and vegetables.  Eat many different colors of fruits and vegetables each day.  For a 2,000 calorie daily food plan, eat:  2½ cups of vegetables every day.  2 cups of fruit every day.  1 cup is equal to:  1 cup raw or cooked vegetables.  1 cup raw fruit.  1 medium-sized orange, apple, or banana.  1 cup 100% fruit or vegetable juice.  2 cups raw leafy greens, such as lettuce, spinach, or kale.  ½ cup dried fruit.  Grains  One fourth of your plate should be grains.  Make at least half of the grains you eat each day whole grains.  For a 2,000 calorie daily food plan, eat 6 oz of grains every day.  1 oz is equal to:  1 slice bread.  1 cup cereal.  ½ cup cooked rice, cereal, or pasta.  Protein  One fourth of your plate should be protein.  Eat a wide variety of protein foods, including meat, poultry, fish, eggs, beans, nuts, and tofu.  For a 2,000 calorie daily food plan, eat 5½ oz of protein every day.  1 oz is equal to:  1 oz meat, poultry, or fish.  ¼ cup cooked beans.  1 egg.  ½ oz nuts or seeds.  1 Tbsp peanut butter.  Dairy  Drink fat-free or low-fat (1%) milk.  Eat or drink dairy as a side to meals.  For a 2,000 calorie daily food plan, eat or drink 3 cups of dairy every day.  1 cup is  equal to:  1 cup milk, yogurt, cottage cheese, or soy milk (soy beverage).  2 oz processed cheese.  1½ oz natural cheese.  Fats, oils, salt, and sugars  Only small amounts of oils are recommended.  Avoid foods that are high in calories and low in nutritional value (empty calories), like foods high in fat or added sugars.  Choose foods that are low in salt (sodium). Choose foods that have less than 140 milligrams (mg) of sodium per serving.  Drink water instead of sugary drinks. Drink enough water each day to keep your urine pale yellow.  Where to find support  Work with your health care provider or a nutrition specialist (dietitian) to develop a customized eating plan that is right for you.  Download an farhan (mobile application) to help you track your daily food intake.  Where to find more information  Go to ChooseMyPlate.gov for more information.  Summary  MyPlate is a general guideline for healthy eating from the USDA. It is based on the 2010 Dietary Guidelines for Americans.  In general, fruits and vegetables should take up ½ of your plate, grains should take up ¼ of your plate, and protein should take up ¼ of your plate.  This information is not intended to replace advice given to you by your health care provider. Make sure you discuss any questions you have with your health care provider.  Document Revised: 05/21/2020 Document Reviewed: 03/19/2018  Elsevier Patient Education © 2021 Elsevier Inc.    Advance Care Planning and Advance Directives     You make decisions on a daily basis - decisions about where you want to live, your career, your home, your life. Perhaps one of the most important decisions you face is your choice for future medical care. Take time to talk with your family and your healthcare team and start planning today.  Advance Care Planning is a process that can help you:  Understand possible future healthcare decisions in light of your own experiences  Reflect on those decision in light of your  goals and values  Discuss your decisions with those closest to you and the healthcare professionals that care for you  Make a plan by creating a document that reflects your wishes    Surrogate Decision Maker  In the event of a medical emergency, which has left you unable to communicate or to make your own decisions, you would need someone to make decisions for you.  It is important to discuss your preferences for medical treatment with this person while you are in good health.     Qualities of a surrogate decision maker:  Willing to take on this role and responsibility  Knows what you want for future medical care  Willing to follow your wishes even if they don't agree with them  Able to make difficult medical decisions under stressful circumstances    Advance Directives  These are legal documents you can create that will guide your healthcare team and decision maker(s) when needed. These documents can be stored in the electronic medical record.    Living Will - a legal document to guide your care if you have a terminal condition or a serious illness and are unable to communicate. States vary by statute in document names/types, but most forms may include one or more of the following:        -  Directions regarding life-prolonging treatments        -  Directions regarding artificially provided nutrition/hydration        -  Choosing a healthcare decision maker        -  Direction regarding organ/tissue donation    Durable Power of  for Healthcare - this document names an -in-fact to make medical decisions for you, but it may also allow this person to make personal and financial decisions for you. Please seek the advice of an  if you need this type of document.    **Advance Directives are not required and no one may discriminate against you if you do not sign one.    Medical Orders  Many states allow specific forms/orders signed by your physician to record your wishes for medical treatment in  your current state of health. This form, signed in personal communication with your physician, addresses resuscitation and other medical interventions that you may or may not want.      For more information or to schedule a time with a [facility] Advance Care Planning Facilitator contact: [email or web address] or call [phone] and someone will contact you directly.

## 2022-05-16 NOTE — PROGRESS NOTES
"Chief Complaint  Hyperlipidemia (Follow up), Hypertension, and Hip Pain (Right hip pain)    Subjective          Adrianne Lowe presents to [facility]  History of Present Illness     Right sided sciatica  The patient complains of pain in the right hip that radiates down the right leg to the right knee. She states the pain has been bothering her for \"awhile\". The patient denies any prior injury or treatment. She agrees to trying therapy at this time. She notes she takes 3 Aleve and walks for relief. The patient is currently taking meloxicam daily as well as taking 3 Aleve as needed for pain relief.    Allergies  The patient state she has been coughing and has nasal drainage. She is currently prescribed Zyrtec 10 mg daily in which she states she has not been taking. She notes she has been taking the Flonase and Astelin nose spray occasionally.    Constipation  The patient states she is experiencing constipation. She notes she is taking Miralax daily.    Hypothyroidism  The patient agrees she is taking levothyroxine daily. The patient states she is trying to exercise daily.    Anxiety and Depression  The patient state her mother has recently passed away and her anxiety and depression has worsened. She notes she is not taking the Wellbutrin at this time.    COPD  The patient relays she is using the albuterol quick inhaler daily as well as Dulera, and the Spiriva. The patient states she knows to rinse her mouth after each each of the Dulera.    Hypertension  The patient is currently taking lisinopril 40 mg daily. She states she was experiencing dizziness on 05/16/2022. She denies chest pain and shortness of breath. The patient's blood pressure after being rechecked after being in the exam room is 122/70 mmHg.    Insomnia  The patient is currently taking trazodone nightly.    Mid upper epigastric pain  The patient states She has been experiencing mid upper " "epigastric pain for 3 weeks. She has felt nauseated and vomited on 05/15/2022. She notes 05/15/2022 was the first day she vomited \"in awhile\". She states the color is brown but does not look like coffee grounds. The patient notes she was taking lansoprazole but ran out of it.    Health maintenance  The patient is due for a shingles vaccine. She is due for a mammogram in 09/2022. Her colonoscopy is due in 2025. The patient states she does not have a living will.    No new chest pain, shortness of breath, headaches, visual changes, dizziness, palpitations, syncope, swelling in the lower extremities, or shortness of breath when laying down. No abdominal pain, constipation, hematuria or hematochezia, or dysuria. No new lumps, bumps, or rashes.    Objective   Vital Signs:   /70   Pulse 81   Temp 96.9 °F (36.1 °C) (Infrared)   Resp 22   Ht 157.5 cm (62\")   Wt 95.3 kg (210 lb)   SpO2 97%   BMI 38.41 kg/m²     Physical Exam  Vitals and nursing note reviewed.   Constitutional:       General: She is not in acute distress.     Appearance: Normal appearance. She is well-developed. She is not ill-appearing.   HENT:      Head: Normocephalic and atraumatic.      Right Ear: Tympanic membrane and external ear normal.      Left Ear: Tympanic membrane and external ear normal.      Nose: Nose normal.      Mouth/Throat:      Pharynx: Oropharynx is clear. No posterior oropharyngeal erythema.   Eyes:      General:         Right eye: No discharge.         Left eye: No discharge.      Conjunctiva/sclera: Conjunctivae normal.   Neck:      Thyroid: No thyromegaly.   Cardiovascular:      Rate and Rhythm: Normal rate and regular rhythm.   Pulmonary:      Effort: Pulmonary effort is normal.      Breath sounds: Normal breath sounds.   Abdominal:      General: Bowel sounds are normal. There is no distension.      Palpations: Abdomen is soft. There is no mass.      Tenderness: Tenderness: mid upper epi.      Hernia: No hernia is " present.      Comments: Emmett homans   Musculoskeletal:      Comments: Muscle strength 5 of 5 in all major muscle groups DTRs 2+ pulses 2+ free range of motion   Lymphadenopathy:      Cervical: No cervical adenopathy.   Skin:     Capillary Refill: Capillary refill takes 2 to 3 seconds.   Neurological:      Mental Status: She is alert and oriented to person, place, and time.   Psychiatric:         Mood and Affect: Mood normal.         Behavior: Behavior normal.        Result Review :                 Assessment and Plan    Diagnoses and all orders for this visit:    Right sided sciatica (Primary)  -     Ambulatory Referral to Physical Therapy Evaluate and treat  -     meloxicam (MOBIC) 7.5 MG tablet; Take 1 tablet by mouth Daily.  Dispense: 90 tablet; Refill: 1     Seasonal allergies  -     cetirizine (zyrTEC) 10 MG tablet; Take 1 tablet by mouth Daily.  Dispense: 90 tablet; Refill: 1  -     CBC & Differential; Future  -     CBC & Differential  -     azelastine (ASTELIN) 0.1 % nasal spray; 1 spray into the nostril(s) as directed by provider Daily. Use in each nostril as directed  Dispense: 30 each; Refill: 12     Constipation, unspecified constipation type     Anxiety and depression  -     buPROPion XL (WELLBUTRIN XL) 300 MG 24 hr tablet; Take 1 tablet by mouth Daily.  Dispense: 90 tablet; Refill: 1     Chronic obstructive pulmonary disease, unspecified COPD type ([facility])  -     tiotropium (Spiriva HandiHaler) 18 MCG per inhalation capsule; INHALE CONTENTS OF 1 CAPSULE BY MOUTH ONCE DAILY  Dispense: 90 capsule; Refill: 0  -     mometasone-formoterol (Dulera) 100-5 MCG/ACT inhaler; Inhale 1 puff 2 (Two) Times a Day.  Dispense: 1 each; Refill: 11  -     albuterol sulfate  (90 Base) MCG/ACT inhaler; Inhale 2 puffs Every 4 (Four) Hours As Needed for Wheezing.  Dispense: 18 g; Refill: 2     Essential hypertension  -     lisinopril (PRINIVIL,ZESTRIL) 40 MG tablet; Take 1 tablet by mouth every night at bedtime.  Dispense:  90 tablet; Refill: 1  -     Comprehensive Metabolic Panel; Future  -     POC Urinalysis Dipstick, Automated; Future  -     Comprehensive Metabolic Panel         Hyperlipidemia, unspecified hyperlipidemia type  -     simvastatin (ZOCOR) 20 MG tablet; Take 1 tablet by mouth every night at bedtime.  Dispense: 90 tablet; Refill: 1  -     polyethylene glycol (MIRALAX) 17 g packet; Take 17 g by mouth Daily.  Dispense: 90 packet; Refill: 1  -     Comprehensive Metabolic Panel; Future  -     Lipid Panel; Future  -     Comprehensive Metabolic Panel  -     Lipid Panel    Insomnia, unspecified type  -     traZODone (DESYREL) 50 MG tablet; Take 1 tablet by mouth Every Night.  Dispense: 90 tablet; Refill: 1  -     CBC & Differential; Future  -     CBC & Differential     Gastroesophageal reflux disease without esophagitis  -     Discontinue: lansoprazole (PREVACID) 30 MG capsule; Take 1 capsule by mouth Daily.  Dispense: 90 capsule; Refill: 1  -     lansoprazole (PREVACID) 30 MG capsule; Take 1 capsule by mouth Daily.  Dispense: 90 capsule; Refill: 1      Chronic low back pain without sciatica, unspecified back pain laterality  -     meloxicam (MOBIC) 7.5 MG tablet; Take 1 tablet by mouth Daily.  Dispense: 90 tablet; Refill: 1     Anxiety  -     buPROPion XL (WELLBUTRIN XL) 300 MG 24 hr tablet; Take 1 tablet by mouth Daily.  Dispense: 90 tablet; Refill: 1     Depression, unspecified depression type  -     buPROPion XL (WELLBUTRIN XL) 300 MG 24 hr tablet; Take 1 tablet by mouth Daily.  Dispense: 90 tablet; Refill: 1    Acquired hypothyroidism  -     levothyroxine (Synthroid) 100 MCG tablet; Take 1 tablet by mouth Daily.  Dispense: 90 tablet; Refill: 3  -     TSH; Future  -     TSH     Class 2 severe obesity due to excess calories with serious comorbidity and body mass index (BMI) of 38.0 to 38.9 in adult (HCC)    Screening mammogram for breast cancer  -     Mammo Screening Digital Tomosynthesis Bilateral With CAD;  Future    Epigastric pain    Other orders  -     fluticasone (Flonase) 50 MCG/ACT nasal spray; 2 sprays into the nostril(s) as directed by provider Daily.  Dispense: 16 g; Refill: 11    Right sided sciatica  -     I have referred the patient to out patient physical therapy.  -     Continue meloxicam.    Seasonal allergies  -     Continue Zyrtec 10 mg.  -     May take Flonase and Astelin nose spray as needed.    Constipation  -     Continue Miralax as needed.    Anxiety and Depression  -     Start Wellbutrin  mg daily.    COPD  -     Continue on albuterol quick inhaler as well as Dulera, and the Spiriva.    Hypertension  -    Continue lisinopril 40 mg daily.    Epigastric pain  -     Start lansoprazole.  -     Discontinue taking any anti-inflammatories such as Advil, Aleve, ibuprofen, Motrin, Naprosyn and naproxen.  -     If the symptoms persist patient will call and further work up will be needed.    Health maintenance  -     Information was given to the patient in regards to a living will and healthy diet.  -     Orders for a mammogram to be completed in 09/2022 was sent.   -     The patient was informed she can receive the shingles vaccine at her local pharmacy.    Return in 6 months      Transcribed from ambient dictation for ROMI Winters by NAZIA CLEMENT.  05/16/22   12:42 EDT    Patient verbalized consent to the visit recording.            Discussed the patient's BMI with her. BMI is above normal parameters. Recommendations include: educational material.    AWV: na  A1C:   Lab Results   Component Value Date    HGBA1C 6.20 (H) 11/24/2021      ACP: Advance Care Planning   ACP discussion was held with the patient during this visit. Patient does not have an advance directive, information provided.   Mammogram: 9/21 ordered  Colonoscopy: 2020 due 2025    Follow Up   Return in about 6 months (around 11/16/2022) for Annual, fasting.  Patient was given instructions and counseling regarding her  condition or for health maintenance advice. Please see specific information pulled into the AVS if appropriate.     RTC/call  If symptoms worsen  Meds MOA and SE's reviewed and pt v/u

## 2022-07-12 ENCOUNTER — LAB (OUTPATIENT)
Dept: INTERNAL MEDICINE | Facility: CLINIC | Age: 57
End: 2022-07-12

## 2022-07-12 DIAGNOSIS — E03.9 ACQUIRED HYPOTHYROIDISM: ICD-10-CM

## 2022-07-12 PROCEDURE — 84443 ASSAY THYROID STIM HORMONE: CPT | Performed by: NURSE PRACTITIONER

## 2022-07-12 PROCEDURE — 36415 COLL VENOUS BLD VENIPUNCTURE: CPT | Performed by: NURSE PRACTITIONER

## 2022-07-13 ENCOUNTER — TREATMENT (OUTPATIENT)
Dept: PHYSICAL THERAPY | Facility: CLINIC | Age: 57
End: 2022-07-13

## 2022-07-13 DIAGNOSIS — E03.9 ACQUIRED HYPOTHYROIDISM: ICD-10-CM

## 2022-07-13 DIAGNOSIS — M54.41 CHRONIC BILATERAL LOW BACK PAIN WITH RIGHT-SIDED SCIATICA: Primary | ICD-10-CM

## 2022-07-13 DIAGNOSIS — G89.29 CHRONIC BILATERAL LOW BACK PAIN WITH RIGHT-SIDED SCIATICA: Primary | ICD-10-CM

## 2022-07-13 LAB — TSH SERPL DL<=0.05 MIU/L-ACNC: 1.71 UIU/ML (ref 0.27–4.2)

## 2022-07-13 PROCEDURE — 97162 PT EVAL MOD COMPLEX 30 MIN: CPT | Performed by: PHYSICAL THERAPIST

## 2022-07-13 RX ORDER — LEVOTHYROXINE SODIUM 112 UG/1
112 TABLET ORAL DAILY
Qty: 90 TABLET | Refills: 3 | Status: SHIPPED | OUTPATIENT
Start: 2022-07-13

## 2022-07-13 NOTE — PROGRESS NOTES
Physical Therapy Initial Evaluation and Plan of Care      Patient: Adrianne Lowe   : 1965  Diagnosis/ICD-10 Code:  The encounter diagnosis was Chronic bilateral low back pain with right-sided sciatica.   Referring practitioner: ROMI Penny  Date of Initial Visit: Type: THERAPY  Noted: 2022  Today's Date: 2022  Patient seen for 1 sessions         Visit Diagnoses:    ICD-10-CM ICD-9-CM   1. Chronic bilateral low back pain with right-sided sciatica  M54.41 724.2    G89.29 724.3     338.29       Subjective Questionnaire: Oswestry: 42%    Subjective Evaluation    History of Present Illness  Onset date: most recent onset about 6-7 months ago.  Mechanism of injury: Pt reports back and R leg soreness in the mornings.  She reports R LBP and LE pain to knee (sometimes below the knee).  She recalls no known reason for onset of symptoms.  She is walking regularly, about 30 minutes every day.  She has had similar symptoms in the past.  She had PT for similar symptoms in spring 2021.  She improved and has since been walking regularly and has lost weight.  Symptoms returned near the beginning of .    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  Current pain ratin  At best pain ratin  At worst pain ratin  Location: R low back and LE to knee  Quality: dull ache  Relieving factors: medications and change in position (walking)  Aggravating factors: lifting (sitting, carrying)  Progression: worsening    Social Support  Lives in: one-story house  Lives with: alone    Hand dominance: right    Diagnostic Tests  No diagnostic tests performed    Treatments  Previous treatment: physical therapy and medication  Patient Goals  Patient goals for therapy: decreased pain           Treatment                Objective          Postural Observations  Seated posture: fair  Standing posture:  fair        Tenderness     Additional Tenderness Details  TTP with PA overpressure through lumbar spine.    Neurological Testing     Sensation     Lumbar   Left   Intact: light touch    Right   Intact: light touch    Reflexes   Left   Patellar (L4): normal (2+)  Achilles (S1): absent (0)    Right   Patellar (L4): normal (2+)  Achilles (S1): absent (0)    Additional Neurological Details  Pt reports intermittent R posterior thigh numbness, intermittent R flank and lower abdominal area pain    Active Range of Motion     Additional Active Range of Motion Details  FIS: increases back and R flenk/abdominal/hip/groin pain  EIS: centralizes LBP  SAVITA: no increase in pain  EIL: no pain in prone, increased R LE discomfort with EIL    Strength/Myotome Testing     Lumbar   Left   Heel walk: normal  Toe walk: normal    Right   Heel walk: normal  Toe walk: normal    Left Hip   Planes of Motion   Flexion: 4+  Extension: 3+  Abduction: 4    Right Hip   Planes of Motion   Flexion: 4  Extension: 3+  Abduction: 3+    Left Knee   Flexion: 5  Extension: 5    Right Knee   Flexion: 5  Extension: 5    Left Ankle/Foot   Dorsiflexion: 5  Plantar flexion: 5  Great toe extension: 5    Right Ankle/Foot   Dorsiflexion: 5  Plantar flexion: 5  Great toe extension: 5    Tests       Thoracic   Negative slump.     Lumbar     Left   Negative crossed SLR.     Right   Positive passive SLR.           Assessment & Plan     Assessment  Impairments: abnormal or restricted ROM, impaired physical strength, lacks appropriate home exercise program and pain with function  Functional Limitations: carrying objects, lifting, pulling, uncomfortable because of pain, sitting, standing and stooping  Assessment details: Pt has chronic recurring LBP with R sciatica.  She has neural tension in R LE.  She will benefit from PT intervention to address pain and noted deficits in strength and functional abilities.   Prognosis: good    Goals  Plan Goals: 4 weeks  1) Pt  demonstrates independence and compliance with initial HEP.  2) Pt reports reduction in pain intensity to no worse than 5/10 on NPRS.  3) Lumbar ROM shows improvement over baseline measures without increase in pain.  4) Modified Oswestry score is improved by 10% or more, indicating improving functional abilities.    8 weeks  1) Pt demonstrates independence in advanced HEP and self management strategies to prevent/reduce symptom recurrence.  2) Pain is not constant, is localized when present, and is relieved with performance of prescribed HEP.  3) Lumbar/LE mobility is sufficient for performance of usual daily routine without limitation.  4) Modified Oswestry score is improved to <30%, indicating improving functional abilities.    Plan  Therapy options: will be seen for skilled therapy services  Planned modality interventions: thermotherapy (hydrocollator packs) and cryotherapy  Planned therapy interventions: abdominal trunk stabilization, manual therapy, neuromuscular re-education, body mechanics training, postural training, flexibility, spinal/joint mobilization, strengthening, stretching, therapeutic activities, joint mobilization, home exercise program, functional ROM exercises and soft tissue mobilization  Frequency: 1x week  Duration in weeks: 8  Treatment plan discussed with: patient  Plan details: PT per POC, addressing pain and noted deficits in functional mobility.        Timed:  Manual Therapy:         mins  13555;  Therapeutic Exercise:         mins  88422;     Neuromuscular Diamond:        mins  15011;    Therapeutic Activity:          mins  50113;     Gait Training:           mins  11676;     Ultrasound:          mins  54866;    Electrical Stimulation:         mins  56058 ( );  Iontophoresis  ___ mins   57130    Untimed:  Electrical Stimulation:         mins  79025 ( );  Mechanical Traction:         mins  04334;     Timed Treatment:      mins   Total Treatment:     45   mins    PT SIGNATURE:  Ruthie Howard PT   Electronically signed  DATE TREATMENT INITIATED: 7/13/2022    Initial Certification  Certification Period: 7/13/20224367mudx13/10/2022  I certify that the therapy services are furnished while this patient is under my care.  The services outlined above are required by this patient, and will be reviewed every 90 days.    Physician Signature:_____________________________________________             PHYSICIAN: Jamee Leo APRN  NPI: [id number]                                      DATE:       Please sign and return via fax to [phone].. Thank you, [facility].

## 2022-07-14 ENCOUNTER — HOSPITAL ENCOUNTER (OUTPATIENT)
Dept: MAMMOGRAPHY | Facility: HOSPITAL | Age: 57
Discharge: HOME | End: 2022-07-14
Admitting: NURSE PRACTITIONER

## 2022-07-14 DIAGNOSIS — Z12.31 SCREENING MAMMOGRAM FOR BREAST CANCER: ICD-10-CM

## 2022-07-14 PROCEDURE — 77063 BREAST TOMOSYNTHESIS BI: CPT

## 2022-07-14 PROCEDURE — 77067 SCR MAMMO BI INCL CAD: CPT | Performed by: RADIOLOGY

## 2022-07-14 PROCEDURE — 77067 SCR MAMMO BI INCL CAD: CPT

## 2022-07-14 PROCEDURE — 77063 BREAST TOMOSYNTHESIS BI: CPT | Performed by: RADIOLOGY

## 2022-07-19 ENCOUNTER — TRANSCRIBE ORDERS (OUTPATIENT)
Dept: LAB | Facility: HOSPITAL | Age: 57
End: 2022-07-19

## 2022-07-19 ENCOUNTER — APPOINTMENT (OUTPATIENT)
Dept: OTHER | Facility: HOSPITAL | Age: 57
End: 2022-07-19

## 2022-07-19 ENCOUNTER — LAB (OUTPATIENT)
Dept: LAB | Facility: HOSPITAL | Age: 57
End: 2022-07-19

## 2022-07-19 DIAGNOSIS — H40.1230 LOW-TENSION GLAUCOMA OF BOTH EYES, UNSPECIFIED GLAUCOMA STAGE: Primary | ICD-10-CM

## 2022-07-19 DIAGNOSIS — Z92.89 H/O MAMMOGRAM: ICD-10-CM

## 2022-07-19 DIAGNOSIS — H40.1230 LOW-TENSION GLAUCOMA OF BOTH EYES, UNSPECIFIED GLAUCOMA STAGE: ICD-10-CM

## 2022-07-19 LAB
FOLATE SERPL-MCNC: 7.95 NG/ML (ref 4.78–24.2)
VIT B12 BLD-MCNC: 816 PG/ML (ref 211–946)

## 2022-07-19 PROCEDURE — 82607 VITAMIN B-12: CPT

## 2022-07-19 PROCEDURE — 82175 ASSAY OF ARSENIC: CPT

## 2022-07-19 PROCEDURE — 83655 ASSAY OF LEAD: CPT

## 2022-07-19 PROCEDURE — 36415 COLL VENOUS BLD VENIPUNCTURE: CPT

## 2022-07-19 PROCEDURE — 83825 ASSAY OF MERCURY: CPT

## 2022-07-19 PROCEDURE — 82746 ASSAY OF FOLIC ACID SERUM: CPT

## 2022-07-19 PROCEDURE — 84425 ASSAY OF VITAMIN B-1: CPT

## 2022-07-20 ENCOUNTER — TREATMENT (OUTPATIENT)
Dept: PHYSICAL THERAPY | Facility: CLINIC | Age: 57
End: 2022-07-20

## 2022-07-20 DIAGNOSIS — G89.29 CHRONIC BILATERAL LOW BACK PAIN WITH RIGHT-SIDED SCIATICA: Primary | ICD-10-CM

## 2022-07-20 DIAGNOSIS — M54.41 CHRONIC BILATERAL LOW BACK PAIN WITH RIGHT-SIDED SCIATICA: Primary | ICD-10-CM

## 2022-07-20 PROCEDURE — 97110 THERAPEUTIC EXERCISES: CPT | Performed by: PHYSICAL THERAPIST

## 2022-07-20 NOTE — PROGRESS NOTES
Physical Therapy Treatment Note  VISIT: 2          Subjective    Adrianne Lowe reports: a little soreness, but no pain.  She thinks she is walking better.      Pre-treatment pain:  0  Post-treatment pain:  0      Objective    Treatment    Exercise 1  Exercise Name 1: Total Gym squats  Exercise 2  Exercise Name 2: wall slide squats  Exercise 3  Exercise Name 3: seated forward bend  Exercise 4  Exercise Name 4: seated hamstring stretch  Exercise 5  Exercise Name 5: bridging  Exercise 6  Exercise Name 6: Lower abdominal activation  Exercise 7  Exercise Name 7: abdominal crunch  Exercise 8  Exercise Name 8: clamshells                 Assessment & Plan     Assessment    Assessment details: Pt indicates decreasing pain and improvement in walking confidence since beginning home exercises.  She tolerated progression well in clinic today.    Plan  Plan details: Continue PT.  Progress hip/core strengthening as tolerated in order to improve tolerance for activities.               Timed:  Manual Therapy:         mins  12683;  Therapeutic Exercise:    30     mins  21083;     Neuromuscular Diamond:        mins  21367;    Therapeutic Activity:          mins  86861;     Gait Training:           mins  05771;     Ultrasound:          mins  51909;    Electrical Stimulation:         mins  69183 ( );    Untimed:  Electrical Stimulation:         mins  04236 ( );  Mechanical Traction:         mins  22215;  Canalith Repositioning       mins  54590    Timed Treatment:   30   mins   Total Treatment:     30   mins      Ruthie Howard, PT  Physical Therapist

## 2022-07-22 LAB
ARSENIC BLD-MCNC: 1 UG/L (ref 0–9)
LEAD BLDV-MCNC: 1 UG/DL (ref 0–4)
MERCURY BLD-MCNC: <1 UG/L (ref 0–14.9)

## 2022-07-25 LAB — VIT B1 BLD-SCNC: 153.5 NMOL/L (ref 66.5–200)

## 2022-08-03 ENCOUNTER — TREATMENT (OUTPATIENT)
Dept: PHYSICAL THERAPY | Facility: CLINIC | Age: 57
End: 2022-08-03

## 2022-08-03 DIAGNOSIS — M54.41 CHRONIC BILATERAL LOW BACK PAIN WITH RIGHT-SIDED SCIATICA: Primary | ICD-10-CM

## 2022-08-03 DIAGNOSIS — G89.29 CHRONIC BILATERAL LOW BACK PAIN WITH RIGHT-SIDED SCIATICA: Primary | ICD-10-CM

## 2022-08-03 PROCEDURE — 97110 THERAPEUTIC EXERCISES: CPT | Performed by: PHYSICAL THERAPIST

## 2022-08-03 NOTE — PROGRESS NOTES
Physical Therapy Treatment Note  VISIT: 3          Subjective    Adrianne Lowe reports: soreness in her low back and stomach muscles.      Pre-treatment pain:  6  Post-treatment pain:  0      Objective    Treatment    Exercise 1  Exercise Name 1: Bicycle  Time: 5'  Exercise 2  Exercise Name 2: Total Gym squats  Sets/Reps 2: 2'  Exercise 3  Exercise Name 3: supine LE nerve glides  Exercise 4  Exercise Name 4: bridging  Exercise 5  Exercise Name 5: abdominal crunch  Exercise 6  Exercise Name 6: standing PPT/lower abdominal activation in standing with back to wall  Exercise 7  Exercise Name 7: [facility]                 Assessment & Plan     Assessment    Assessment details: Pt responded well to exercise progression in clinic today.  She requires cues for best technique during exercise as well as education regarding what she should or should not feel during exercise.    Plan  Plan details: Continue PT.  Progress functional strengthening as tolerated.               Timed:  Manual Therapy:         mins  82894;  Therapeutic Exercise:    40     mins  60920;     Neuromuscular Diamond:        mins  40118;    Therapeutic Activity:          mins  62456;     Gait Training:           mins  06633;     Ultrasound:          mins  34419;    Electrical Stimulation:         mins  60382 ( );    Untimed:  Electrical Stimulation:         mins  29079 ( );  Mechanical Traction:         mins  23553;  Canalith Repositioning       mins  34920    Timed Treatment:   40   mins   Total Treatment:     40   mins      Ruthie Howard, PT  Physical Therapist

## 2022-08-15 ENCOUNTER — TREATMENT (OUTPATIENT)
Dept: PHYSICAL THERAPY | Facility: CLINIC | Age: 57
End: 2022-08-15

## 2022-08-15 DIAGNOSIS — M54.41 CHRONIC BILATERAL LOW BACK PAIN WITH RIGHT-SIDED SCIATICA: Primary | ICD-10-CM

## 2022-08-15 DIAGNOSIS — G89.29 CHRONIC BILATERAL LOW BACK PAIN WITH RIGHT-SIDED SCIATICA: Primary | ICD-10-CM

## 2022-08-15 PROCEDURE — 97530 THERAPEUTIC ACTIVITIES: CPT | Performed by: PHYSICAL THERAPIST

## 2022-08-15 PROCEDURE — 97110 THERAPEUTIC EXERCISES: CPT | Performed by: PHYSICAL THERAPIST

## 2022-08-27 DIAGNOSIS — E78.5 HYPERLIPIDEMIA, UNSPECIFIED HYPERLIPIDEMIA TYPE: ICD-10-CM

## 2022-08-27 DIAGNOSIS — J44.9 CHRONIC OBSTRUCTIVE PULMONARY DISEASE, UNSPECIFIED COPD TYPE: ICD-10-CM

## 2022-08-27 DIAGNOSIS — I10 ESSENTIAL HYPERTENSION: ICD-10-CM

## 2022-08-29 ENCOUNTER — TRANSCRIBE ORDERS (OUTPATIENT)
Dept: ADMINISTRATIVE | Facility: HOSPITAL | Age: 57
End: 2022-08-29

## 2022-08-29 DIAGNOSIS — H46.9 OPTIC NEUROPATHY, BILATERAL: Primary | ICD-10-CM

## 2022-08-29 DIAGNOSIS — H53.40 VISUAL FIELD DEFECTS: Primary | ICD-10-CM

## 2022-08-29 RX ORDER — LISINOPRIL 40 MG/1
TABLET ORAL
Qty: 90 TABLET | Refills: 1 | Status: SHIPPED | OUTPATIENT
Start: 2022-08-29 | End: 2023-02-09

## 2022-08-29 RX ORDER — SIMVASTATIN 20 MG
TABLET ORAL
Qty: 30 TABLET | Refills: 2 | Status: SHIPPED | OUTPATIENT
Start: 2022-08-29 | End: 2022-11-18

## 2022-09-02 ENCOUNTER — HOSPITAL ENCOUNTER (OUTPATIENT)
Dept: CT IMAGING | Facility: HOSPITAL | Age: 57
Discharge: HOME | End: 2022-09-02
Admitting: OPHTHALMOLOGY

## 2022-09-02 DIAGNOSIS — H53.40 VISUAL FIELD DEFECTS: ICD-10-CM

## 2022-09-02 PROCEDURE — 70482 CT ORBIT/EAR/FOSSA W/O&W/DYE: CPT

## 2022-09-02 PROCEDURE — 0 IOPAMIDOL PER 1 ML: Performed by: OPHTHALMOLOGY

## 2022-09-02 RX ADMIN — IOPAMIDOL 50 ML: 755 INJECTION, SOLUTION INTRAVENOUS at 15:15

## 2022-10-22 DIAGNOSIS — G47.00 INSOMNIA, UNSPECIFIED TYPE: ICD-10-CM

## 2022-10-24 RX ORDER — TRAZODONE HYDROCHLORIDE 50 MG/1
TABLET ORAL
Qty: 90 TABLET | Refills: 1 | Status: SHIPPED | OUTPATIENT
Start: 2022-10-24

## 2022-10-24 NOTE — TELEPHONE ENCOUNTER
Rx Refill Note  Requested Prescriptions     Pending Prescriptions Disp Refills   • traZODone (DESYREL) 50 MG tablet [Pharmacy Med Name: TRAZODONE 50 MG TABLET] 90 tablet 1     Sig: TAKE 1 TABLET BY MOUTH EVERY DAY AT NIGHT      Last office visit with prescribing clinician: 5/16/2022      Next office visit with prescribing clinician: Visit date not found            Angelita Diaz MA  10/24/22, 08:38 EDT

## 2022-11-08 DIAGNOSIS — J30.2 SEASONAL ALLERGIES: ICD-10-CM

## 2022-11-08 RX ORDER — CETIRIZINE HYDROCHLORIDE 10 MG/1
TABLET ORAL
Qty: 30 TABLET | Refills: 5 | Status: SHIPPED | OUTPATIENT
Start: 2022-11-08

## 2022-11-18 DIAGNOSIS — E78.5 HYPERLIPIDEMIA, UNSPECIFIED HYPERLIPIDEMIA TYPE: ICD-10-CM

## 2022-11-18 DIAGNOSIS — J44.9 CHRONIC OBSTRUCTIVE PULMONARY DISEASE, UNSPECIFIED COPD TYPE: ICD-10-CM

## 2022-11-18 RX ORDER — SIMVASTATIN 20 MG
TABLET ORAL
Qty: 90 TABLET | Refills: 1 | Status: SHIPPED | OUTPATIENT
Start: 2022-11-18

## 2022-11-18 NOTE — TELEPHONE ENCOUNTER
LOV for chronic condition : 05/16/2022  NOV : Not scheduled    HUB please inform patient    Patient is due for an office visit.  Please advise patient she needs to schedule and keep her appointment to continue to receive refills in the future.  If she is unable to keep her appointment we will not be able to provider further refills.

## 2022-11-23 DIAGNOSIS — F41.9 ANXIETY: ICD-10-CM

## 2022-11-23 DIAGNOSIS — F32.A DEPRESSION, UNSPECIFIED DEPRESSION TYPE: ICD-10-CM

## 2022-11-23 DIAGNOSIS — F32.A ANXIETY AND DEPRESSION: ICD-10-CM

## 2022-11-23 DIAGNOSIS — F41.9 ANXIETY AND DEPRESSION: ICD-10-CM

## 2022-11-23 RX ORDER — BUPROPION HYDROCHLORIDE 300 MG/1
TABLET ORAL
Qty: 30 TABLET | Refills: 0 | Status: SHIPPED | OUTPATIENT
Start: 2022-11-23 | End: 2023-01-11

## 2022-11-23 NOTE — TELEPHONE ENCOUNTER
LOV for chronic condition 05/16/2022  NOV not scheduled     HUB please inform patient    Patient is due for a office visit.  Please advise patient her needs to schedule and keep her appointment to continue to receive refills in the future.  If she is unable to keep her appointment we will not be able to provider further refills.      Once appointment has been scheduled please update message with date and time so we can process the request.  We will forward the message to the provider to review the refill request.

## 2022-12-15 DIAGNOSIS — E78.5 HYPERLIPIDEMIA, UNSPECIFIED HYPERLIPIDEMIA TYPE: ICD-10-CM

## 2022-12-15 RX ORDER — POLYETHYLENE GLYCOL 3350 17 G/17G
POWDER, FOR SOLUTION ORAL
Qty: 30 PACKET | Refills: 5 | Status: SHIPPED | OUTPATIENT
Start: 2022-12-15

## 2023-01-11 DIAGNOSIS — F41.9 ANXIETY AND DEPRESSION: ICD-10-CM

## 2023-01-11 DIAGNOSIS — G89.29 CHRONIC LOW BACK PAIN WITHOUT SCIATICA, UNSPECIFIED BACK PAIN LATERALITY: ICD-10-CM

## 2023-01-11 DIAGNOSIS — M54.50 CHRONIC LOW BACK PAIN WITHOUT SCIATICA, UNSPECIFIED BACK PAIN LATERALITY: ICD-10-CM

## 2023-01-11 DIAGNOSIS — F41.9 ANXIETY: ICD-10-CM

## 2023-01-11 DIAGNOSIS — M54.31 RIGHT SIDED SCIATICA: ICD-10-CM

## 2023-01-11 DIAGNOSIS — K21.9 GASTROESOPHAGEAL REFLUX DISEASE WITHOUT ESOPHAGITIS: ICD-10-CM

## 2023-01-11 DIAGNOSIS — F32.A ANXIETY AND DEPRESSION: ICD-10-CM

## 2023-01-11 DIAGNOSIS — F32.A DEPRESSION, UNSPECIFIED DEPRESSION TYPE: ICD-10-CM

## 2023-01-11 RX ORDER — MELOXICAM 7.5 MG/1
TABLET ORAL
Qty: 90 TABLET | Refills: 1 | Status: SHIPPED | OUTPATIENT
Start: 2023-01-11

## 2023-01-11 RX ORDER — BUPROPION HYDROCHLORIDE 300 MG/1
TABLET ORAL
Qty: 30 TABLET | Refills: 0 | Status: SHIPPED | OUTPATIENT
Start: 2023-01-11 | End: 2023-02-09

## 2023-01-11 RX ORDER — LANSOPRAZOLE 30 MG/1
CAPSULE, DELAYED RELEASE ORAL
Qty: 90 CAPSULE | Refills: 1 | Status: SHIPPED | OUTPATIENT
Start: 2023-01-11

## 2023-02-09 DIAGNOSIS — I10 ESSENTIAL HYPERTENSION: ICD-10-CM

## 2023-02-09 DIAGNOSIS — F32.A DEPRESSION, UNSPECIFIED DEPRESSION TYPE: ICD-10-CM

## 2023-02-09 DIAGNOSIS — F41.9 ANXIETY: ICD-10-CM

## 2023-02-09 DIAGNOSIS — J44.9 CHRONIC OBSTRUCTIVE PULMONARY DISEASE, UNSPECIFIED COPD TYPE: ICD-10-CM

## 2023-02-09 DIAGNOSIS — F32.A ANXIETY AND DEPRESSION: ICD-10-CM

## 2023-02-09 DIAGNOSIS — F41.9 ANXIETY AND DEPRESSION: ICD-10-CM

## 2023-02-09 RX ORDER — LISINOPRIL 40 MG/1
TABLET ORAL
Qty: 90 TABLET | Refills: 1 | Status: SHIPPED | OUTPATIENT
Start: 2023-02-09

## 2023-02-09 RX ORDER — BUPROPION HYDROCHLORIDE 300 MG/1
TABLET ORAL
Qty: 30 TABLET | Refills: 0 | Status: SHIPPED | OUTPATIENT
Start: 2023-02-09

## 2023-05-15 DIAGNOSIS — G47.00 INSOMNIA, UNSPECIFIED TYPE: ICD-10-CM

## 2023-05-15 RX ORDER — TRAZODONE HYDROCHLORIDE 50 MG/1
TABLET ORAL
Qty: 90 TABLET | Refills: 1 | Status: SHIPPED | OUTPATIENT
Start: 2023-05-15

## 2023-05-24 DIAGNOSIS — J30.2 SEASONAL ALLERGIES: ICD-10-CM

## 2023-05-24 RX ORDER — FLUTICASONE PROPIONATE 50 MCG
SPRAY, SUSPENSION (ML) NASAL
Qty: 16 ML | Refills: 11 | Status: SHIPPED | OUTPATIENT
Start: 2023-05-24

## 2023-05-24 RX ORDER — CETIRIZINE HYDROCHLORIDE 10 MG/1
TABLET ORAL
Qty: 30 TABLET | Refills: 5 | Status: SHIPPED | OUTPATIENT
Start: 2023-05-24

## 2023-05-28 DIAGNOSIS — E78.5 HYPERLIPIDEMIA, UNSPECIFIED HYPERLIPIDEMIA TYPE: ICD-10-CM

## 2023-05-30 RX ORDER — SIMVASTATIN 20 MG
TABLET ORAL
Qty: 90 TABLET | Refills: 1 | OUTPATIENT
Start: 2023-05-30

## 2023-05-31 NOTE — TELEPHONE ENCOUNTER
Not seen in greater than 1 year needs appointment for further refills.  Please call to schedule fasting physical.

## 2023-06-02 NOTE — TELEPHONE ENCOUNTER
"Called patient left voice mail to schedule an appointment as requested by PCP:  \" Not seen in greater than 1 year needs appointment for further refills.  Please call to schedule fasting physical.\"    HUB OK to schedule.    "

## 2023-06-12 ENCOUNTER — TELEPHONE (OUTPATIENT)
Dept: INTERNAL MEDICINE | Facility: CLINIC | Age: 58
End: 2023-06-12
Payer: COMMERCIAL

## 2023-06-12 NOTE — TELEPHONE ENCOUNTER
Caller: Adrianne Lowe    Relationship to patient: Self    Best call back number: [phone]    THE HUB READ THE FOLLOWING MESSAGE TO THE PATIENT      2nd attempt to reach patient no answer left voicemail to return call.     HUB OK TO READ: Not seen in greater than 1 year needs appointment for further refills.  Please call to schedule fasting physical       PATIENT WILL BE MOVING AND WILL BE SCHEDULING WITH ANOTHER PROVIDER

## 2023-07-29 DIAGNOSIS — E78.5 HYPERLIPIDEMIA, UNSPECIFIED HYPERLIPIDEMIA TYPE: ICD-10-CM

## 2023-07-31 RX ORDER — SIMVASTATIN 20 MG
TABLET ORAL
Qty: 30 TABLET | Refills: 2 | OUTPATIENT
Start: 2023-07-31

## 2023-09-27 DIAGNOSIS — I10 ESSENTIAL HYPERTENSION: ICD-10-CM

## 2023-09-27 DIAGNOSIS — K21.9 GASTROESOPHAGEAL REFLUX DISEASE WITHOUT ESOPHAGITIS: ICD-10-CM

## 2023-09-27 RX ORDER — LISINOPRIL 40 MG/1
TABLET ORAL
Qty: 30 TABLET | Refills: 0 | Status: SHIPPED | OUTPATIENT
Start: 2023-09-27

## 2023-09-27 RX ORDER — LANSOPRAZOLE 30 MG/1
CAPSULE, DELAYED RELEASE ORAL
Qty: 30 CAPSULE | Refills: 0 | Status: SHIPPED | OUTPATIENT
Start: 2023-09-27

## 2023-09-29 NOTE — TELEPHONE ENCOUNTER
2nd attempt     HUB OK TO READ:    30-day refill sent in for the patient as she has not been seen since 5/22.  Please schedule fasting physical exam.     
30-day refill sent in for the patient as she has not been seen since 5/22.  Please schedule fasting physical exam.  
3rd attempt    Patient's phone is not accepting calls at this time, no voicemail. No emergency contact or additional phone numbers to try on file. Pharmacy does not have a different phone number either     HUB OK TO READ:    30-day refill sent in for the patient as she has not been seen since 5/22.  Please schedule fasting physical exam.     
LOV for chronic condition   NOV    
LOV for chronic condition 05/16/22  NOV    
Tried to reach patient no answer and no voicemail     HUB OK TO READ:    30-day refill sent in for the patient as she has not been seen since 5/22.  Please schedule fasting physical exam.     
Oriented - self; Oriented - place; Oriented - time

## 2023-10-16 NOTE — TELEPHONE ENCOUNTER
Patient was given providers message and verb good understanding. Patient denied scheduling an appointment at this time.    Spironolactone Counseling: Patient advised regarding risks of diarrhea, abdominal pain, hyperkalemia, birth defects (for female patients), liver toxicity and renal toxicity. The patient may need blood work to monitor liver and kidney function and potassium levels while on therapy. The patient verbalized understanding of the proper use and possible adverse effects of spironolactone.  All of the patient's questions and concerns were addressed.